# Patient Record
Sex: FEMALE | Race: WHITE | NOT HISPANIC OR LATINO | Employment: PART TIME | URBAN - METROPOLITAN AREA
[De-identification: names, ages, dates, MRNs, and addresses within clinical notes are randomized per-mention and may not be internally consistent; named-entity substitution may affect disease eponyms.]

---

## 2017-02-15 ENCOUNTER — OFFICE VISIT - RIVER FALLS (OUTPATIENT)
Dept: FAMILY MEDICINE | Facility: CLINIC | Age: 47
End: 2017-02-15

## 2017-02-15 ASSESSMENT — MIFFLIN-ST. JEOR: SCORE: 1185.16

## 2017-03-13 ENCOUNTER — OFFICE VISIT - RIVER FALLS (OUTPATIENT)
Dept: FAMILY MEDICINE | Facility: CLINIC | Age: 47
End: 2017-03-13

## 2017-03-13 ASSESSMENT — MIFFLIN-ST. JEOR: SCORE: 1168.14

## 2017-06-22 ENCOUNTER — OFFICE VISIT - RIVER FALLS (OUTPATIENT)
Dept: FAMILY MEDICINE | Facility: CLINIC | Age: 47
End: 2017-06-22

## 2017-06-22 ASSESSMENT — MIFFLIN-ST. JEOR: SCORE: 1189.01

## 2017-06-23 LAB
CREAT SERPL-MCNC: 0.76 MG/DL (ref 0.5–1.1)
GLUCOSE BLD-MCNC: 87 MG/DL (ref 65–99)

## 2017-08-24 ENCOUNTER — OFFICE VISIT - RIVER FALLS (OUTPATIENT)
Dept: FAMILY MEDICINE | Facility: CLINIC | Age: 47
End: 2017-08-24

## 2017-08-24 ASSESSMENT — MIFFLIN-ST. JEOR: SCORE: 1211.69

## 2017-09-14 ENCOUNTER — AMBULATORY - RIVER FALLS (OUTPATIENT)
Dept: FAMILY MEDICINE | Facility: CLINIC | Age: 47
End: 2017-09-14

## 2018-04-04 ENCOUNTER — OFFICE VISIT - RIVER FALLS (OUTPATIENT)
Dept: FAMILY MEDICINE | Facility: CLINIC | Age: 48
End: 2018-04-04

## 2018-04-04 ASSESSMENT — MIFFLIN-ST. JEOR: SCORE: 1206.25

## 2018-09-06 ENCOUNTER — OFFICE VISIT - RIVER FALLS (OUTPATIENT)
Dept: FAMILY MEDICINE | Facility: CLINIC | Age: 48
End: 2018-09-06

## 2018-09-06 ASSESSMENT — MIFFLIN-ST. JEOR: SCORE: 1241.63

## 2019-03-15 ENCOUNTER — OFFICE VISIT - RIVER FALLS (OUTPATIENT)
Dept: FAMILY MEDICINE | Facility: CLINIC | Age: 49
End: 2019-03-15

## 2019-03-15 LAB
ALBUMIN UR-MCNC: NEGATIVE G/DL
APPEARANCE UR: CLEAR
BILIRUB UR QL STRIP: NEGATIVE
COLOR UR AUTO: YELLOW
ERYTHROCYTE [DISTWIDTH] IN BLOOD BY AUTOMATED COUNT: 13.4 %
GLUCOSE UR STRIP-MCNC: NEGATIVE MG/DL
HCT VFR BLD AUTO: 41.3 %
HGB BLD-MCNC: 12.9 G/DL
HGB UR QL STRIP: NEGATIVE
KETONES UR STRIP-MCNC: NEGATIVE MG/DL
LEUKOCYTE ESTERASE UR QL STRIP: NEGATIVE
LYMPHOCYTES NFR BLD AUTO: 14.4 %
MCH RBC QN AUTO: 27.5 PG
MCHC RBC AUTO-ENTMCNC: 31.2 GM/DL
MCV RBC AUTO: 88.2 FL
MONOCYTES NFR BLD AUTO: 6.3 %
NEUTROPHILS NFR BLD AUTO: 79.3 %
NITRATE UR QL: NEGATIVE
PH UR STRIP: 8.5 [PH]
PLATELET # BLD AUTO: 261 X10^3/UL
RBC # BLD AUTO: 4.68 X10^6/UL
SP GR UR STRIP: 1.02
UROBILINOGEN UR STRIP-MCNC: NORMAL MG/DL
WBC # BLD AUTO: 9.1 X10^3/UL

## 2019-03-19 ENCOUNTER — OFFICE VISIT - RIVER FALLS (OUTPATIENT)
Dept: FAMILY MEDICINE | Facility: CLINIC | Age: 49
End: 2019-03-19

## 2019-03-19 ASSESSMENT — MIFFLIN-ST. JEOR: SCORE: 1243.44

## 2019-07-15 ENCOUNTER — OFFICE VISIT - RIVER FALLS (OUTPATIENT)
Dept: FAMILY MEDICINE | Facility: CLINIC | Age: 49
End: 2019-07-15

## 2019-07-15 ASSESSMENT — MIFFLIN-ST. JEOR: SCORE: 1244.35

## 2019-07-16 LAB — TSH SERPL DL<=0.005 MIU/L-ACNC: 0.16 MIU/L

## 2019-07-17 ENCOUNTER — COMMUNICATION - RIVER FALLS (OUTPATIENT)
Dept: FAMILY MEDICINE | Facility: CLINIC | Age: 49
End: 2019-07-17

## 2019-10-01 ENCOUNTER — OFFICE VISIT - RIVER FALLS (OUTPATIENT)
Dept: FAMILY MEDICINE | Facility: CLINIC | Age: 49
End: 2019-10-01

## 2019-10-01 ASSESSMENT — MIFFLIN-ST. JEOR: SCORE: 1252.51

## 2020-02-13 ENCOUNTER — OFFICE VISIT - RIVER FALLS (OUTPATIENT)
Dept: FAMILY MEDICINE | Facility: CLINIC | Age: 50
End: 2020-02-13

## 2020-02-13 ASSESSMENT — MIFFLIN-ST. JEOR: SCORE: 1247.98

## 2020-04-20 ENCOUNTER — OFFICE VISIT - RIVER FALLS (OUTPATIENT)
Dept: FAMILY MEDICINE | Facility: CLINIC | Age: 50
End: 2020-04-20

## 2020-05-29 ENCOUNTER — OFFICE VISIT - RIVER FALLS (OUTPATIENT)
Dept: FAMILY MEDICINE | Facility: CLINIC | Age: 50
End: 2020-05-29

## 2020-05-29 ASSESSMENT — MIFFLIN-ST. JEOR: SCORE: 1279.73

## 2020-06-01 ENCOUNTER — OFFICE VISIT - RIVER FALLS (OUTPATIENT)
Dept: FAMILY MEDICINE | Facility: CLINIC | Age: 50
End: 2020-06-01

## 2020-07-13 ENCOUNTER — OFFICE VISIT - RIVER FALLS (OUTPATIENT)
Dept: FAMILY MEDICINE | Facility: CLINIC | Age: 50
End: 2020-07-13

## 2020-07-13 ASSESSMENT — MIFFLIN-ST. JEOR: SCORE: 1225.3

## 2020-07-14 ENCOUNTER — COMMUNICATION - RIVER FALLS (OUTPATIENT)
Dept: FAMILY MEDICINE | Facility: CLINIC | Age: 50
End: 2020-07-14

## 2020-07-14 LAB
CHOLEST SERPL-MCNC: 239 MG/DL
CHOLEST/HDLC SERPL: 3 {RATIO}
GLUCOSE BLD-MCNC: 91 MG/DL (ref 65–99)
HDLC SERPL-MCNC: 81 MG/DL
LDLC SERPL CALC-MCNC: 140 MG/DL
NONHDLC SERPL-MCNC: 158 MG/DL
TRIGL SERPL-MCNC: 83 MG/DL
TSH SERPL DL<=0.005 MIU/L-ACNC: 3.93 MIU/L

## 2020-07-17 ENCOUNTER — COMMUNICATION - RIVER FALLS (OUTPATIENT)
Dept: FAMILY MEDICINE | Facility: CLINIC | Age: 50
End: 2020-07-17

## 2020-08-25 ENCOUNTER — OFFICE VISIT - RIVER FALLS (OUTPATIENT)
Dept: FAMILY MEDICINE | Facility: CLINIC | Age: 50
End: 2020-08-25

## 2020-08-25 ASSESSMENT — MIFFLIN-ST. JEOR: SCORE: 1252.51

## 2020-09-28 ENCOUNTER — COMMUNICATION - RIVER FALLS (OUTPATIENT)
Dept: FAMILY MEDICINE | Facility: CLINIC | Age: 50
End: 2020-09-28

## 2020-09-28 ENCOUNTER — OFFICE VISIT - RIVER FALLS (OUTPATIENT)
Dept: FAMILY MEDICINE | Facility: CLINIC | Age: 50
End: 2020-09-28

## 2020-09-28 LAB
ALBUMIN UR-MCNC: NEGATIVE G/DL
BASOPHILS # BLD MANUAL: 0 10*3/UL
BASOPHILS NFR BLD MANUAL: 0.3 %
BILIRUB UR QL STRIP: NEGATIVE
EOSINOPHIL # BLD MANUAL: 0.1 10*3/UL
EOSINOPHIL NFR BLD MANUAL: 2.2 %
ERYTHROCYTE [DISTWIDTH] IN BLOOD BY AUTOMATED COUNT: 14.1 % (ref 11.5–15.5)
GLUCOSE UR STRIP-MCNC: NEGATIVE MG/DL
HCT VFR BLD AUTO: 41.7 % (ref 33–51)
HGB BLD-MCNC: 13.9 G/DL (ref 12–16)
HGB UR QL STRIP: NEGATIVE
KETONES UR STRIP-MCNC: NEGATIVE MG/DL
LEUKOCYTE ESTERASE UR QL STRIP: NEGATIVE
LYMPHOCYTES # BLD MANUAL: 1.7 10*3/UL (ref 0.9–2.9)
LYMPHOCYTES NFR BLD MANUAL: 27.7 %
MCH RBC QN AUTO: 28.8 PG (ref 26–34)
MCHC RBC AUTO-ENTMCNC: 33.3 G/DL (ref 32–36)
MCV RBC AUTO: 86 FL (ref 80–100)
MONOCYTES # BLD MANUAL: 0.6 10*3/UL
MONOCYTES NFR BLD MANUAL: 9.6 %
NEUTROPHILS # BLD MANUAL: 3.6 10*3/UL (ref 1.7–7)
NEUTROPHILS NFR BLD MANUAL: 60.2 %
NITRATE UR QL: NEGATIVE
PH UR STRIP: 7 [PH] (ref 5–8)
PLATELET # BLD AUTO: 264 10*3/UL (ref 140–440)
PMV BLD: 9.3 FL (ref 6.5–11)
RBC # BLD AUTO: 4.83 10*6/UL (ref 4–5.2)
SP GR UR STRIP: 1.02 (ref 1–1.03)
WBC # BLD AUTO: 6 10*3/UL (ref 4.5–11)

## 2020-09-29 LAB
T4 FREE SERPL-MCNC: 1.4 NG/DL (ref 0.8–1.8)
TSH SERPL DL<=0.005 MIU/L-ACNC: 0.99 MIU/L

## 2020-09-30 LAB — BACTERIA SPEC CULT: NORMAL

## 2020-10-07 ENCOUNTER — OFFICE VISIT - RIVER FALLS (OUTPATIENT)
Dept: FAMILY MEDICINE | Facility: CLINIC | Age: 50
End: 2020-10-07

## 2020-10-07 ASSESSMENT — MIFFLIN-ST. JEOR: SCORE: 1257.05

## 2020-12-29 ENCOUNTER — OFFICE VISIT - RIVER FALLS (OUTPATIENT)
Dept: FAMILY MEDICINE | Facility: CLINIC | Age: 50
End: 2020-12-29

## 2020-12-29 ASSESSMENT — MIFFLIN-ST. JEOR: SCORE: 1234.37

## 2021-01-21 ENCOUNTER — OFFICE VISIT - RIVER FALLS (OUTPATIENT)
Dept: FAMILY MEDICINE | Facility: CLINIC | Age: 51
End: 2021-01-21

## 2021-01-21 LAB
A/G RATIO - HISTORICAL: 1.5 (ref 1–2)
ALBUMIN SERPL-MCNC: 4.6 G/DL (ref 3.5–5.2)
ALP SERPL-CCNC: 117 IU/L (ref 50–136)
ALT SERPL W P-5'-P-CCNC: 48 IU/L (ref 8–45)
AMYLASE SERPL-CCNC: 90 IU/L (ref 25–125)
ANION GAP SERPL CALCULATED.3IONS-SCNC: 7 MMOL/L (ref 5–18)
AST SERPL W P-5'-P-CCNC: 29 IU/L (ref 2–40)
BASOPHILS # BLD MANUAL: 0 10*3/UL
BASOPHILS NFR BLD MANUAL: 0.4 %
BILIRUB DIRECT SERPL-MCNC: 0.2 MG/DL (ref 0.1–0.5)
BILIRUB INDIRECT SERPL-MCNC: 0.3 MG/DL (ref 0.2–0.8)
BILIRUB SERPL-MCNC: 0.5 MG/DL (ref 0.2–1.2)
BUN SERPL-MCNC: 19 MG/DL (ref 8–25)
BUN/CREAT RATIO - HISTORICAL: 24 (ref 10–20)
CALCIUM SERPL-MCNC: 9.4 MG/DL (ref 8.5–10.5)
CHLORIDE BLD-SCNC: 105 MMOL/L (ref 98–110)
CO2 SERPL-SCNC: 28 MMOL/L (ref 21–31)
CREAT SERPL-MCNC: 0.8 MG/DL (ref 0.57–1.11)
EOSINOPHIL # BLD MANUAL: 0.1 10*3/UL
EOSINOPHIL NFR BLD MANUAL: 1.6 %
ERYTHROCYTE [DISTWIDTH] IN BLOOD BY AUTOMATED COUNT: 13.8 % (ref 11.5–15.5)
GFR ESTIMATE EXT - HISTORICAL: >60
GLOBULIN: 3.1 G/DL (ref 2–3.7)
GLUCOSE BLD-MCNC: 94 MG/DL (ref 65–100)
HCT VFR BLD AUTO: 43 % (ref 33–51)
HGB BLD-MCNC: 14.4 G/DL (ref 12–16)
LYMPHOCYTES # BLD MANUAL: 1.4 10*3/UL (ref 0.9–2.9)
LYMPHOCYTES NFR BLD MANUAL: 26.1 %
MCH RBC QN AUTO: 28.7 PG (ref 26–34)
MCHC RBC AUTO-ENTMCNC: 33.5 G/DL (ref 32–36)
MCV RBC AUTO: 86 FL (ref 80–100)
MONOCYTES # BLD MANUAL: 0.5 10*3/UL
MONOCYTES NFR BLD MANUAL: 8.4 %
NEUTROPHILS # BLD MANUAL: 3.5 10*3/UL (ref 1.7–7)
NEUTROPHILS NFR BLD MANUAL: 63.5 %
PLATELET # BLD AUTO: 236 10*3/UL (ref 140–440)
PMV BLD: 9.4 FL (ref 6.5–11)
POTASSIUM BLD-SCNC: 4.7 MMOL/L (ref 3.5–5)
PROT SERPL-MCNC: 7.7 G/DL (ref 6–8)
RBC # BLD AUTO: 5.01 10*6/UL (ref 4–5.2)
SODIUM SERPL-SCNC: 140 MMOL/L (ref 135–145)
WBC # BLD AUTO: 5.5 10*3/UL (ref 4.5–11)

## 2021-01-21 ASSESSMENT — MIFFLIN-ST. JEOR: SCORE: 1225.3

## 2021-02-03 ENCOUNTER — OFFICE VISIT - RIVER FALLS (OUTPATIENT)
Dept: FAMILY MEDICINE | Facility: CLINIC | Age: 51
End: 2021-02-03

## 2021-02-03 ASSESSMENT — MIFFLIN-ST. JEOR: SCORE: 1216.23

## 2021-03-02 ENCOUNTER — OFFICE VISIT - RIVER FALLS (OUTPATIENT)
Dept: FAMILY MEDICINE | Facility: CLINIC | Age: 51
End: 2021-03-02

## 2021-03-02 ASSESSMENT — MIFFLIN-ST. JEOR: SCORE: 1229.38

## 2021-06-23 ENCOUNTER — OFFICE VISIT - RIVER FALLS (OUTPATIENT)
Dept: FAMILY MEDICINE | Facility: CLINIC | Age: 51
End: 2021-06-23

## 2021-06-23 ASSESSMENT — MIFFLIN-ST. JEOR: SCORE: 1211.69

## 2021-06-25 ENCOUNTER — COMMUNICATION - RIVER FALLS (OUTPATIENT)
Dept: FAMILY MEDICINE | Facility: CLINIC | Age: 51
End: 2021-06-25

## 2021-06-25 LAB
ALBUMIN UR-MCNC: NEGATIVE G/DL
APPEARANCE UR: CLEAR
BACTERIA SPEC CULT: NORMAL
BILIRUB UR QL STRIP: NEGATIVE
COLOR UR AUTO: YELLOW
GLUCOSE UR STRIP-MCNC: NEGATIVE MG/DL
HGB UR QL STRIP: NEGATIVE
KETONES UR STRIP-MCNC: NEGATIVE MG/DL
LEUKOCYTE ESTERASE UR QL STRIP: NEGATIVE
NITRATE UR QL: NEGATIVE
PH UR STRIP: 5.5 [PH] (ref 5–8)
SP GR UR STRIP: 1.02 (ref 1–1.03)

## 2021-07-13 ENCOUNTER — OFFICE VISIT - RIVER FALLS (OUTPATIENT)
Dept: FAMILY MEDICINE | Facility: CLINIC | Age: 51
End: 2021-07-13

## 2021-07-13 ASSESSMENT — MIFFLIN-ST. JEOR: SCORE: 1218.49

## 2021-08-12 ENCOUNTER — OFFICE VISIT - RIVER FALLS (OUTPATIENT)
Dept: FAMILY MEDICINE | Facility: CLINIC | Age: 51
End: 2021-08-12

## 2021-08-12 ASSESSMENT — MIFFLIN-ST. JEOR: SCORE: 1218.49

## 2021-09-09 ENCOUNTER — OFFICE VISIT - RIVER FALLS (OUTPATIENT)
Dept: FAMILY MEDICINE | Facility: CLINIC | Age: 51
End: 2021-09-09
Payer: COMMERCIAL

## 2021-09-29 ENCOUNTER — LAB REQUISITION (OUTPATIENT)
Dept: LAB | Facility: CLINIC | Age: 51
End: 2021-09-29
Payer: COMMERCIAL

## 2021-09-29 ENCOUNTER — AMBULATORY - RIVER FALLS (OUTPATIENT)
Dept: FAMILY MEDICINE | Facility: CLINIC | Age: 51
End: 2021-09-29
Payer: COMMERCIAL

## 2021-09-29 ENCOUNTER — OFFICE VISIT - RIVER FALLS (OUTPATIENT)
Dept: FAMILY MEDICINE | Facility: CLINIC | Age: 51
End: 2021-09-29
Payer: COMMERCIAL

## 2021-09-29 DIAGNOSIS — U07.1 COVID-19: ICD-10-CM

## 2021-09-29 PROCEDURE — U0003 INFECTIOUS AGENT DETECTION BY NUCLEIC ACID (DNA OR RNA); SEVERE ACUTE RESPIRATORY SYNDROME CORONAVIRUS 2 (SARS-COV-2) (CORONAVIRUS DISEASE [COVID-19]), AMPLIFIED PROBE TECHNIQUE, MAKING USE OF HIGH THROUGHPUT TECHNOLOGIES AS DESCRIBED BY CMS-2020-01-R: HCPCS | Mod: ORL | Performed by: PHYSICIAN ASSISTANT

## 2021-10-01 LAB — SARS-COV-2 RNA RESP QL NAA+PROBE: DETECTED

## 2021-10-05 LAB — SARS-COV-2 RNA RESP QL NAA+PROBE: POSITIVE

## 2021-10-11 ENCOUNTER — OFFICE VISIT - RIVER FALLS (OUTPATIENT)
Dept: FAMILY MEDICINE | Facility: CLINIC | Age: 51
End: 2021-10-11
Payer: COMMERCIAL

## 2021-10-11 ASSESSMENT — MIFFLIN-ST. JEOR: SCORE: 1213.96

## 2021-10-21 ENCOUNTER — COMMUNICATION - RIVER FALLS (OUTPATIENT)
Dept: FAMILY MEDICINE | Facility: CLINIC | Age: 51
End: 2021-10-21
Payer: COMMERCIAL

## 2021-10-27 ENCOUNTER — OFFICE VISIT - RIVER FALLS (OUTPATIENT)
Dept: FAMILY MEDICINE | Facility: CLINIC | Age: 51
End: 2021-10-27
Payer: COMMERCIAL

## 2021-10-27 ASSESSMENT — MIFFLIN-ST. JEOR: SCORE: 1219.4

## 2021-10-29 ENCOUNTER — COMMUNICATION - RIVER FALLS (OUTPATIENT)
Dept: FAMILY MEDICINE | Facility: CLINIC | Age: 51
End: 2021-10-29
Payer: COMMERCIAL

## 2021-11-01 ENCOUNTER — OFFICE VISIT - RIVER FALLS (OUTPATIENT)
Dept: FAMILY MEDICINE | Facility: CLINIC | Age: 51
End: 2021-11-01
Payer: COMMERCIAL

## 2021-11-01 ASSESSMENT — MIFFLIN-ST. JEOR: SCORE: 1215.32

## 2021-11-02 LAB — D DIMER PPP FEU-MCNC: 0.27 MCG/ML FEU

## 2021-11-04 ENCOUNTER — COMMUNICATION - RIVER FALLS (OUTPATIENT)
Dept: FAMILY MEDICINE | Facility: CLINIC | Age: 51
End: 2021-11-04
Payer: COMMERCIAL

## 2021-11-16 ENCOUNTER — OFFICE VISIT - RIVER FALLS (OUTPATIENT)
Dept: FAMILY MEDICINE | Facility: CLINIC | Age: 51
End: 2021-11-16
Payer: COMMERCIAL

## 2021-11-19 ENCOUNTER — OFFICE VISIT - RIVER FALLS (OUTPATIENT)
Dept: FAMILY MEDICINE | Facility: CLINIC | Age: 51
End: 2021-11-19
Payer: COMMERCIAL

## 2021-11-19 ASSESSMENT — MIFFLIN-ST. JEOR: SCORE: 1211.69

## 2021-11-23 ENCOUNTER — OFFICE VISIT - RIVER FALLS (OUTPATIENT)
Dept: FAMILY MEDICINE | Facility: CLINIC | Age: 51
End: 2021-11-23
Payer: COMMERCIAL

## 2021-11-23 ASSESSMENT — MIFFLIN-ST. JEOR: SCORE: 1229.83

## 2021-11-24 ENCOUNTER — OFFICE VISIT - RIVER FALLS (OUTPATIENT)
Dept: FAMILY MEDICINE | Facility: CLINIC | Age: 51
End: 2021-11-24
Payer: COMMERCIAL

## 2021-11-24 ASSESSMENT — MIFFLIN-ST. JEOR: SCORE: 1225.3

## 2021-12-01 ENCOUNTER — COMMUNICATION - RIVER FALLS (OUTPATIENT)
Dept: FAMILY MEDICINE | Facility: CLINIC | Age: 51
End: 2021-12-01
Payer: COMMERCIAL

## 2021-12-06 ENCOUNTER — OFFICE VISIT - RIVER FALLS (OUTPATIENT)
Dept: FAMILY MEDICINE | Facility: CLINIC | Age: 51
End: 2021-12-06
Payer: COMMERCIAL

## 2021-12-06 ENCOUNTER — COMMUNICATION - RIVER FALLS (OUTPATIENT)
Dept: FAMILY MEDICINE | Facility: CLINIC | Age: 51
End: 2021-12-06
Payer: COMMERCIAL

## 2021-12-06 ASSESSMENT — MIFFLIN-ST. JEOR: SCORE: 1225.3

## 2021-12-17 ENCOUNTER — OFFICE VISIT - RIVER FALLS (OUTPATIENT)
Dept: FAMILY MEDICINE | Facility: CLINIC | Age: 51
End: 2021-12-17
Payer: COMMERCIAL

## 2021-12-17 ASSESSMENT — MIFFLIN-ST. JEOR: SCORE: 1234.37

## 2022-01-18 ENCOUNTER — OFFICE VISIT - RIVER FALLS (OUTPATIENT)
Dept: FAMILY MEDICINE | Facility: CLINIC | Age: 52
End: 2022-01-18
Payer: COMMERCIAL

## 2022-02-11 VITALS
BODY MASS INDEX: 23.28 KG/M2 | DIASTOLIC BLOOD PRESSURE: 70 MMHG | HEIGHT: 63 IN | TEMPERATURE: 99.1 F | HEART RATE: 84 BPM | SYSTOLIC BLOOD PRESSURE: 98 MMHG | WEIGHT: 131.4 LBS

## 2022-02-11 VITALS
SYSTOLIC BLOOD PRESSURE: 110 MMHG | SYSTOLIC BLOOD PRESSURE: 104 MMHG | OXYGEN SATURATION: 96 % | TEMPERATURE: 99.5 F | WEIGHT: 144.6 LBS | BODY MASS INDEX: 25.87 KG/M2 | DIASTOLIC BLOOD PRESSURE: 70 MMHG | HEIGHT: 63 IN | HEART RATE: 70 BPM | BODY MASS INDEX: 24.82 KG/M2 | HEART RATE: 110 BPM | WEIGHT: 146 LBS | DIASTOLIC BLOOD PRESSURE: 60 MMHG

## 2022-02-11 VITALS — HEIGHT: 63 IN | BODY MASS INDEX: 26.28 KG/M2

## 2022-02-12 VITALS
WEIGHT: 149 LBS | WEIGHT: 148 LBS | HEART RATE: 72 BPM | DIASTOLIC BLOOD PRESSURE: 74 MMHG | SYSTOLIC BLOOD PRESSURE: 120 MMHG | SYSTOLIC BLOOD PRESSURE: 114 MMHG | BODY MASS INDEX: 26.64 KG/M2 | OXYGEN SATURATION: 98 % | RESPIRATION RATE: 16 BRPM | HEART RATE: 88 BPM | HEIGHT: 63 IN | WEIGHT: 148 LBS | BODY MASS INDEX: 26.22 KG/M2 | HEIGHT: 63 IN | SYSTOLIC BLOOD PRESSURE: 108 MMHG | TEMPERATURE: 98.2 F | TEMPERATURE: 97.9 F | DIASTOLIC BLOOD PRESSURE: 68 MMHG | HEART RATE: 92 BPM | BODY MASS INDEX: 26.4 KG/M2 | TEMPERATURE: 97.3 F | DIASTOLIC BLOOD PRESSURE: 72 MMHG

## 2022-02-12 VITALS
TEMPERATURE: 98.2 F | SYSTOLIC BLOOD PRESSURE: 112 MMHG | WEIGHT: 139 LBS | SYSTOLIC BLOOD PRESSURE: 114 MMHG | DIASTOLIC BLOOD PRESSURE: 68 MMHG | BODY MASS INDEX: 23.74 KG/M2 | WEIGHT: 134 LBS | HEIGHT: 63 IN | HEART RATE: 96 BPM | DIASTOLIC BLOOD PRESSURE: 66 MMHG | TEMPERATURE: 98.2 F | HEART RATE: 84 BPM | BODY MASS INDEX: 24.63 KG/M2 | OXYGEN SATURATION: 99 % | HEIGHT: 63 IN

## 2022-02-12 VITALS
HEART RATE: 110 BPM | OXYGEN SATURATION: 98 % | SYSTOLIC BLOOD PRESSURE: 124 MMHG | WEIGHT: 139 LBS | BODY MASS INDEX: 25.52 KG/M2 | TEMPERATURE: 97.9 F | TEMPERATURE: 97 F | HEIGHT: 63 IN | OXYGEN SATURATION: 99 % | HEART RATE: 100 BPM | TEMPERATURE: 97.6 F | DIASTOLIC BLOOD PRESSURE: 64 MMHG | BODY MASS INDEX: 25.2 KG/M2 | TEMPERATURE: 97.5 F | SYSTOLIC BLOOD PRESSURE: 114 MMHG | BODY MASS INDEX: 24.63 KG/M2 | DIASTOLIC BLOOD PRESSURE: 72 MMHG | WEIGHT: 144 LBS | WEIGHT: 142 LBS | TEMPERATURE: 97.8 F | HEIGHT: 63 IN | TEMPERATURE: 98.5 F | DIASTOLIC BLOOD PRESSURE: 76 MMHG | HEART RATE: 92 BPM | OXYGEN SATURATION: 99 % | HEART RATE: 76 BPM | HEIGHT: 63 IN | HEART RATE: 86 BPM | OXYGEN SATURATION: 99 % | DIASTOLIC BLOOD PRESSURE: 76 MMHG | WEIGHT: 143 LBS | BODY MASS INDEX: 25.16 KG/M2 | HEIGHT: 63 IN | DIASTOLIC BLOOD PRESSURE: 86 MMHG | SYSTOLIC BLOOD PRESSURE: 124 MMHG | OXYGEN SATURATION: 94 % | SYSTOLIC BLOOD PRESSURE: 114 MMHG | DIASTOLIC BLOOD PRESSURE: 80 MMHG | HEIGHT: 63 IN | HEART RATE: 88 BPM | BODY MASS INDEX: 25.34 KG/M2 | BODY MASS INDEX: 25.16 KG/M2 | SYSTOLIC BLOOD PRESSURE: 120 MMHG | WEIGHT: 142 LBS | SYSTOLIC BLOOD PRESSURE: 120 MMHG | HEIGHT: 63 IN

## 2022-02-12 VITALS
HEART RATE: 99 BPM | WEIGHT: 142.9 LBS | OXYGEN SATURATION: 89 % | BODY MASS INDEX: 25.32 KG/M2 | HEIGHT: 63 IN | SYSTOLIC BLOOD PRESSURE: 108 MMHG | TEMPERATURE: 96.8 F | DIASTOLIC BLOOD PRESSURE: 68 MMHG

## 2022-02-12 VITALS
BODY MASS INDEX: 25.16 KG/M2 | HEART RATE: 80 BPM | DIASTOLIC BLOOD PRESSURE: 62 MMHG | HEART RATE: 80 BPM | TEMPERATURE: 97.9 F | SYSTOLIC BLOOD PRESSURE: 98 MMHG | HEIGHT: 63 IN | WEIGHT: 154 LBS | HEIGHT: 63 IN | HEIGHT: 63 IN | HEART RATE: 66 BPM | WEIGHT: 142 LBS | DIASTOLIC BLOOD PRESSURE: 54 MMHG | SYSTOLIC BLOOD PRESSURE: 100 MMHG | DIASTOLIC BLOOD PRESSURE: 70 MMHG | BODY MASS INDEX: 27.29 KG/M2 | SYSTOLIC BLOOD PRESSURE: 118 MMHG | BODY MASS INDEX: 27.72 KG/M2

## 2022-02-12 VITALS
HEIGHT: 63 IN | TEMPERATURE: 98 F | HEART RATE: 98 BPM | WEIGHT: 148 LBS | DIASTOLIC BLOOD PRESSURE: 60 MMHG | BODY MASS INDEX: 26.22 KG/M2 | SYSTOLIC BLOOD PRESSURE: 104 MMHG | OXYGEN SATURATION: 97 %

## 2022-02-12 VITALS
WEIGHT: 145.6 LBS | SYSTOLIC BLOOD PRESSURE: 120 MMHG | HEIGHT: 63 IN | DIASTOLIC BLOOD PRESSURE: 80 MMHG | BODY MASS INDEX: 25.8 KG/M2 | TEMPERATURE: 98.5 F | HEART RATE: 88 BPM

## 2022-02-12 VITALS
OXYGEN SATURATION: 99 % | DIASTOLIC BLOOD PRESSURE: 80 MMHG | OXYGEN SATURATION: 98 % | WEIGHT: 140.5 LBS | TEMPERATURE: 98.8 F | HEIGHT: 63 IN | DIASTOLIC BLOOD PRESSURE: 74 MMHG | WEIGHT: 140.5 LBS | HEIGHT: 63 IN | HEART RATE: 104 BPM | BODY MASS INDEX: 24.89 KG/M2 | BODY MASS INDEX: 24.89 KG/M2 | HEART RATE: 93 BPM | BODY MASS INDEX: 24.63 KG/M2 | TEMPERATURE: 98 F | OXYGEN SATURATION: 98 % | HEIGHT: 63 IN | SYSTOLIC BLOOD PRESSURE: 116 MMHG | WEIGHT: 139 LBS | HEART RATE: 102 BPM | SYSTOLIC BLOOD PRESSURE: 120 MMHG | SYSTOLIC BLOOD PRESSURE: 120 MMHG | TEMPERATURE: 98 F | DIASTOLIC BLOOD PRESSURE: 70 MMHG

## 2022-02-12 VITALS
SYSTOLIC BLOOD PRESSURE: 104 MMHG | DIASTOLIC BLOOD PRESSURE: 74 MMHG | TEMPERATURE: 97.1 F | BODY MASS INDEX: 24.93 KG/M2 | HEIGHT: 63 IN | DIASTOLIC BLOOD PRESSURE: 62 MMHG | WEIGHT: 139.8 LBS | DIASTOLIC BLOOD PRESSURE: 85 MMHG | RESPIRATION RATE: 16 BRPM | BODY MASS INDEX: 25.67 KG/M2 | OXYGEN SATURATION: 99 % | HEART RATE: 92 BPM | TEMPERATURE: 96.4 F | HEART RATE: 98 BPM | HEIGHT: 63 IN | HEART RATE: 94 BPM | BODY MASS INDEX: 25.67 KG/M2 | DIASTOLIC BLOOD PRESSURE: 70 MMHG | HEART RATE: 69 BPM | HEIGHT: 63 IN | WEIGHT: 140.7 LBS | TEMPERATURE: 98.1 F | BODY MASS INDEX: 24.72 KG/M2 | HEIGHT: 63 IN | SYSTOLIC BLOOD PRESSURE: 127 MMHG | BODY MASS INDEX: 24.77 KG/M2 | OXYGEN SATURATION: 99 % | SYSTOLIC BLOOD PRESSURE: 132 MMHG | WEIGHT: 139.5 LBS | SYSTOLIC BLOOD PRESSURE: 100 MMHG | WEIGHT: 142.6 LBS | OXYGEN SATURATION: 100 %

## 2022-02-12 VITALS
OXYGEN SATURATION: 97 % | HEIGHT: 63 IN | SYSTOLIC BLOOD PRESSURE: 118 MMHG | DIASTOLIC BLOOD PRESSURE: 62 MMHG | BODY MASS INDEX: 24.41 KG/M2 | HEART RATE: 98 BPM | TEMPERATURE: 98 F | WEIGHT: 137.8 LBS

## 2022-02-12 VITALS
HEART RATE: 80 BPM | HEIGHT: 63 IN | WEIGHT: 147 LBS | BODY MASS INDEX: 26.05 KG/M2 | DIASTOLIC BLOOD PRESSURE: 70 MMHG | TEMPERATURE: 98.5 F | SYSTOLIC BLOOD PRESSURE: 116 MMHG

## 2022-02-12 VITALS
TEMPERATURE: 97.8 F | SYSTOLIC BLOOD PRESSURE: 130 MMHG | WEIGHT: 140 LBS | SYSTOLIC BLOOD PRESSURE: 100 MMHG | BODY MASS INDEX: 25.16 KG/M2 | WEIGHT: 144 LBS | HEART RATE: 119 BPM | OXYGEN SATURATION: 98 % | OXYGEN SATURATION: 99 % | HEIGHT: 63 IN | TEMPERATURE: 96.4 F | HEIGHT: 63 IN | HEIGHT: 63 IN | SYSTOLIC BLOOD PRESSURE: 110 MMHG | DIASTOLIC BLOOD PRESSURE: 74 MMHG | HEART RATE: 83 BPM | OXYGEN SATURATION: 98 % | WEIGHT: 142 LBS | HEART RATE: 87 BPM | BODY MASS INDEX: 24.8 KG/M2 | DIASTOLIC BLOOD PRESSURE: 74 MMHG | BODY MASS INDEX: 25.52 KG/M2 | DIASTOLIC BLOOD PRESSURE: 74 MMHG | TEMPERATURE: 97.5 F

## 2022-02-12 VITALS
HEART RATE: 68 BPM | DIASTOLIC BLOOD PRESSURE: 60 MMHG | BODY MASS INDEX: 25.91 KG/M2 | WEIGHT: 146.2 LBS | SYSTOLIC BLOOD PRESSURE: 106 MMHG | HEIGHT: 63 IN

## 2022-02-12 VITALS
HEART RATE: 116 BPM | WEIGHT: 129.4 LBS | DIASTOLIC BLOOD PRESSURE: 56 MMHG | HEIGHT: 63 IN | SYSTOLIC BLOOD PRESSURE: 110 MMHG | BODY MASS INDEX: 22.93 KG/M2 | TEMPERATURE: 100.5 F

## 2022-02-16 NOTE — TELEPHONE ENCOUNTER
---------------------  From: Ivana Shields MA (Phone Messages Pool ([id number]_[place] G[place] Graff))   To: Justina Laws MD;     Sent: 2/12/2020 1:50:28 PM CST  Subject: Phone Note: Sick     PCP:   CHILANGO      Time of Call:  1:30pm       Person Calling:  Martina Keane  Phone number:  [phone]    Returned call at: _    Note:   Patient called requesting a work note states she had nausea and vomiting on Monday along with body aches, yesterday and today has been able to keep food down. I let her know she would need an appointment as she has not been seen recently. Patient states she is unable to come in as they only have one Vehicle and her boyfriend does not get home until almost 5pm. I let her know I could ask you but it is typically protocol to come in and be seen so there is documented prof of illness.     Pharmacy: n/a    Last office visit and reason:  10/1/19    Transferred to: Atiyaent has to be seen to document a 3 day illness. We could see if she would be willing to have an evening appointment in [place] if they have anything available.---------------------  From: Justina Laws MD   To: Phone Messages [place] ([id number]_VARSHA Graff);     Sent: 2/12/2020 3:02:01 PM CST  Subject: RE: Phone Note: SickCalled and let patient know.

## 2022-02-16 NOTE — PROGRESS NOTES
Patient:   MARLEN ARDON            MRN: [id number]            FIN: [id number]               Age:   51 years     Sex:  Female     :  1970   Associated Diagnoses:   Post-COVID chronic cough; Sinus tachycardia   Author:   Jutsina Laws MD      Visit Information      Date of Service: 2021 10:53 am  Performing Location: [facility]  Encounter#: [id number]      Chief Complaint   muscle spasms to chest      History of Present Illness   patient here to follow up chest pain and muscle spasms  had COVID 21  has had persistent unusual symptoms like tachycardia, fatigue  most bothersome symptom is a grabbing, fluttering sensation, has been in both lower chest, left first, now right  cough is ongoing and dry  was given antibiotics but stopped due to diarrhea  sometimes feels like chest is tight      Health Status   Allergies:    Allergic Reactions (All)  Severity Not Documented  Doxycycline (Ill)  Naproxen (No reactions were documented)  Sulfa drug (Hives)   Medications:  (Selected)   Prescriptions  Prescribed  Albuterol (Eqv-ProAir HFA) 90 mcg/inh inhalation aerosol: 2 puff(s), Inhale, q6 hrs, # 6.7 gm, 0 Refill(s), Type: Maintenance, Pharmacy: [facility], 2 puff(s) Inhale q6 hrs, 62.5, in, 10/11/21 16:26:00 CDT, Height Measured, 139.5, lb, 10/11/21 16:26:00 CDT, Weight Measured  Flovent  mcg/inh inhalation aerosol: ( 220 mcg ), Inhale, bid, # 1 EA, 0 Refill(s), Type: Maintenance, Pharmacy: [facility], 220 mcg Inhale bid, 62.5, in, 10/27/21 13:14:00 CDT, Height Measured, 140.7, lb, 10/27/21 13:14:00 CDT, Weight Measured  levothyroxine 88 mcg (0.088 mg) oral tablet: = 1 tab(s), Oral, daily, Instructions: INCREASE., # 90 tab(s), 2 Refill(s), Type: Maintenance, Pharmacy: [facility], TAKE 1 TABLET BY MOUTH ONCE DAILY ( INCREASE ), 62.5, in, 10/07/20 11:31:00 CDT, Height Measured, 149, lb, 10/07/20 11:31:00 CDT,...  Documented Medications  Documented  Calcium 600+D: 2  tab(s), po, daily, 0 Refill(s), Type: Maintenance  Colace 50 mg oral capsule: = 1 cap(s) ( 50 mg ), Oral, bid, 0 Refill(s), Type: Maintenance,    Medications          *denotes recorded medication          Albuterol (Eqv-ProAir HFA) 90 mcg/inh inhalation aerosol: 2 puff(s), Inhale, q6 hrs, 6.7 gm, 0 Refill(s).          *Calcium 600+D: 2 tab(s), po, daily.          *Colace 50 mg oral capsule: 50 mg, 1 cap(s), Oral, bid, 0 Refill(s).          Flovent  mcg/inh inhalation aerosol: 220 mcg, Inhale, bid, 1 EA, 0 Refill(s).          levothyroxine 88 mcg (0.088 mg) oral tablet: 1 tab(s), Oral, daily, INCREASE., 90 tab(s), 2 Refill(s).       Problem list:    All Problems  Unspecified abdominal pain / SNOMED CT 93024301 / Confirmed  Anxiety disorder / SNOMED CT 428367727 / Confirmed  Acquired hypothyroidism / SNOMED CT 915845715 / Confirmed  Ovarian cyst / SNOMED CT 548102200 / Confirmed  Right ovary  Chronic insomnia / SNOMED CT 664627848 / Confirmed  Osteopenia / SNOMED CT 725408860 / Confirmed      Histories   Past Medical History:    Active  Ovarian cyst (SNOMED CT 215530555)  Comments:  10/17/2013 CDT 11:40 AM CDT - Yulisa Ray LPN  Right ovary  Osteopenia (SNOMED CT 889119225)  Chronic insomnia (SNOMED CT 729294765)  Anxiety disorder (SNOMED CT 365808427)  Acquired hypothyroidism (SNOMED CT 711082533)  Unspecified abdominal pain (SNOMED CT 60905802)   Family History:    MI  Father  Diabetes mellitus  Mother  Hypertension  Father     Procedure history:    Colonoscopy (636589353) on 3/26/2021 at 50 Years.  Comments:  4/15/2021 7:59 AM CDT - Rama Pemberton  Indication: Left lower quadrant abdominal pain.   Sedation: Versed, Fentanyl.  Findings: Normal with the exception of severe diverticulosis in descending and sigmoid colon.  Hysterectomy and unilateral salpingo-oophorectomy sample ([id number]) on 6/2/2005 at 34 Years.  Comments:  10/17/2013 11:39 AM CDT - Cory CHA, Yulisa GARCIA ov elisabet removed.   Social History:         Electronic Cigarette/Vaping Assessment: Denies Electronic Cigarette Use            Electronic Cigarette Use: Never.      Alcohol Assessment            1 x per month, 1 drinks/episode average.  2 drinks/episode maximum.      Tobacco Assessment: Past            Former smoker, quit more than 30 days ago      Home and Environment Assessment            Marital status:  x 2.      Nutrition and Health Assessment            Type of diet: Regular.      Exercise and Physical Activity Assessment: Regular exercise        Physical Examination   Vital Signs   12/6/2021 11:02 AM CST Temperature Oral 97.6 DegF    Apical Heart Rate 100 bpm    Pulse Site Radial artery    HR Method Manual    Systolic Blood Pressure 114 mmHg    Diastolic Blood Pressure 76 mmHg    Mean Arterial Pressure 89 mmHg    BP Site Right arm    BP Method Manual    Oxygen Saturation 99 %      Measurements from flowsheet : Measurements   12/6/2021 11:02 AM CST Height Measured - Standard 62.5 in    Weight Measured - Standard 142 lb    BSA 1.68 m2    Body Mass Index 25.56 kg/m2  HI      General:  Alert and oriented, No acute distress.    Eye:  Pupils are equal, round and reactive to light, Normal conjunctiva.    HENT:  Normocephalic, Tympanic membranes are clear, Oral mucosa is moist, No pharyngeal erythema, No sinus tenderness.    Neck:  Supple, Non-tender, No lymphadenopathy.    Respiratory:  Lungs are clear to auscultation.    Cardiovascular:  Normal rate, Regular rhythm.       Review / Management   Results review:  Lab results   11/1/2021 1:56 PM CDT D-Dimer 0.27 mcg/mL FEU   9/29/2021 2:20 PM CDT Coronavirus SARS-CoV-2 (COVID-19) TR Positive   .    ER records from [facility] reviewed      Impression and Plan   Diagnosis     Post-COVID chronic cough (USZ20-TU R05.3).     Sinus tachycardia (WHD09-[place] R00.0).     Course:  patient with persistent symptoms not resolving, unable to tolerate prior trial of antibiotics, will do zithromax and prednisone.  If not resolving in 2 weeks, call and will schedule for CT chest.       5 minutes spent reviewing prior records and ER note and results  20 minutes with patient  8 minutes documenting

## 2022-02-16 NOTE — NURSING NOTE
Comprehensive Intake Entered On:  2021 10:50 AM CDT    Performed On:  2021 10:46 AM CDT by Shaina Harrington CMA               Summary   Chief Complaint :   Sores and pain on both elbows x5 days.   Menstrual Status :   Hysterectomy   Weight Measured :   140.5 lb(Converted to: 140 lb 8 oz, 63.730 kg)    Height Measured :   62.5 in(Converted to: 5 ft 2 in, 158.75 cm)    Body Mass Index :   25.29 kg/m2 (HI)    Body Surface Area :   1.67 m2   Systolic Blood Pressure :   116 mmHg   Diastolic Blood Pressure :   74 mmHg   Mean Arterial Pressure :   88 mmHg   Peripheral Pulse Rate :   102 bpm (HI)    BP Site :   Right arm   Pulse Site :   Radial artery   BP Method :   Electronic   HR Method :   Electronic   Temperature Tympanic :   98.8 DegF(Converted to: 37.1 DegC)    Oxygen Saturation :   98 %   Shaina Harrington CMA - 2021 10:46 AM CDT   Health Status   Allergies Verified? :   Yes   Medication History Verified? :   Yes   Immunizations Current :   Other: Declined TDAP.   Medical History Verified? :   Yes   Pre-Visit Planning Status :   Completed   Shaina Harrington CMA - 2021 10:46 AM CDT   Demographics   Last Name :   [facility]   Address :   [address] [place] [address]   First Name :   Martina Keane   Responsible Party Address 2 :   [address]   Responsible Party Date of Birth () :   1970 CDT   City :   [place]   State :   [place]   Zip Code :   [id number]   Shaina Harrington CMA - 2021 10:46 AM CDT   Providers Grid   Provider Name :    Dr. Urbina              Provider Specialty :    PsyD[counselor].              Comments :    Shaina Evans CMA - 2021 10:46 AM CDT         Consents   Consent for Immunization Exchange :   Consent Granted   Consent for Immunizations to Providers :   Consent Granted   Shaina Harrington CMA - 2021 10:46 AM CDT   Problems   (As Of: 2021 10:50:09 AM CDT)   Problems(Active)    Acquired hypothyroidism (SNOMED CT  :584818271 )  Name of Problem:   Acquired  hypothyroidism ; Recorder:   Justina Laws MD; Confirmation:   Confirmed ; Classification:   Medical ; Code:   336050706 ; Contributor System:   PowerChart ; Last Updated:   4/15/2021 7:56 AM CDT ; Life Cycle Status:   Active ; Responsible Provider:   Justina Laws MD; Vocabulary:   SNOMED CT        Anxiety Disorder (SNOMED CT  :[id number] )  Name of Problem:   Anxiety Disorder ; Recorder:   Justina Laws MD; Confirmation:   Confirmed ; Classification:   Medical ; Code:   [id number] ; Contributor System:   PowerChart ; Last Updated:   4/15/2021 7:55 AM CDT ; Life Cycle Status:   Active ; Responsible Provider:   Justina Laws MD; Vocabulary:   SNOMED CT        Chronic insomnia (SNOMED CT  :140426659 )  Name of Problem:   Chronic insomnia ; Recorder:   Justina Laws MD; Confirmation:   Confirmed ; Classification:   Medical ; Code:   955776878 ; Contributor System:   PowerChart ; Last Updated:   4/15/2021 7:55 AM CDT ; Life Cycle Status:   Active ; Responsible Provider:   Justina Laws MD; Vocabulary:   SNOMED CT        Osteopenia (SNOMED CT  :009988176 )  Name of Problem:   Osteopenia ; Recorder:   Justina Laws MD; Confirmation:   Confirmed ; Classification:   Medical ; Code:   145797478 ; Contributor System:   PowerChart ; Last Updated:   4/15/2021 7:55 AM CDT ; Life Cycle Status:   Active ; Responsible Provider:   Justina Laws MD; Vocabulary:   SNOMED CT        Ovarian cyst (SNOMED CT  :[id number] )  Name of Problem:   Ovarian cyst ; Recorder:   Yulisa Ray LPN; Confirmation:   Confirmed ; Classification:   Medical ; Code:   [id number] ; Contributor System:   PowerChart ; Last Updated:   2/28/2014 10:25 PM CST ; Life Cycle Date:   10/17/2013 ; Life Cycle Status:   Active ; Vocabulary:   SNOMED CT   ; Comments:        10/17/2013 11:40 AM - Yulisa Ray LPN  Right ovary       Unspecified abdominal pain (SNOMED CT  :54987474 )  Name of Problem:   Unspecified abdominal pain ; Recorder:   Rama Pemberton; Confirmation:   Confirmed ; Classification:   Medical ; Code:   04885652 ; Contributor System:   PowerChart ; Last Updated:   4/15/2021 7:57 AM CDT ; Life Cycle Date:   4/15/2021 ; Life Cycle Status:   Active ; Vocabulary:   SNOMED CT          Meds / Allergies   (As Of: 7/13/2021 10:50:09 AM CDT)   Allergies (Active)   doxycycline  Estimated Onset Date:   Unspecified ; Reactions:   Ill ; Created By:   Nena Edouard CMA; Reaction Status:   Active ; Category:   Drug ; Substance:   doxycycline ; Type:   Allergy ; Updated By:   Nena Edouard CMA; Reviewed Date:   7/13/2021 10:48 AM CDT      naproxen  Estimated Onset Date:   Unspecified ; Created By:   Yulisa Ray LPN; Reaction Status:   Active ; Category:   Drug ; Substance:   naproxen ; Type:   Allergy ; Updated By:   Yulisa Ray LPN; Reviewed Date:   7/13/2021 10:48 AM CDT      sulfa drug  Estimated Onset Date:   Unspecified ; Reactions:   Hives ; Created By:   Yulisa Ray LPN; Reaction Status:   Active ; Category:   Drug ; Substance:   sulfa drug ; Type:   Allergy ; Updated By:   Yulisa Ray LPN; Reviewed Date:   7/13/2021 10:48 AM CDT        Medication List   (As Of: 7/13/2021 10:50:09 AM CDT)   Prescription/Discharge Order    escitalopram  :   escitalopram ; Status:   Prescribed ; Ordered As Mnemonic:   escitalopram 10 mg oral tablet ; Simple Display Line:   1 tab(s), Oral, daily, 90 tab(s), 0 Refill(s) ; Ordering Provider:   Justina Laws MD; Catalog Code:   escitalopram ; Order Dt/Tm:   4/13/2021 12:48:23 PM CDT          levothyroxine  :   levothyroxine ; Status:   Prescribed ; Ordered As Mnemonic:   levothyroxine 88 mcg (0.088 mg) oral tablet ; Simple Display Line:   1 tab(s), Oral, daily, INCREASE., 90 tab(s), 2 Refill(s) ; Ordering Provider:   Justina Laws MD; Catalog Code:   levothyroxine ; Order Dt/Tm:    12/23/2020 4:32:41 PM CST            Home Meds    docusate  :   docusate ; Status:   Documented ; Ordered As Mnemonic:   Colace 50 mg oral capsule ; Simple Display Line:   50 mg, 1 cap(s), Oral, bid, 0 Refill(s) ; Catalog Code:   docusate ; Order Dt/Tm:   6/23/2021 1:39:09 PM CDT          calcium-vitamin D  :   calcium-vitamin D ; Status:   Documented ; Ordered As Mnemonic:   Calcium 600+D ; Simple Display Line:   2 tab(s), po, daily ; Catalog Code:   calcium-vitamin D ; Order Dt/Tm:   8/4/2014 9:31:21 AM CDT            ID Risk Screen   Recent Travel History :   No recent travel   Family Member Travel History :   No recent travel   Other Exposure to Infectious Disease :   Unknown   COVID-19 Testing Status :   No positive COVID-19 test   Andert Shaina APPLE - 7/13/2021 10:46 AM CDT   OB/GYN History   Menstrual Status :   Hysterectomy   Andert Shaina APPLE - 7/13/2021 10:46 AM CDT   Pregnancy History   (As Of: 7/13/2021 10:50:09 AM CDT)   No pregnancy history documented

## 2022-02-16 NOTE — NURSING NOTE
Comprehensive Intake Entered On:  3/15/2019 8:48 AM CDT    Performed On:  3/15/2019 8:44 AM CDT by Geeta Lee CMA               Summary   Chief Complaint :   Abdominal pains x few days   Menstrual Status :   Hysterectomy   Weight Measured :   144.6 lb(Converted to: 144 lb 10 oz, 65.59 kg)    Systolic Blood Pressure :   104 mmHg   Diastolic Blood Pressure :   70 mmHg   Mean Arterial Pressure :   81 mmHg   Peripheral Pulse Rate :   110 bpm (HI)    Temperature Tympanic :   99.5 DegF(Converted to: 37.5 DegC)    Oxygen Saturation :   96 %   Pain Present :   Yes (Provider Notified)   Intensity :   5    Primary Pain Location :   Abdomen lower   Geeta Lee CMA - 3/15/2019 8:44 AM CDT   Health Status   Allergies Verified? :   Yes   Medication History Verified? :   Yes   Immunizations Current :   Other: Declined TDAP.   Medical History Verified? :   Yes   Tobacco Use? :   Former smoker   Geeta Lee CMA - 3/15/2019 8:44 AM CDT   Consents   Consent for Immunization Exchange :   Consent Granted   Consent for Immunizations to Providers :   Consent Granted   Geeta Lee CMA - 3/15/2019 8:44 AM CDT   Meds / Allergies   (As Of: 3/15/2019 8:48:53 AM CDT)   Allergies (Active)   doxycycline  Estimated Onset Date:   Unspecified ; Reactions:   Ill ; Created By:   Nena Edouard CMA; Reaction Status:   Active ; Category:   Drug ; Substance:   doxycycline ; Type:   Allergy ; Updated By:   Nena Edouard CMA; Reviewed Date:   3/15/2019 8:48 AM CDT      naproxen  Estimated Onset Date:   Unspecified ; Created By:   Yulisa Ray LPN; Reaction Status:   Active ; Category:   Drug ; Substance:   naproxen ; Type:   Allergy ; Updated By:   Yulisa Ray LPN; Reviewed Date:   3/15/2019 8:48 AM CDT      sulfa drug  Estimated Onset Date:   Unspecified ; Reactions:   Hives ; Created By:   Yulisa Ray LPN; Reaction Status:   Active ; Category:   Drug ; Substance:   sulfa drug ; Type:   Allergy ; Updated By:   Yulisa Ray LPN;  Reviewed Date:   3/15/2019 8:48 AM CDT        Medication List   (As Of: 3/15/2019 8:48:53 AM CDT)   Prescription/Discharge Order    levothyroxine  :   levothyroxine ; Status:   Prescribed ; Ordered As Mnemonic:   levothyroxine 100 mcg (0.1 mg) oral tablet ; Simple Display Line:   100 mcg, 1 tab(s), PO, Daily, 90 tab(s), 3 Refill(s) ; Ordering Provider:   Justina Laws MD; Catalog Code:   levothyroxine ; Order Dt/Tm:   9/7/2018 7:57:28 AM            Home Meds    calcium-vitamin D  :   calcium-vitamin D ; Status:   Documented ; Ordered As Mnemonic:   Calcium 600+D ; Simple Display Line:   2 tab(s), po, daily ; Catalog Code:   calcium-vitamin D ; Order Dt/Tm:   8/4/2014 9:31:21 AM

## 2022-02-16 NOTE — LETTER
(Inserted Image. Unable to display)   July 13, 2021    MARLEN ARDON  [address] [place] [id number]            Dear MARLEN BETH,      Thank you for selecting [facility] for your healthcare needs.    Our records indicate you are due for the following services:    Follow-up office visit / Medication Check.    Non-Fasting Labs.    If you had your labs done at another facility or with Direct Access Lab Testing at [facility], please bring in a copy of the results to your next visit, mail a copy, or drop off a copy of your results to your Healthcare Provider.    (FYI   Regarding office visits: In some instances, a video visit or telephone visit may be offered as an option.)    You are due for lab work and an office visit; please schedule the lab appointment 1 week before the office visit.  This will assure all results are available to discuss with your Healthcare Provider during your visit.    **It is very helpful if you bring your medication bottles to your appointment.  This assures we have all of your current medications, including strength and dosing information, documented accurately in your medical record.    To schedule an appointment or if you have further questions, please contact your clinic at [phone].      Powered by [employer]    Sincerely,    Justina Laws MD

## 2022-02-16 NOTE — TELEPHONE ENCOUNTER
Entered by Percy Beckett CMA on December 23, 2020 4:32:45 PM CST  ---------------------  From: Percy Beckett CMA   To: [facility]    Sent: 12/23/2020 4:32:45 PM CST  Subject: Medication Management     ** Submitted: **  Complete:levothyroxine (levothyroxine 88 mcg (0.088 mg) oral tablet)   Signed by Percy Beckett CMA  12/23/2020 10:32:00 PM [facility]    ** Approved with modifications: **  levothyroxine (LEVOTHYROXINE 88MCG TABLET 88 Tablet)  TAKE 1 TABLET BY MOUTH ONCE DAILY ( INCREASE )  Qty:  90 tab(s)        Days Supply:  90        Refills:  2          Substitutions Allowed     Route To Pharmacy - [facility]   Signed by Percy Beckett CMA            ------------------------------------------  From: [facility]  To: Justina Laws MD  Sent: December 23, 2020 4:20:07 PM CST  Subject: Medication Management  Due: December 24, 2020 1:57:35 PM CST     ** On Hold Pending Signature **     Drug: levothyroxine (levothyroxine 88 mcg (0.088 mg) oral tablet), 1 tab(s) Oral daily  Quantity: 90 tab(s)  Days Supply: 0  Refills: 0  Substitutions Allowed  Notes from Pharmacy:     Dispensed Drug: levothyroxine (levothyroxine 88 mcg (0.088 mg) oral tablet), TAKE 1 TABLET BY MOUTH ONCE DAILY ( INCREASE )  Quantity: 90 tab(s)  Days Supply: 90  Refills: 3  Substitutions Allowed  Notes from Pharmacy:  ------------------------------------------Med Refill      Date of last office visit and reason:  10/7/20 neck fullness,depression      Date of last Med Check / Px:     Date of last labs pertaining to med:  TSH 9/28/20    Note:  Rx filled per protocol.  Percy Beckett CMA    RTC order in chart:  yes    For Protocol refill, has patient been contacted:  _

## 2022-02-16 NOTE — PROGRESS NOTES
Patient:   MARLEN ARDON            MRN: [id number]            FIN: [id number]               Age:   51 years     Sex:  Female     :  1970   Associated Diagnoses:   None   Author:   Jurgen Potts PA-C       -   Today's date:  2021 9:55:00 AM .        -   To whom it may concern:        This patient is currently under my care and Was seen in my office on  2021 9:55:00 AM.  .  He/ she may return to work, on  2021, with the following restrictions: work limited to  5  hours per day, This is for a period of two weeks. Will reassess in two weeks. So end date is Dec 6, 2021.

## 2022-02-16 NOTE — PROGRESS NOTES
Patient:   MARLEN ARDON            MRN: [id number]            FIN: [id number]               Age:   49 years     Sex:  Female     :  1970   Associated Diagnoses:   Diverticulitis; Vaginitis; Acquired hypothyroidism   Author:   Justina Laws MD      Visit Information   Visit type:  Telephone Encounter.    Source of history:  Patient.    Location of patient:  home  Call Start Time:   401pm  Call End Time:    416pm      Chief Complaint   2020 3:54 PM CDT    Verbal consent given for telephone visit. Yeast infection like symptoms. Pt also c/o abd pain. Feels like the last time she had diverticulitis. Pain just started within the last hour. Pt would also like to talk about changing her levothyroxine dose.     _      History of Present Illness   Today's visit was conducted via telephone due to the COVID-19 pandemic. Patient's consent to telephone visit was obtained and documented.      Reason for visit:    patient with several concerns as noted in chief complain  1. notes vaginal irritation and itching, no discharge, has had prior yeast and BV infection, notes more consistent with prior bacterial overgrowth  2. noting abdominal pain similar to prior concern for diverticulitis, abdominal cramping, normal bowel movements, no blood in stool, discomfort is worse prior to having BM, tender near scar on abdomen, pain is mostly lower abdomen but some discomfort into upper abdomen  3. patient notes that her levothyroxine dose feels too high, has been noticing palpitations, ran out of medication previously and noted symptoms improved, then returned again when restarted medication, wondering about trialing lower dose of levothyroxine      Impression and Plan   Diagnosis     Diverticulitis (IXY78-JP K57.92).     Course:  unchanged, not improving.    Plan:  will treat with antibiotics as prescribed, bland diet, follow up if worsening pain or fever or if pain does not improve, follow up in clinic for recheck.     Diagnosis     Vaginitis (LKB16-TA N76.0).     Course:  history of BV and yeast, but notes symptoms are not consistent with prior yeast, discussed that metronidazole should help with BV. Let me know if not resolving.    Diagnosis     Acquired hypothyroidism (LXY72-IK E03.4).     Orders     will try decreased dose of levothyroxine, due for TSH this summer.        Health Status   Allergies:    Allergic Reactions (Selected)  Severity Not Documented  Doxycycline (Ill)  Naproxen (No reactions were documented)  Sulfa drug (Hives)   Medications:  (Selected)   Prescriptions  Prescribed  levothyroxine 88 mcg (0.088 mg) oral tablet: = 1 tab(s) ( 88 mcg ), PO, Daily, # 90 tab(s), 3 Refill(s), Type: Maintenance, Pharmacy: [facility], 1 tab(s) Oral daily  Documented Medications  Documented  Calcium 600+D: 2 tab(s), po, daily, 0 Refill(s), Type: Maintenance   Problem list:    All Problems  Ovarian cyst / SNOMED CT 76SA74G9-PR17-4O8U-[id number] / Confirmed  Right ovary  Osteopenia / SNOMED CT 339422288 / Confirmed  Chronic insomnia / SNOMED CT 296286391 / Confirmed  Anxiety Disorder / SNOMED CT 6MM80T05-499R-76L5-LL01-AKV53222K287 / Confirmed  Acquired hypothyroidism / SNOMED CT 870613344 / Confirmed      Histories   Social History:        Alcohol Assessment            1 x per month, 1 drinks/episode average.  2 drinks/episode maximum.      Tobacco Assessment: Past            Past                     Comments:                      12/30/2014 - Yulisa Ray LPN                     Quit 3 yrs ago.      Home and Environment Assessment            Marital status:  x 2.      Nutrition and Health Assessment            Type of diet: Regular.      Exercise and Physical Activity Assessment: Regular exercise

## 2022-02-16 NOTE — TELEPHONE ENCOUNTER
---------------------  From: Cathy Bruno (Phone Messages Pool ([facility]))   To: [facility] ([address] [place] [address]);     Sent: 10/29/2021 12:04:49 PM CDT  Subject: General Message     Phone Message    PCP: CHILANGO    Time of Call: 1159    Phone Number: [phone]    Returned call at: n/a    Note: Call from pt stating that she was seen by [facility] 10/27 for a cough and was prescribed some inhalers. She states that she feels as if someone is punching her in the ribs and it is still hard to breathe. Patient wondering if anything else will help her or if she needs to wait a little longer.    Please advise---------------------  From: Jurgen Potts PA-C ([facility] ([facility]))   To: Phone [employer] [facility] ([address] [place] [address]);     Sent: 10/29/2021 2:33:29 PM CDT  Subject: RE: General Message     I would just advise more time. Hard to hear. Let us know if not gradually improved or if acutely worse.    KAHPatient notified. She is wondering if she can get a note to excuse her from work Wed, Thurs, Friday as she could not breathe very well---------------------  From: Cathy Bruno (Phone Messages Pool ([facility]))   To: Jurgen Potts PA-C;     Sent: 10/29/2021 2:45:28 PM CDT  Subject: FW: General Message---------------------  From: Jurgen Potts PA-C   To: Phone [employer] ([address] [place] [address]);     Sent: 10/29/2021 2:47:56 PM CDT  Subject: RE: General Message     Note in the chart    KAHPatient notified. Left note at

## 2022-02-16 NOTE — PROGRESS NOTES
"   Patient:   MARLEN ARDON            MRN: [id number]            FIN: [id number]               Age:   51 years     Sex:  Female     :  1970   Associated Diagnoses:   Persistent dyspnea after COVID-19   Author:   Katlyn CASTRO, Jurgen      Report Summary   Diagnosis  Persistent dyspnea after COVID-19 (ORF84-LE R06.00).  PlanPatient InstructionsSummary   Visit Information      Date of Service: 2021 12:34 pm  Performing Location: [facility]  Encounter#: [id number]   Visit type:  Scheduled follow-up.    Accompanied by:  No one.    Source of history:  Self.    History limitation:  None.       Chief Complaint   2021 12:37 PM CST  release back to work forms.      History of Present Illness             The patient presents with dyspnea.  The dyspnea is described as Occassional breathlessness.  The severity of the dyspnea is mild.  The timing/course of symptom(s) associated to dyspnea is episodic and is improving.  The dyspnea has lasted for 2 months.  The context of the dyspnea: occurred in association with illness.  Feels much improved. Work going well. Wants to work full days..  Exacerbating factors consist of Dust from work.  Relieving factors consist of rest.  Associated symptoms consist of cough and wheezing.  Pt removed from workroom w/dust at skate [employer] for one week. Sx relief and ongoing. Better than previous visit. Occasional \"lung spasms at base\" on R side initially now involves both lungs. Deep breaths help alleviate spasms. No SOB or cough w/spasm. Pt notes occasional tachycardia w/o chest pain.        Review of Systems   Constitutional:  Negative except as documented in history of present illness.    Ear/Nose/Mouth/Throat:  Negative.    Respiratory:  Negative except as documented in history of present illness.    Cardiovascular:  Negative except as documented in history of present illness.       Health Status   Allergies:    Allergic Reactions (Selected)  Severity Not " Documented  Doxycycline (Ill)  Naproxen (No reactions were documented)  Sulfa drug (Hives)   Medications:  (Selected)   Prescriptions  Prescribed  Albuterol (Eqv-ProAir HFA) 90 mcg/inh inhalation aerosol: 2 puff(s), Inhale, q6 hrs, # 6.7 gm, 0 Refill(s), Type: Maintenance, Pharmacy: [facility], 2 puff(s) Inhale q6 hrs, 62.5, in, 10/11/21 16:26:00 CDT, Height Measured, 139.5, lb, 10/11/21 16:26:00 CDT, Weight Measured  Flovent  mcg/inh inhalation aerosol: ( 220 mcg ), Inhale, bid, # 1 EA, 0 Refill(s), Type: Maintenance, Pharmacy: [facility], 220 mcg Inhale bid, 62.5, in, 10/27/21 13:14:00 CDT, Height Measured, 140.7, lb, 10/27/21 13:14:00 CDT, Weight Measured  levothyroxine 88 mcg (0.088 mg) oral tablet: = 1 tab(s), Oral, daily, Instructions: INCREASE., # 90 tab(s), 2 Refill(s), Type: Maintenance, Pharmacy: [facility], TAKE 1 TABLET BY MOUTH ONCE DAILY ( INCREASE ), 62.5, in, 10/07/20 11:31:00 CDT, Height Measured, 149, lb, 10/07/20 11:31:00 CDT,...  predniSONE 10 mg oral tablet: as directed, PO, Daily, Instructions: 4 tablets for 4 days then 3 tablets for 4 days then 2 tablets for 4 days then 1 tablet for 4 days, # 40 tab(s), 0 Refill(s), Type: Maintenance, Pharmacy: [facility], as directed Oral daily,Instr:4 tablets for...   Problem list:    All Problems  Unspecified abdominal pain / SNOMED CT 16461141 / Confirmed  Anxiety disorder / SNOMED CT 588295070 / Confirmed  Acquired hypothyroidism / SNOMED CT 038299868 / Confirmed  Ovarian cyst / SNOMED CT 487690303 / Confirmed  Chronic insomnia / SNOMED CT 092615823 / Confirmed  Osteopenia / SNOMED CT 846583139 / Confirmed      Histories   Past Medical History:    Active  Unspecified abdominal pain (24501289)  Anxiety disorder (359007698)  Acquired hypothyroidism (239456964)  Ovarian cyst (454474072)  Comments:  10/17/2013 CDT 11:40 AM CDT - Yulisa Ray LPN  Right ovary  Chronic insomnia (643908183)  Osteopenia (623750778)   Family History:     Father  MI  Hypertension  Mother  Diabetes mellitus     Procedure history:    Colonoscopy (205781106) on 3/26/2021 at 50 Years.  Comments:  4/15/2021 7:59 AM CDT - Rama Pemberton  Indication: Left lower quadrant abdominal pain.   Sedation: Versed, Fentanyl.  Findings: Normal with the exception of severe diverticulosis in descending and sigmoid colon.  Hysterectomy and unilateral salpingo-oophorectomy sample (948329624) on 6/2/2005 at 34 Years.  Comments:  10/17/2013 11:39 AM CDT - Yulisa Ray LPN ov elisabet removed.   Social History:        Electronic Cigarette/Vaping Assessment: Denies Electronic Cigarette Use            Electronic Cigarette Use: Never.      Alcohol Assessment            1 x per month, 1 drinks/episode average.  2 drinks/episode maximum.      Tobacco Assessment: Past            Former smoker, quit more than 30 days ago      Home and Environment Assessment            Marital status:  x 2.      Nutrition and Health Assessment            Type of diet: Regular.      Exercise and Physical Activity Assessment: Regular exercise        Physical Examination   Vital Signs   12/17/2021 12:37 PM CST Temperature Tympanic 97.8 DegF  LOW    Peripheral Pulse Rate 86 bpm    Pulse Site Radial artery    HR Method Manual    Systolic Blood Pressure 124 mmHg    Diastolic Blood Pressure 64 mmHg    Mean Arterial Pressure 84 mmHg    BP Site Right arm    BP Method Manual      Measurements from flowsheet : Measurements   12/17/2021 12:37 PM CST Height Measured 62.5 in    Weight Measured 144 lb    BSA 1.7 m2    Body Mass Index 25.92 kg/m2  HI      General:  Alert and oriented, No acute distress.    Eye:  Pupils are equal, round and reactive to light, Normal conjunctiva.    HENT:  Normocephalic, Tympanic membranes are clear, Normal hearing, Oral mucosa is moist, No pharyngeal erythema.    Neck:  Supple, Non-tender, No lymphadenopathy.    Respiratory:  Lungs are clear to auscultation, Breath sounds are equal,  Lightheaded during deep breaths for auscultation.    Cardiovascular:  Normal rate, Regular rhythm, Good pulses equal in all extremities.    Neurologic:  Alert, Oriented.    Psychiatric:  Cooperative, Appropriate mood & affect.       Impression and Plan   Diagnosis     Persistent dyspnea after COVID-19 (XWY18-AF R06.00).     Plan:  Cleared to return to work full time. Avoid baldo environment until further notice.    Patient Instructions:       Counseled: Patient, Regarding diagnosis, Regarding treatment, Verbalized understanding.    Summary:  See RTW notes FU PRN.

## 2022-02-16 NOTE — TELEPHONE ENCOUNTER
---------------------  From: Deborah Johnson RN (Phone Messages Pool ([address]_[facility]))   To: [facility] Message Pool ([facility]);     Sent: 11/16/2021 12:47:42 PM CST  Subject: Note needed     Phone message    PCP:   Saw KRYSTIN      Time of Call:  1245       Person Calling:  Pt  Phone number:  [phone], [place] to [facility]    Note:   Pt states she forgot to talk to KRYSTIN about her work note that goes through 11/19/21. She would like an extension put on that letter (new letter written with same restrictions).    Pt said she can come pick it up when it is ready. Pt is looking for additional 1-2 weeks for recovery.    Please advise.    Last office visit and reason:  11/16/21, sinus problems---------------------  From: Shaina Harrington CMA ([facility] Message Pool ([facility]))   To: Jurgen Potts PA-C;     Sent: 11/16/2021 1:03:54 PM CST  Subject: FW: Note needed     Okay to change work note through 11-19-21 as requested?---------------------  From: Jurgen Potts PA-C   To: [employer] Message Pool ([facility]);     Sent: 11/16/2021 1:12:12 PM CST  Subject: RE: Note needed     Yes, that is fine    KAHPatient informed of letter to  at the .

## 2022-02-16 NOTE — NURSING NOTE
CAGE Assessment Entered On:  10/7/2020 2:26 PM CDT    Performed On:  10/7/2020 2:26 PM CDT by Dary Pro CMA               Assessment   Have you ever felt you should cut down on your drinking :   No   Have people annoyed you by criticizing your drinking :   No   Have you ever felt bad or guilty about your drinking :   No   Have you ever taken a drink first thing in the morning to steady your nerves or get rid of a hangover (Eye-opener) :   No   CAGE Score :   0    Dary Pro CMA - 10/7/2020 2:26 PM CDT

## 2022-02-16 NOTE — TELEPHONE ENCOUNTER
---------------------  From: Justina Laws MD   Sent: 12/8/2021 5:23:26 PM CST  Subject: MRI shoulder     I called patient with MRI result. Reviewed no tear. Would like to consider physical therapy again. Will check with PT to see if new referral is needed.  MRI shows concerns of frozen shoulder. \    Notes cough is not improving yet with congestion. Will finish treatment and if not improving plan CT chest.

## 2022-02-16 NOTE — PROGRESS NOTES
Patient:   MARLEN ARDON            MRN: [id number]            FIN: [id number]               Age:   51 years     Sex:  Female     :  1970   Associated Diagnoses:   None   Author:   Jurgen Potts PA-C       -   Today's date:  2021 1:39:00 PM .        -   To whom it may concern:        This patient is currently under my care.  He/ she may return to on  2021, with the following restrictions: work limited to  4  hours per day, This is for the next ten days.

## 2022-02-16 NOTE — PROGRESS NOTES
Patient:   MARLEN ARDON            MRN: [id number]            FIN: [id number]               Age:   49 years     Sex:  Female     :  1970   Associated Diagnoses:   Viral enteritis   Author:   Talib Martinez MD      Chief Complaint   2020 2:50 PM CST    Vomiting, diarrhea, tired, just not feeling well started Monday morning, stomach still not feeling well, needs note for work     Chief complaint and symptoms noted above confirmed with patient.      History of Present Illness             The patient presents with diarrhea.  The diarrhea is described as liquid and loose.  The severity of the diarrhea is moderate.  The diarrhea is constant.  The diarrhea has lasted for 3 day(s).        Review of Systems   Constitutional:  Fatigue, No fever, No chills.    Eye:  Negative.    Ear/Nose/Mouth/Throat:  Negative.    Respiratory:  Negative.    Cardiovascular:  Negative.    Gastrointestinal:  Vomiting, Monday.       Health Status   Allergies:    Allergic Reactions (Selected)  Severity Not Documented  Doxycycline (Ill)  Naproxen (No reactions were documented)  Sulfa drug (Hives)   Medications:  (Selected)   Prescriptions  Prescribed  levothyroxine 88 mcg (0.088 mg) oral tablet: = 1 tab(s) ( 88 mcg ), PO, Daily, # 90 tab(s), 3 Refill(s), Type: Maintenance, Pharmacy: [facility], 1 tab(s) Oral daily  Documented Medications  Documented  Calcium 600+D: 2 tab(s), po, daily, 0 Refill(s), Type: Maintenance   Problem list:    All Problems  Acquired hypothyroidism / SNOMED CT 614850676 / Confirmed  Anxiety Disorder / SNOMED CT 5TV07V84-600T-52F3-LT84-OLU76262S029 / Confirmed  Chronic insomnia / SNOMED CT 680687297 / Confirmed  Osteopenia / SNOMED CT 673287853 / Confirmed  Ovarian cyst / SNOMED CT 85ZB98E2-AJ35-3E8S-Y18D-0XEX22S581YZ / Confirmed      Histories   Past Medical History:    Active  Ovarian cyst (SNOMED CT 97BY32W2-ZZ84-3V8A-J95L-8DJA40H957DE)  Comments:  10/17/2013 CDT 11:40 AM DANIELT - Cory CHA,  Yulisa  Right ovary   Family History:    MI  Father  Diabetes mellitus  Mother  Hypertension  Father     Procedure history:    Hysterectomy and unilateral salpingo-oophorectomy sample (SNOMED CT 909241553) performed by Ramesh Shetty MD on 6/2/2005 at 34 Years.  Comments:  10/17/2013 11:39 AM CDT - Yulisa Ray LPN ov elisabet removed.      Physical Examination   Vital Signs   2/13/2020 2:50 PM CST Temperature Tympanic 98.5 DegF    Peripheral Pulse Rate 80 bpm    Pulse Site Radial artery    HR Method Manual    Systolic Blood Pressure 116 mmHg    Diastolic Blood Pressure 70 mmHg    Mean Arterial Pressure 85 mmHg    BP Site Right arm    BP Method Manual      Measurements from flowsheet : Measurements   2/13/2020 2:50 PM CST Height Measured - Standard 62.5 in    Weight Measured - Standard 147 lb    BSA 1.71 m2    Body Mass Index 26.46 kg/m2  HI      General:  Alert and oriented, No acute distress.    Eye:  Pupils are equal, round and reactive to light, Extraocular movements are intact, Normal conjunctiva.    HENT:  Normocephalic, Tympanic membranes are clear, Normal hearing, Oral mucosa is moist.    Neck:  Supple, Non-tender.    Respiratory:  Lungs are clear to auscultation, Respirations are non-labored, Breath sounds are equal.    Cardiovascular:  Normal rate, Regular rhythm, No murmur.    Gastrointestinal:  Soft, Non-tender, Non-distended, Normal bowel sounds, No organomegaly.    Genitourinary:  No costovertebral angle tenderness.       Impression and Plan   Diagnosis     Viral enteritis (QOD13-PO A08.4).     Course:  Progressing as expected.    Plan:  Oral rehydration, Weight monitoring.         Diet: Bananas, Rice, Applesauce and Toast diet.    Orders     SX, RX, Will keep out of work for the rest of the week..     Orders     F/U in 48-72 hours if not improving, sooner if getting worse.

## 2022-02-16 NOTE — PROGRESS NOTES
"   Patient:   MARLEN ARDON            MRN: [id number]            FIN: [id number]               Age:   50 years     Sex:  Female     :  1970   Associated Diagnoses:   Skin lesion   Author:   Sam Iraheta PA-C      Chief Complaint   2020 9:56 AM CDT    Pt here for \"bug bite\" on back of left knee x 1 day.        History of Present Illness   Chief complaint and symptoms noted above and confirmed with patient   red irritated lesion behind left knee, first noticed it yesterday  has put on triple antibiotic ointment on it  no drainage      Review of Systems   Constitutional:  Negative.    Ear/Nose/Mouth/Throat:  Negative.    Respiratory:  Negative.       Health Status   Allergies:    Allergic Reactions (Selected)  Severity Not Documented  Doxycycline (Ill)  Naproxen (No reactions were documented)  Sulfa drug (Hives)   Medications:  (Selected)   Prescriptions  Prescribed  Magic mouthwash: Magic mouthwash, 10 ml QID PRN, Oral, qid, Instructions: 1 Part viscous lidocaine 2%;  1 Part Maalox;  1 Part diphenhydramine 12.5 mg per 5 ml elixir; 120 ml total, Supply, # 120 mL, 0 Refill(s), Type: Maintenance, Pharmacy: [facility], 10 ml QID...  levothyroxine 88 mcg (0.088 mg) oral tablet: = 1 tab(s) ( 88 mcg ), PO, Daily, # 90 tab(s), 1 Refill(s), Type: Maintenance, Pharmacy: [facility], 1 tab(s) Oral daily, 62.5, in, 20 10:35:00 CDT, Height Measured, 142, lb, 20 10:35:00 CDT, Weight Measured  Documented Medications  Documented  Calcium 600+D: 2 tab(s), po, daily, 0 Refill(s), Type: Maintenance  Dietary SUpplement-Acemannan: Dietary SUpplement-Acemannan, 0 Refill(s), Type: Maintenance   Problem list:    All Problems  Anxiety Disorder / SNOMED CT 1PU11L16-609U-95S7-FK44-PSL42970D599 / Confirmed  Chronic insomnia / SNOMED CT 722819158 / Confirmed  Acquired hypothyroidism / SNOMED CT 879141665 / Confirmed  Osteopenia / SNOMED CT 350167919 / Confirmed  Ovarian cyst / SNOMED CT " [id number] / Confirmed      Histories   Past Medical History:    Active  Ovarian cyst ([id number])  Comments:  10/17/2013 CDT 11:40 AM CDT - Yulisa Ray LPN  Right ovary   Family History:    MI  Father  Diabetes mellitus  Mother  Hypertension  Father     Procedure history:    Hysterectomy and unilateral salpingo-oophorectomy sample (560002837) on 6/2/2005 at 34 Years.  Comments:  10/17/2013 11:39 AM CDT - Yulisa Ray LPN  L ov elisabet removed.      Physical Examination   Vital Signs   8/25/2020 9:56 AM CDT Temperature Tympanic 97.3 DegF  LOW    Peripheral Pulse Rate 72 bpm    Pulse Site Radial artery    Respiratory Rate 16 br/min    Systolic Blood Pressure 114 mmHg    Diastolic Blood Pressure 72 mmHg    Mean Arterial Pressure 86 mmHg    BP Site Right arm      Measurements from flowsheet : Measurements   8/25/2020 9:56 AM CDT Height Measured - Standard 62.5 in    Weight Measured - Standard 148 lb    BSA 1.72 m2    Body Mass Index 26.64 kg/m2  HI      General:  No acute distress.    Integumentary:  behind left knee there is a 2 cm red inflamed indurated skin lesion,  no drainage.       Impression and Plan   Diagnosis     Skin lesion (MOC50-LW L98.9).     Summary:  will treat with triamcinolone cream, also take claritin in the morning, benadryl qhs  follow up if not improving.    Orders     Orders   Pharmacy:  triamcinolone 0.1% topical cream (Prescribe): 1 anthony, TOP, tid, Instructions: maximum use 3 weeks, then can use intermittently, # 30 g, 0 Refill(s), Type: Maintenance, Pharmacy: [facility], 1 anthony Topical tid,Instr:maximum use 3 weeks, then can use intermittently, 62.5, in, 8/25/2020 9:56 AM CDT, Height Measured, 148, lb, 8/25/2020 9:56 AM CDT, Weight Measured.     Orders   Charges (Evaluation and Management):  76518 office outpatient visit 15 minutes (Charge) (Order): Quantity: 1, Skin lesion.

## 2022-02-16 NOTE — NURSING NOTE
Comprehensive Intake Entered On:  3/19/2019 6:17 PM CDT    Performed On:  3/19/2019 6:09 PM CDT by Kim Colby CMA   Chief Complaint :   follow-up from visit on Friday 3/15/19;    Menstrual Status :   Hysterectomy   Weight Measured :   146.0 lb(Converted to: 146 lb 0 oz, 66.22 kg)    Height Measured :   62.5 in(Converted to: 5 ft 2 in, 158.75 cm)    Body Mass Index :   26.28 kg/m2 (HI)    Body Surface Area :   1.71 m2   Systolic Blood Pressure :   110 mmHg   Diastolic Blood Pressure :   60 mmHg   Mean Arterial Pressure :   77 mmHg   Peripheral Pulse Rate :   70 bpm   BP Method :   Manual   HR Method :   Manual   Kim Colby CMA - 3/19/2019 6:09 PM CDT   Health Status   Allergies Verified? :   Yes   Medication History Verified? :   Yes   Immunizations Current :   Other: Declined TDAP.   Medical History Verified? :   Yes   Pre-Visit Planning Status :   Completed   Tobacco Use? :   Former smoker   Kim Colby CMA - 3/19/2019 6:09 PM CDT   Consents   Consent for Immunization Exchange :   Consent Granted   Consent for Immunizations to Providers :   Consent Granted   Kim Colby CMA - 3/19/2019 6:09 PM CDT   Meds / Allergies   (As Of: 3/19/2019 6:17:09 PM CDT)   Allergies (Active)   doxycycline  Estimated Onset Date:   Unspecified ; Reactions:   Ill ; Created By:   Nena Edouard CMA; Reaction Status:   Active ; Category:   Drug ; Substance:   doxycycline ; Type:   Allergy ; Updated By:   Nena Edouard CMA; Reviewed Date:   3/19/2019 6:09 PM CDT      naproxen  Estimated Onset Date:   Unspecified ; Created By:   Yulisa Ray LPN; Reaction Status:   Active ; Category:   Drug ; Substance:   naproxen ; Type:   Allergy ; Updated By:   Yulisa Ray LPN; Reviewed Date:   3/19/2019 6:09 PM CDT      sulfa drug  Estimated Onset Date:   Unspecified ; Reactions:   Hives ; Created By:   Yulisa Ray LPN; Reaction Status:   Active ; Category:   Drug ; Substance:   sulfa drug ; Type:   Allergy ;  Updated By:   Yulisa Ray LPN; Reviewed Date:   3/19/2019 6:09 PM CDT        Medication List   (As Of: 3/19/2019 6:17:09 PM CDT)   Prescription/Discharge Order    ciprofloxacin  :   ciprofloxacin ; Status:   Prescribed ; Ordered As Mnemonic:   Cipro 500 mg oral tablet ; Simple Display Line:   500 mg, 1 tab(s), PO, q12hr, for 7 day(s), 14 tab(s), 0 Refill(s) ; Ordering Provider:   Jurgen Potts PA-C; Catalog Code:   ciprofloxacin ; Order Dt/Tm:   3/15/2019 12:02:01 PM          levothyroxine  :   levothyroxine ; Status:   Prescribed ; Ordered As Mnemonic:   levothyroxine 100 mcg (0.1 mg) oral tablet ; Simple Display Line:   100 mcg, 1 tab(s), PO, Daily, 90 tab(s), 3 Refill(s) ; Ordering Provider:   Marc Laws [facility]; Catalog Code:   levothyroxine ; Order Dt/Tm:   9/7/2018 7:57:28 AM          metroNIDAZOLE  :   metroNIDAZOLE ; Status:   Prescribed ; Ordered As Mnemonic:   Flagyl 500 mg oral tablet ; Simple Display Line:   500 mg, 1 tab(s), PO, q8hr, for 7 day(s), 21 tab(s), 0 Refill(s) ; Ordering Provider:   Jurgen Potts PA-C; Catalog Code:   metroNIDAZOLE ; Order Dt/Tm:   3/15/2019 12:02:15 PM            Home Meds    calcium-vitamin D  :   calcium-vitamin D ; Status:   Documented ; Ordered As Mnemonic:   Calcium 600+D ; Simple Display Line:   2 tab(s), po, daily ; Catalog Code:   calcium-vitamin D ; Order Dt/Tm:   8/4/2014 9:31:21 AM

## 2022-02-16 NOTE — NURSING NOTE
Comprehensive Intake Entered On:  2/3/2021 11:12 AM CST    Performed On:  2/3/2021 11:09 AM CST by Dary Pro CMA               Summary   Chief Complaint :   C/o possible shingles on R flank. Noticed yesterday. Painful.    Menstrual Status :   Hysterectomy   Weight Measured :   140 lb(Converted to: 140 lb 0 oz, 63.503 kg)    Height Measured :   62.5 in(Converted to: 5 ft 2 in, 158.75 cm)    Body Mass Index :   25.2 kg/m2 (HI)    Body Surface Area :   1.67 m2   Systolic Blood Pressure :   110 mmHg   Diastolic Blood Pressure :   74 mmHg   Mean Arterial Pressure :   86 mmHg   Peripheral Pulse Rate :   119 bpm (HI)    BP Site :   Right arm   BP Method :   Manual   Temperature Tympanic :   96.4 DegF(Converted to: 35.8 DegC)  (LOW)    Oxygen Saturation :   98 %   Dary Pro CMA - 2/3/2021 11:09 AM CST   Health Status   Allergies Verified? :   Yes   Medication History Verified? :   Yes   Immunizations Current :   Other: Declined TDAP.   Pre-Visit Planning Status :   Completed   Tobacco Use? :   Former smoker   Dary Pro CMA - 2/3/2021 11:09 AM CST   Consents   Consent for Immunization Exchange :   Consent Granted   Consent for Immunizations to Providers :   Consent Granted   Dary Pro CMA - 2/3/2021 11:09 AM CST   Meds / Allergies   (As Of: 2/3/2021 11:12:22 AM CST)   Allergies (Active)   doxycycline  Estimated Onset Date:   Unspecified ; Reactions:   Ill ; Created By:   Nena Edouard CMA; Reaction Status:   Active ; Category:   Drug ; Substance:   doxycycline ; Type:   Allergy ; Updated By:   Nena Edouard CMA; Reviewed Date:   1/21/2021 11:12 AM CST      naproxen  Estimated Onset Date:   Unspecified ; Created By:   Yulisa Ray LPN; Reaction Status:   Active ; Category:   Drug ; Substance:   naproxen ; Type:   Allergy ; Updated By:   Yulisa Ray LPN; Reviewed Date:   1/21/2021 11:12 AM CST      sulfa drug  Estimated Onset Date:   Unspecified ; Reactions:   Hives ; Created By:   Yulisa Ray LPN;  Reaction Status:   Active ; Category:   Drug ; Substance:   sulfa drug ; Type:   Allergy ; Updated By:   Yulisa Ray LPN; Reviewed Date:   1/21/2021 11:12 AM CST        Medication List   (As Of: 2/3/2021 11:12:22 AM CST)   Prescription/Discharge Order    levothyroxine  :   levothyroxine ; Status:   Prescribed ; Ordered As Mnemonic:   levothyroxine 88 mcg (0.088 mg) oral tablet ; Simple Display Line:   1 tab(s), Oral, daily, INCREASE., 90 tab(s), 2 Refill(s) ; Ordering Provider:   Justina Laws MD; Catalog Code:   levothyroxine ; Order Dt/Tm:   12/23/2020 4:32:41 PM CST          escitalopram  :   escitalopram ; Status:   Prescribed ; Ordered As Mnemonic:   Lexapro 10 mg oral tablet ; Simple Display Line:   10 mg, 1 tab(s), PO, Daily, 90 tab(s), 1 Refill(s) ; Ordering Provider:   Justina Laws MD; Catalog Code:   escitalopram ; Order Dt/Tm:   10/7/2020 11:54:00 AM CDT            Home Meds    Miscellaneous Prescription  :   Miscellaneous Prescription ; Status:   Documented ; Ordered As Mnemonic:   Dietary SUpplement-Acemannan ; Simple Display Line:   0 Refill(s) ; Catalog Code:   Miscellaneous Prescription ; Order Dt/Tm:   8/25/2020 10:00:56 AM CDT          calcium-vitamin D  :   calcium-vitamin D ; Status:   Documented ; Ordered As Mnemonic:   Calcium 600+D ; Simple Display Line:   2 tab(s), po, daily ; Catalog Code:   calcium-vitamin D ; Order Dt/Tm:   8/4/2014 9:31:21 AM CDT            ID Risk Screen   Recent Travel History :   No recent travel   Family Member Travel History :   No recent travel   Other Exposure to Infectious Disease :   Unknown   COVID-19 Testing Status :   No COVID-19 test performed   Dary Pro CMA - 2/3/2021 11:09 AM CST

## 2022-02-16 NOTE — TELEPHONE ENCOUNTER
Patient:   MARLEN ARDON            MRN: [id number]            FIN: [id number]               Age:   51 years     Sex:  Female     :  1970   Associated Diagnoses:   None   Author:   Shaina Harrington CMA        Work or School Excuse *     Patient:   MARLEN ARDON            MRN: [id number]            FIN: [id number]               Age:   51 years     Sex:  Female     :  1970   Associated Diagnoses:   None   Author:   Jurgen Potts PA-C       -   Today's date:  2021 10:13:00 AM .        -   To whom it may concern:        This patient is currently under my care and Was seen in my office on  2021 10:13:00 AM.  .     Please excuse him/ her from work, Off Nov .          Sincerely,      Jurgen Potts PA-C      Signed and Authored by Jurgen Potts PA-C on 2021 10:14 AM CST

## 2022-02-16 NOTE — NURSING NOTE
Comprehensive Intake Entered On:  2021 1:41 PM CDT    Performed On:  2021 1:37 PM CDT by Shaina Harrington CMA               Summary   Chief Complaint :   Right shoulder pain x1 week    Menstrual Status :   Hysterectomy   Weight Measured :   139 lb(Converted to: 139 lb 0 oz, 63.049 kg)    Height Measured :   62.5 in(Converted to: 5 ft 2 in, 158.75 cm)    Body Mass Index :   25.02 kg/m2 (HI)    Body Surface Area :   1.67 m2   Systolic Blood Pressure :   120 mmHg   Diastolic Blood Pressure :   80 mmHg   Mean Arterial Pressure :   93 mmHg   Peripheral Pulse Rate :   104 bpm (HI)    BP Site :   Right arm   Pulse Site :   Radial artery   BP Method :   Manual   HR Method :   Electronic   Temperature Tympanic :   98.0 DegF(Converted to: 36.7 DegC)    Oxygen Saturation :   99 %   Shaina Harrington CMA - 2021 1:37 PM CDT   Health Status   Allergies Verified? :   Yes   Medication History Verified? :   Yes   Immunizations Current :   Other: Declined TDAP.   Medical History Verified? :   Yes   Tobacco Use? :   Former smoker   Shaina Harrington CMA - 2021 1:37 PM CDT   Demographics   Last Name :   Chuck   Address :   [address] [place] [address]   First Name :   Martina Keane   Responsible Party Address 2 :   [address]   Responsible Party Date of Birth () :   1970 CDT   City :   [place]   State :   [place]   Zip Code :   [address]   Shaina Harrington CMA - 2021 1:37 PM CDT   Providers Grid   Provider Name :    Dr. Urbina              Provider Specialty :    PsyD[counselor].              Comments :    Shaina Evans CMA - 2021 1:37 PM CDT         Consents   Consent for Immunization Exchange :   Consent Granted   Consent for Immunizations to Providers :   Consent Granted   Shaina Harrington CMA - 2021 1:37 PM CDT   Meds / Allergies   (As Of: 2021 1:41:58 PM CDT)   Allergies (Active)   doxycycline  Estimated Onset Date:   Unspecified ; Reactions:   Ill ; Created By:   Nena Edouard CMA;  Reaction Status:   Active ; Category:   Drug ; Substance:   doxycycline ; Type:   Allergy ; Updated By:   Nena Edouard CMA; Reviewed Date:   6/23/2021 1:39 PM CDT      naproxen  Estimated Onset Date:   Unspecified ; Created By:   Yulisa Ray LPN; Reaction Status:   Active ; Category:   Drug ; Substance:   naproxen ; Type:   Allergy ; Updated By:   Yulisa Ray LPN; Reviewed Date:   6/23/2021 1:39 PM CDT      sulfa drug  Estimated Onset Date:   Unspecified ; Reactions:   Hives ; Created By:   Yulisa Ray LPN; Reaction Status:   Active ; Category:   Drug ; Substance:   sulfa drug ; Type:   Allergy ; Updated By:   Yulisa Ray LPN; Reviewed Date:   6/23/2021 1:39 PM CDT        Medication List   (As Of: 6/23/2021 1:41:58 PM CDT)   Prescription/Discharge Order    escitalopram  :   escitalopram ; Status:   Prescribed ; Ordered As Mnemonic:   escitalopram 10 mg oral tablet ; Simple Display Line:   1 tab(s), Oral, daily, 90 tab(s), 0 Refill(s) ; Ordering Provider:   Justina Laws MD; Catalog Code:   escitalopram ; Order Dt/Tm:   4/13/2021 12:48:23 PM CDT          hydrocortisone/neomycin/polymyxin B otic  :   hydrocortisone/neomycin/polymyxin B otic ; Status:   Completed ; Ordered As Mnemonic:   hydrocortisone/neomycin/polymyxin B 1%-0.35%-10,000 units/mL otic solution ; Simple Display Line:   2 drop(s), Ear-Left, tid, for 7 day(s), 10 mL, 0 Refill(s) ; Ordering Provider:   Vanessa Brown; Catalog Code:   hydrocortisone/neomycin/polymyxin B otic ; Order Dt/Tm:   3/2/2021 12:13:13 PM CST          levothyroxine  :   levothyroxine ; Status:   Prescribed ; Ordered As Mnemonic:   levothyroxine 88 mcg (0.088 mg) oral tablet ; Simple Display Line:   1 tab(s), Oral, daily, INCREASE., 90 tab(s), 2 Refill(s) ; Ordering Provider:   Justina Laws MD; Catalog Code:   levothyroxine ; Order Dt/Tm:   12/23/2020 4:32:41 PM CST            Home Meds    docusate  :   docusate ; Status:   Documented ; Ordered As Mnemonic:    Colace 50 mg oral capsule ; Simple Display Line:   50 mg, 1 cap(s), Oral, bid, 0 Refill(s) ; Catalog Code:   docusate ; Order Dt/Tm:   6/23/2021 1:39:09 PM CDT          calcium-vitamin D  :   calcium-vitamin D ; Status:   Documented ; Ordered As Mnemonic:   Calcium 600+D ; Simple Display Line:   2 tab(s), po, daily ; Catalog Code:   calcium-vitamin D ; Order Dt/Tm:   8/4/2014 9:31:21 AM CDT            ID Risk Screen   Recent Travel History :   No recent travel   Family Member Travel History :   No recent travel   Other Exposure to Infectious Disease :   Unknown   COVID-19 Testing Status :   No positive COVID-19 test   AndShaina rashid CMA - 6/23/2021 1:37 PM CDT   Social History   Social History   (As Of: 6/23/2021 1:41:58 PM CDT)   Alcohol:        1 x per month, 1 drinks/episode average.  2 drinks/episode maximum.   (Last Updated: 3/13/2017 3:39:56 PM CDT by Yulisa Ray LPN)          Tobacco:  Past      Former smoker, quit more than 30 days ago   (Last Updated: 12/29/2020 10:42:34 AM CST by Dary Pro CMA)          Electronic Cigarette/Vaping:  Denies Electronic Cigarette Use      Electronic Cigarette Use: Never.   (Last Updated: 12/29/2020 10:43:04 AM CST by Dary Pro CMA)          Home/Environment:        Marital status:  x 2.   (Last Updated: 9/18/2014 9:11:25 AM CDT by Rama Pemberton)          Nutrition/Health:        Type of diet: Regular.   (Last Updated: 10/17/2013 11:40:58 AM CDT by Yulisa Ray LPN)          Exercise:  Regular exercise      (Last Updated: 10/17/2013 11:40:49 AM CDT by Yulisa Ray LPN )

## 2022-02-16 NOTE — PROGRESS NOTES
Patient:   MARLEN ARDON            MRN: [id number]            FIN: [id number]               Age:   51 years     Sex:  Female     :  1970   Associated Diagnoses:   STOVER (dyspnea on exertion); COVID-19 virus infection   Author:   Jurgen Potts PA-C      Report Summary   Diagnosis  STOVER (dyspnea on exertion) (HQV37-XB R06.00).  Patient InstructionsSummary   Chief Complaint       2021 4:34 PM CST Follow up ER 21 dyspnea and post COVID fatigue, headache, needing ppw completed for work   2021 1:55 PM CST Here to fill out ppw for work         History of Present Illness             The patient presents with dyspnea.     Still having some dyspnea and fatigue after the Covid infection.      Review of Systems   Constitutional:  Negative.       Health Status   Allergies:    Allergic Reactions (Selected)  Severity Not Documented  Doxycycline (Ill)  Naproxen (No reactions were documented)  Sulfa drug (Hives)   Medications:  (Selected)   Prescriptions  Prescribed  Albuterol (Eqv-ProAir HFA) 90 mcg/inh inhalation aerosol: 2 puff(s), Inhale, q6 hrs, # 6.7 gm, 0 Refill(s), Type: Maintenance, Pharmacy: [facility], 2 puff(s) Inhale q6 hrs, 62.5, in, 10/11/21 16:26:00 CDT, Height Measured, 139.5, lb, 10/11/21 16:26:00 CDT, Weight Measured  Flovent  mcg/inh inhalation aerosol: ( 220 mcg ), Inhale, bid, # 1 EA, 0 Refill(s), Type: Maintenance, Pharmacy: [facility], 220 mcg Inhale bid, 62.5, in, 10/27/21 13:14:00 CDT, Height Measured, 140.7, lb, 10/27/21 13:14:00 CDT, Weight Measured  levothyroxine 88 mcg (0.088 mg) oral tablet: = 1 tab(s), Oral, daily, Instructions: INCREASE., # 90 tab(s), 2 Refill(s), Type: Maintenance, Pharmacy: [facility], TAKE 1 TABLET BY MOUTH ONCE DAILY ( INCREASE ), 62.5, in, 10/07/20 11:31:00 CDT, Height Measured, 149, lb, 10/07/20 11:31:00 CDT,...  Documented Medications  Documented  Calcium 600+D: 2 tab(s), po, daily, 0 Refill(s), Type: Maintenance  Colace 50 mg  oral capsule: = 1 cap(s) ( 50 mg ), Oral, bid, 0 Refill(s), Type: Maintenance   Problem list:    All Problems  Unspecified abdominal pain / SNOMED CT 49564702 / Confirmed  Ovarian cyst / SNOMED CT 48EM64O7-KX91-4M0N-[id number] / Confirmed  Osteopenia / SNOMED CT 484859530 / Confirmed  Chronic insomnia / SNOMED CT 939477939 / Confirmed  Anxiety Disorder / SNOMED CT 2LT35I62-863D-32G2-[id number] / Confirmed  Acquired hypothyroidism / SNOMED CT 118768085 / Confirmed      Histories   Past Medical History:    Active  Ovarian cyst ([id number])  Comments:  10/17/2013 CDT 11:40 AM DANIELT - Yulisa Ray LPN  Right ovary  Osteopenia (357555252)  Chronic insomnia (143843694)  Anxiety Disorder ([id number])  Acquired hypothyroidism (775753176)  Unspecified abdominal pain (17685744)   Family History:    MI  Father  Diabetes mellitus  Mother  Hypertension  Father     Procedure history:    Colonoscopy ([id number]) on 3/26/2021 at 50 Years.  Comments:  4/15/2021 7:59 AM CDT - Rama Pemberton  Indication: Left lower quadrant abdominal pain.   Sedation: Versed, Fentanyl.  Findings: Normal with the exception of severe diverticulosis in descending and sigmoid colon.  Hysterectomy and unilateral salpingo-oophorectomy sample ([id number]) on 6/2/2005 at 34 Years.  Comments:  10/17/2013 11:39 AM CDT - Yulisa Ray LPN ov elisabet removed.   Social History:        Electronic Cigarette/Vaping Assessment: Denies Electronic Cigarette Use            Electronic Cigarette Use: Never.      Alcohol Assessment            1 x per month, 1 drinks/episode average.  2 drinks/episode maximum.      Tobacco Assessment: Past            Former smoker, quit more than 30 days ago      Home and Environment Assessment            Marital status:  x 2.      Nutrition and Health Assessment            Type of diet: Regular.      Exercise and Physical Activity Assessment: Regular exercise         Physical Examination   Vital Signs   11/24/2021 4:34 PM CST Temperature Tympanic 98.5 DegF    Peripheral Pulse Rate 88 bpm    Pulse Site Radial artery    HR Method Electronic    Systolic Blood Pressure 120 mmHg    Diastolic Blood Pressure 80 mmHg    Mean Arterial Pressure 93 mmHg    BP Site Right arm    BP Method Manual    Oxygen Saturation 98 %   11/23/2021 1:55 PM CST Temperature Tympanic 97.9 DegF    Peripheral Pulse Rate 110 bpm  HI    HR Method Electronic    Systolic Blood Pressure 124 mmHg    Diastolic Blood Pressure 76 mmHg    Mean Arterial Pressure 92 mmHg    BP Site Right arm    BP Method Manual    Oxygen Saturation 94 %      Measurements from flowsheet : Measurements   11/24/2021 4:34 PM CST Height Measured - Standard 62.5 in    Weight Measured - Standard 142 lb    BSA 1.68 m2    Body Mass Index 25.56 kg/m2  HI   11/23/2021 1:55 PM CST Height Measured - Standard 62.5 in    Weight Measured - Standard 143 lb    BSA 1.69 m2    Body Mass Index 25.74 kg/m2  HI      Respiratory:  Lungs are clear to auscultation, Respirations are non-labored.    Cardiovascular:  Normal rate, Regular rhythm.       Impression and Plan   Diagnosis     STOVER (dyspnea on exertion) (UZD67-TA R06.00).     COVID-19 virus infection (LWG05-GD U07.1).     Patient Instructions:       Counseled: Patient.    Summary:  See forms in chart. I am unable to predict when her symptoms will improve, or hopefully resolve..

## 2022-02-16 NOTE — TELEPHONE ENCOUNTER
Entered by Nena Edouard CMA on April 13, 2021 12:48:46 PM CDT  ---------------------  From: Nena Edouard CMA   To: [facility]    Sent: 4/13/2021 12:48:46 PM CDT  Subject: Medication Management     ** Submitted: **  Order:escitalopram (escitalopram 10 mg oral tablet)  1 tab(s)  Oral  daily  Qty:  90 tab(s)        Days Supply:  90        Refills:  0          Substitutions Allowed     Route To [facility]    Signed by Nena Edouard CMA  4/13/2021 5:48:00 PM [facility]    ** Submitted: **  Complete:escitalopram (Lexapro 10 mg oral tablet)   Signed by Nena Edouard CMA  4/13/2021 5:48:00 PM [facility]    ** Not Approved:  **  escitalopram (ESCITALOPRAM 10 MG TABLET 10 Tablet)  TAKE 1 TABLET BY MOUTH ONCE DAILY  Qty:  90 tab(s)        Days Supply:  90        Refills:  1          Substitutions Allowed     Route To [facility]   Signed by Nena Edouard CMA            ------------------------------------------  From: [facility]  To: Justina Laws MD  Sent: April 10, 2021 11:10:46 AM CDT  Subject: Medication Management  Due: April 7, 2021 1:46:04 PM CDT     ** On Hold Pending Signature **     Drug: escitalopram (Lexapro 10 mg oral tablet), 1 tab(s) Oral daily  Quantity: 90 tab(s)  Days Supply: 0  Refills: 0  Substitutions Allowed  Notes from Pharmacy:     Dispensed Drug: escitalopram (escitalopram 10 mg oral tablet), TAKE 1 TABLET BY MOUTH ONCE DAILY  Quantity: 90 tab(s)  Days Supply: 90  Refills: 1  Substitutions Allowed  Notes from Pharmacy:  ------------------------------------------3/2/21 ear pain  7/13/20 hypothyroid   [facility]

## 2022-02-16 NOTE — PROGRESS NOTES
Patient:   MARLEN ARDON            MRN: [id number]            FIN: [id number]               Age:   47 years     Sex:  Female     :  1970   Associated Diagnoses:   Hives   Author:   Talib Martinez MD      Subjective   Chief complaint 2017 10:13 AM CDT   c/o rash over whole body that comes and goes x 2 weeks   Noted as above. Discussed with Patient..        Health Status   Allergies:    Allergic Reactions (Selected)  Severity Not Documented  Doxycycline (Ill)  Naproxen (No reactions were documented)  Sulfa drug (Hives)   Medications:  (Selected)   Prescriptions  Prescribed  levothyroxine 88 mcg (0.088 mg) oral tablet: 1 tab(s) ( 88 mcg ), PO, Daily, # 90 tab(s), 0 Refill(s), Type: Maintenance, Pharmacy: [id number], 1 tab(s) po daily  Documented Medications  Documented  Calcium 600+D: 2 tab(s), po, daily, 0 Refill(s), Type: Maintenance   Problem list:    All Problems  Anxiety Disorder / SNOMED CT [id number] / Confirmed  Acquired hypothyroidism / SNOMED CT 412733864 / Confirmed  Chronic insomnia / SNOMED CT 799771497 / Confirmed  Osteopenia / SNOMED CT 025258739 / Confirmed  Ovarian cyst / SNOMED CT [id number] / Confirmed      Objective   Vital Signs   2017 10:13 AM CDT Temperature Temporal 98.2 DegF    Peripheral Pulse Rate 96 bpm    Pulse Site Radial artery    HR Method Manual    Systolic Blood Pressure 112 mmHg    Diastolic Blood Pressure 66 mmHg    Mean Arterial Pressure 81 mmHg    BP Site Left arm    BP Method Manual      Measurements from flowsheet : Measurements   2017 10:13 AM CDT Height Measured - Standard 62.5 in    Weight Measured - Standard 139 lb    BSA 1.67 m2    Body Mass Index 25.02 kg/m2      General:  Alert and oriented, No acute distress.    Neck:  Supple.    Respiratory:  Lungs are clear to auscultation.    Cardiovascular:  Normal rate, Regular rhythm.    Integumentary:  Hives to elbow.       Results  Review   Results review   Lab results   6/22/2017 1:41 PM CDT Sodium Level 141 mmol/L    Potassium Level 4.6 mmol/L    Chloride Level 105 mmol/L    CO2 Level 28 mmol/L    Glucose Level 87 mg/dL    BUN 13 mg/dL    Creatinine 0.76 mg/dL    BUN/Creat Ratio NOT APPLICABLE    eGFR 94 mL/min/1.73m2    eGFR African American 109 mL/min/1.73m2    Calcium Level 9.8 mg/dL    TSH 5.89 mIU/L  HI    WBC 5.3    RBC 4.93    Hgb 14.1 gm/dL    Hct 42.7 %    MCV 86.6 fL    MCH 28.6 pg    MCHC 33.0 gm/dL    RDW 12.9 %    Platelet 304    MPV 8.9 fL    Lymphs 25.2 %    Abs Lymphs 1,336    Neutrophils 63.3 %    Abs Neutrophils 3,355    Monocytes 9.0 %    Abs Monocytes 477    Eosinophils 1.9 %    Abs Eosinophils 101    Basophils 0.6 %    Abs Basophils 32    Sed Rate 6    Lyme Ab IgG/IgM West Blot <0.90         Impression and Plan   Assessment and Plan:          Diagnosis: Hives (CCN70-LJ L50.9).    Orders     Orders   Pharmacy:  fexofenadine 180 mg oral tablet (Prescribe): 1 tab(s) ( 180 mg ), PO, Daily, PRN: as needed for allergy symptoms, # 30 tab(s), 5 Refill(s), Type: Maintenance, Pharmacy: [id number], 1 tab(s) po daily,PRN:as needed for allergy symptoms.     .    Assessment and Plan:  Orders     More than half of a 15 minute visit spent on counseling the above problems..     .

## 2022-02-16 NOTE — NURSING NOTE
Comprehensive Intake Entered On:  9/28/2020 4:22 PM CDT    Performed On:  9/28/2020 4:15 PM CDT by Bernice Garcia CMA               Summary   Chief Complaint :   Concerns with abd pain on/off x 1 month. Denies nausea, vomiting or diarrhea. BMs ranges from 1-3 days. Also discuss neck goiter.    Menstrual Status :   Hysterectomy   Weight Measured :   148 lb(Converted to: 148 lb 0 oz, 67.13 kg)    Systolic Blood Pressure :   108 mmHg   Diastolic Blood Pressure :   74 mmHg   Mean Arterial Pressure :   85 mmHg   Peripheral Pulse Rate :   92 bpm   BP Site :   Right arm   Pulse Site :   Radial artery   BP Method :   Manual   HR Method :   Manual   Temperature Tympanic :   97.9 DegF(Converted to: 36.6 DegC)    Bernice Garcia CMA - 9/28/2020 4:15 PM CDT   Health Status   Allergies Verified? :   Yes   Medication History Verified? :   Yes   Immunizations Current :   Other: Declined TDAP.   Pre-Visit Planning Status :   Not completed   Tobacco Use? :   Former smoker   Bernice Garcia CMA - 9/28/2020 4:15 PM CDT   Meds / Allergies   (As Of: 9/28/2020 4:22:13 PM CDT)   Allergies (Active)   doxycycline  Estimated Onset Date:   Unspecified ; Reactions:   Ill ; Created By:   Nena Edouard CMA; Reaction Status:   Active ; Category:   Drug ; Substance:   doxycycline ; Type:   Allergy ; Updated By:   Nena Edouard CMA; Reviewed Date:   8/25/2020 10:08 AM CDT      naproxen  Estimated Onset Date:   Unspecified ; Created By:   Yulisa Ray LPN; Reaction Status:   Active ; Category:   Drug ; Substance:   naproxen ; Type:   Allergy ; Updated By:   Yulisa Ray LPN; Reviewed Date:   8/25/2020 10:08 AM CDT      sulfa drug  Estimated Onset Date:   Unspecified ; Reactions:   Hives ; Created By:   Yulisa Ray LPN; Reaction Status:   Active ; Category:   Drug ; Substance:   sulfa drug ; Type:   Allergy ; Updated By:   Yulisa Ray LPN; Reviewed Date:   8/25/2020 10:08 AM CDT        Medication List   (As Of: 9/28/2020 4:22:13 PM CDT)    Prescription/Discharge Order    levothyroxine  :   levothyroxine ; Status:   Prescribed ; Ordered As Mnemonic:   levothyroxine 88 mcg (0.088 mg) oral tablet ; Simple Display Line:   88 mcg, 1 tab(s), PO, Daily, 90 tab(s), 1 Refill(s) ; Ordering Provider:   Justina Laws MD; Catalog Code:   levothyroxine ; Order Dt/Tm:   7/14/2020 11:38:00 AM CDT          Miscellaneous Rx Supply  :   Miscellaneous Rx Supply ; Status:   Prescribed ; Ordered As Mnemonic:   Magic mouthwash ; Simple Display Line:   10 ml QID PRN, Oral, qid, 1 Part viscous lidocaine 2%;  1 Part Maalox;  1 Part diphenhydramine 12.5 mg per 5 ml elixir; 120 ml total, 120 mL, 0 Refill(s) ; Ordering Provider:   Jurgen Potts PA-C; Catalog Code:   Miscellaneous Rx Supply ; Order Dt/Tm:   5/29/2020 11:20:46 AM CDT          triamcinolone topical  :   triamcinolone topical ; Status:   Prescribed ; Ordered As Mnemonic:   triamcinolone 0.1% topical cream ; Simple Display Line:   1 anthony, TOP, tid, maximum use 3 weeks, then can use intermittently, 30 g, 0 Refill(s) ; Ordering Provider:   Sam Iraheta PA-C; Catalog Code:   triamcinolone topical ; Order Dt/Tm:   8/25/2020 10:13:38 AM CDT            Home Meds    calcium-vitamin D  :   calcium-vitamin D ; Status:   Documented ; Ordered As Mnemonic:   Calcium 600+D ; Simple Display Line:   2 tab(s), po, daily ; Catalog Code:   calcium-vitamin D ; Order Dt/Tm:   8/4/2014 9:31:21 AM CDT          Miscellaneous Prescription  :   Miscellaneous Prescription ; Status:   Documented ; Ordered As Mnemonic:   Dietary SUpplement-Acemannan ; Simple Display Line:   0 Refill(s) ; Catalog Code:   Miscellaneous Prescription ; Order Dt/Tm:   8/25/2020 10:00:56 AM CDT            ID Risk Screen   Recent Travel History :   No recent travel   Family Member Travel History :   No recent travel   Other Exposure to Infectious Disease :   Unknown   Bernice Garcia CMA - 9/28/2020 4:15 PM CDT

## 2022-02-16 NOTE — TELEPHONE ENCOUNTER
---------------------  From: Kim Colby CMA (Phone Messages [employer] ([id number]_[place] [facility] [place]))   To: Justina Laws MD;     Sent: 7/17/2020 3:55:08 PM CDT  Subject: Phone Message Labs     PCP:   CHILANGO      Time of Call:  3:25pm       Person Calling:  Martina Keane  Phone number:  [phone]  Leave a detailed Message:     Returned call at: 350pm    Note:   Patient called, she has questions about her labs. She is wondering if there is anything she should do about some of her Cholesterol levels being high. She is okay with the dose increase on her Levothyroxine.     Patient is okay that KWL is out of the office and is okay to wait for her return.     Last office visit and reason:  7/13/2020     Pharmacy: [facility] to: CHIL[facility]---------------------  From: Michelle GASPAR [place]   To: Phone Messages Pool ([id number]_[place] [facility] [place]);     Sent: 7/20/2020 9:05:27 AM CDT  Subject: RE: Phone Message Labs     Please call and let her know that her 10 year risk of heart disease is less than 2% and because of that all she needs to do is follow a heart health diet.  Repeat labs in a year.    CHTReturned Call  Time: 12:45 pm  Note:  Called & notified pt of the diet suggestion and she will look into this and continue to exercise.

## 2022-02-16 NOTE — NURSING NOTE
Comprehensive Intake Entered On:  9/9/2021 9:48 AM CDT    Performed On:  9/9/2021 9:42 AM CDT by Honey Taylor               Summary   Chief Complaint :   Pt c/o her ears hurting. 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    Menstrual Status :   Hysterectomy   Weight Measured :   142.6 lb(Converted to: 142 lb 10 oz, 64.682 kg)    Systolic Blood Pressure :   104 mmHg   Diastolic Blood Pressure :   70 mmHg   Mean Arterial Pressure :   81 mmHg   Peripheral Pulse Rate :   92 bpm   BP Site :   Right arm   BP Method :   Manual   Temperature Oral :   98.1 DegF(Converted to: 36.7 DegC)    Respiratory Rate :   16 br/min   Oxygen Saturation :   100 %   Honey Taylor - 9/9/2021 9:42 AM CDT   Health Status   Allergies Verified? :   Yes   Medication History Verified? :   Yes   Immunizations Current :   Other: Declined TDAP.   Pre-Visit Planning Status :   Completed   Tobacco Use? :   Former smoker   Honey Taylor - 9/9/2021 9:42 AM CDT   Consents   Consent for Immunization Exchange :   Consent Granted   Consent for Immunizations to Providers :   Consent Granted   Honey Taylor - 9/9/2021 9:42 AM CDT   Meds / Allergies   (As Of: 9/9/2021 9:48:26 AM CDT)   Allergies (Active)   doxycycline  Estimated Onset Date:   Unspecified ; Reactions:   Ill ; Created By:   Nena Edouard CMA; Reaction Status:   Active ; Category:   Drug ; Substance:   doxycycline ; Type:   Allergy ; Updated By:   Nena Edouard CMA; Reviewed Date:   9/9/2021 9:47 AM CDT      naproxen  Estimated Onset Date:   Unspecified ; Created By:   Yulisa Ray LPN; Reaction Status:   Active ; Category:   Drug ; Substance:   naproxen ; Type:   Allergy ; Updated By:   Yulisa Ray LPN; Reviewed Date:   9/9/2021 9:47 AM CDT      sulfa drug  Estimated Onset Date:   Unspecified ; Reactions:   Hives ; Created By:   Yulisa Ray LPN; Reaction Status:   Active ; Category:   Drug ; Substance:   sulfa drug ; Type:   Allergy  ; Updated By:   Yulisa Ray LPN; Reviewed Date:   9/9/2021 9:47 AM CDT        Medication List   (As Of: 9/9/2021 9:48:26 AM CDT)   Prescription/Discharge Order    escitalopram  :   escitalopram ; Status:   Prescribed ; Ordered As Mnemonic:   escitalopram 10 mg oral tablet ; Simple Display Line:   1 tab(s), Oral, daily, 90 tab(s), 0 Refill(s) ; Ordering Provider:   Justina Laws MD; Catalog Code:   escitalopram ; Order Dt/Tm:   4/13/2021 12:48:23 PM CDT          levothyroxine  :   levothyroxine ; Status:   Prescribed ; Ordered As Mnemonic:   levothyroxine 88 mcg (0.088 mg) oral tablet ; Simple Display Line:   1 tab(s), Oral, daily, INCREASE., 90 tab(s), 2 Refill(s) ; Ordering Provider:   Justina Laws MD; Catalog Code:   levothyroxine ; Order Dt/Tm:   12/23/2020 4:32:41 PM CST            Home Meds    docusate  :   docusate ; Status:   Documented ; Ordered As Mnemonic:   Colace 50 mg oral capsule ; Simple Display Line:   50 mg, 1 cap(s), Oral, bid, 0 Refill(s) ; Catalog Code:   docusate ; Order Dt/Tm:   6/23/2021 1:39:09 PM CDT          calcium-vitamin D  :   calcium-vitamin D ; Status:   Documented ; Ordered As Mnemonic:   Calcium 600+D ; Simple Display Line:   2 tab(s), po, daily ; Catalog Code:   calcium-vitamin D ; Order Dt/Tm:   8/4/2014 9:31:21 AM CDT            Social History   Social History   (As Of: 9/9/2021 9:48:26 AM CDT)   Alcohol:        1 x per month, 1 drinks/episode average.  2 drinks/episode maximum.   (Last Updated: 3/13/2017 3:39:56 PM CDT by Yulisa Ray LPN)          Tobacco:  Past      Former smoker, quit more than 30 days ago   (Last Updated: 12/29/2020 10:42:34 AM CST by Dary Pro CMA)          Electronic Cigarette/Vaping:  Denies Electronic Cigarette Use      Electronic Cigarette Use: Never.   (Last Updated: 12/29/2020 10:43:04 AM CST by Dary Pro CMA)          Home/Environment:        Marital status:  x 2.   (Last Updated: 9/18/2014 9:11:25 AM CDT by Nilay  Rama)          Nutrition/Health:        Type of diet: Regular.   (Last Updated: 10/17/2013 11:40:58 AM CDT by Yulisa Ray LPN)          Exercise:  Regular exercise      (Last Updated: 10/17/2013 11:40:49 AM CDT by Yulisa Ray LPN )

## 2022-02-16 NOTE — TELEPHONE ENCOUNTER
---------------------  From: Rosi Zimmerman LPN (Phone Messages Pool ([id number]_[facility]))   Sent: 1/26/2021 12:25:53 PM CST  Subject: levothyroxine     Phone Message    PCP:   CHILANGO      Time of Call:  12:03pm       Person Calling:  pt  Phone number:  [phone]    Returned call at: 12:23pm    Note:   Pt LM stating she needs a refill of levothyroxine. Pt says she does not want to get poked again.    Pt had TSH drawn 9/28/20  Rx sent 12/23/20 #90 with 2 refills.    Returned call and advised pt contact her pharmacy.    Last office visit and reason:  1/21/21 abdominal pain

## 2022-02-16 NOTE — PROGRESS NOTES
Patient:   MARLEN ARDON            MRN: [id number]            FIN: [id number]               Age:   51 years     Sex:  Female     :  1970   Associated Diagnoses:   Post-COVID-19 condition   Author:   Jurgen Potts PA-C      Visit Information      Date of Service: 2021 09:35 am  Performing Location: [facility]  Encounter#: [id number]      Primary Care Provider (PCP):  Justina Laws MD    NPI# [id number]      Referring Provider:  Jurgen Potts PA-C    NPI# [id number]      Chief Complaint   2021 9:44 AM CST   f/u on lung issues and discuss return to work, feels like sinuses are improving.        History of Present Illness             The patient presents with cough.  The cough is described as hacking.  The severity of the cough is mild.  The cough fluctuates in intensity.  The cough has lasted for 5 week(s).  Covid infection end of Sept. Continues with cough and mild STOVER, although improved. Able to do stairs with laundry with less effort. Left shoulder pain. Needs updated work note. Wants to try five hours per day. Sinus symptoms have improved..  Using Flovent and albuterol with minimal improvement..  Associated symptoms consist of sore throat and denies fever.        Review of Systems   Constitutional:  Negative except as documented in history of present illness.    Eye:  Negative.    Ear/Nose/Mouth/Throat:  Negative except as documented in history of present illness.    Respiratory:  Negative except as documented in history of present illness.    Cardiovascular:  Negative except as documented in history of present illness.    Musculoskeletal:  Negative except as documented in history of present illness.       Health Status   Allergies:    Allergic Reactions (Selected)  Severity Not Documented  Doxycycline (Ill)  Naproxen (No reactions were documented)  Sulfa drug (Hives)   Medications:  (Selected)   Prescriptions  Prescribed  Albuterol (Eqv-ProAir HFA) 90 mcg/inh  inhalation aerosol: 2 puff(s), Inhale, q6 hrs, # 6.7 gm, 0 Refill(s), Type: Maintenance, Pharmacy: [facility], 2 puff(s) Inhale q6 hrs, 62.5, in, 10/11/21 16:26:00 CDT, Height Measured, 139.5, lb, 10/11/21 16:26:00 CDT, Weight Measured  Flovent  mcg/inh inhalation aerosol: ( 220 mcg ), Inhale, bid, # 1 EA, 0 Refill(s), Type: Maintenance, Pharmacy: [facility], 220 mcg Inhale bid, 62.5, in, 10/27/21 13:14:00 CDT, Height Measured, 140.7, lb, 10/27/21 13:14:00 CDT, Weight Measured  amoxicillin-clavulanate 875 mg-125 mg oral tablet: = 1 tab(s), Oral, q12 hrs, x 10 day(s), # 20 tab(s), 0 Refill(s), Type: Acute, Pharmacy: [facility], 1 tab(s) Oral q12 hrs,x10 day(s), 62.5, in, 11/16/21 10:00:00 CST, Height Measured, 139.8, lb, 11/01/21 13:35:00 CDT, Weight Measured  levothyroxine 88 mcg (0.088 mg) oral tablet: = 1 tab(s), Oral, daily, Instructions: INCREASE., # 90 tab(s), 2 Refill(s), Type: Maintenance, Pharmacy: [facility], TAKE 1 TABLET BY MOUTH ONCE DAILY ( INCREASE ), 62.5, in, 10/07/20 11:31:00 CDT, Height Measured, 149, lb, 10/07/20 11:31:00 CDT,...  Documented Medications  Documented  Calcium 600+D: 2 tab(s), po, daily, 0 Refill(s), Type: Maintenance  Colace 50 mg oral capsule: = 1 cap(s) ( 50 mg ), Oral, bid, 0 Refill(s), Type: Maintenance,    Medications          *denotes recorded medication          Albuterol (Eqv-ProAir HFA) 90 mcg/inh inhalation aerosol: 2 puff(s), Inhale, q6 hrs, 6.7 gm, 0 Refill(s).          amoxicillin-clavulanate 875 mg-125 mg oral tablet: 1 tab(s), Oral, q12 hrs, for 10 day(s), 20 tab(s), 0 Refill(s).          *Calcium 600+D: 2 tab(s), po, daily.          *Colace 50 mg oral capsule: 50 mg, 1 cap(s), Oral, bid, 0 Refill(s).          Flovent  mcg/inh inhalation aerosol: 220 mcg, Inhale, bid, 1 EA, 0 Refill(s).          levothyroxine 88 mcg (0.088 mg) oral tablet: 1 tab(s), Oral, daily, INCREASE., 90 tab(s), 2 Refill(s).       Problem list:    All  Problems  Unspecified abdominal pain / SNOMED CT 01251495 / Confirmed  Ovarian cyst / SNOMED CT [id number] / Confirmed  Osteopenia / SNOMED CT 230440223 / Confirmed  Chronic insomnia / SNOMED CT 425130251 / Confirmed  Anxiety Disorder / SNOMED CT [id number] / Confirmed  Acquired hypothyroidism / SNOMED CT 542209611 / Confirmed      Histories   Past Medical History:    Active  Ovarian cyst ([id number])  Comments:  10/17/2013 CDT 11:40 AM [facility] - Yulisa Ray LPN  Right ovary  Osteopenia (165999253)  Chronic insomnia (880916667)  Anxiety Disorder ([id number])  Acquired hypothyroidism (204079661)  Unspecified abdominal pain (53290235)   Family History:    MI  Father  Diabetes mellitus  Mother  Hypertension  Father     Procedure history:    Colonoscopy (160779401) on 3/26/2021 at 50 Years.  Comments:  4/15/2021 7:59 AM CDT - Rama Pemberton  Indication: Left lower quadrant abdominal pain.   Sedation: Versed, Fentanyl.  Findings: Normal with the exception of severe diverticulosis in descending and sigmoid colon.  Hysterectomy and unilateral salpingo-oophorectomy sample (821424021) on 6/2/2005 at 34 Years.  Comments:  10/17/2013 11:39 AM [facility] - Yulisa Ray LPN  L ov elisabet removed.   Social History:        Electronic Cigarette/Vaping Assessment: Denies Electronic Cigarette Use            Electronic Cigarette Use: Never.      Alcohol Assessment            1 x per month, 1 drinks/episode average.  2 drinks/episode maximum.      Tobacco Assessment: Past            Former smoker, quit more than 30 days ago      Home and Environment Assessment            Marital status:  x 2.      Nutrition and Health Assessment            Type of diet: Regular.      Exercise and Physical Activity Assessment: Regular exercise        Physical Examination   Vital Signs   11/19/2021 9:44 AM CST Temperature Tympanic 97.5 DegF  LOW     Peripheral Pulse Rate 76 bpm    Pulse Site Radial artery    HR Method Manual    Systolic Blood Pressure 114 mmHg    Diastolic Blood Pressure 72 mmHg    Mean Arterial Pressure 86 mmHg    BP Site Right arm    BP Method Manual    Oxygen Saturation 99 %      Measurements from flowsheet : Measurements   11/19/2021 9:44 AM CST Height Measured - Standard 62.5 in    Weight Measured - Standard 139 lb    BSA 1.67 m2    Body Mass Index 25.02 kg/m2  HI      HENT:       Throat: Pharynx ( Erythematous, lymphoid hyperplasia ).    Neck:  Supple, Non-tender, No lymphadenopathy.    Respiratory:  Lungs are clear to auscultation, Respirations are non-labored.    Cardiovascular:  Normal rate, Regular rhythm.    Musculoskeletal:  Mild left sided lateral rib cage tenderness.    Psychiatric:  Cooperative, Appropriate mood & affect.       Impression and Plan   Diagnosis     Post-COVID-19 condition (JJL19-OV U09.9).     Patient Instructions:       Counseled: Patient, Regarding diagnosis, Regarding treatment, Regarding medications, Regarding activity.    Summary:  May try probiotic. Trial of increased time at work. FU PRN.

## 2022-02-16 NOTE — PROGRESS NOTES
Patient:   MARLEN ARDON            MRN: [id number]            FIN: [id number]               Age:   47 years     Sex:  Female     :  1970   Associated Diagnoses:   None   Author:   Talib Martinez MD       -   Today's date:    2017.        -   To whom it may concern:        This patient Was seen in my office on  2017.  .     Please excuse him/ her from work, for the next  1  weeks.  He/ she may return to work, on  7/3/2017, without restrictions.  Follow up as needed   Please contact me if you have any questions or concerns.      -   Sincerely,             Talib Martinez MD  [address]  [address] [phone]

## 2022-02-16 NOTE — NURSING NOTE
Urine Dipstick POC Entered On:  3/15/2019 9:22 AM CDT    Performed On:  3/15/2019 9:21 AM CDT by Schoenike , Andrea               Urine Dipstick POC   Urine Color Urine Dipstick :   Yellow   Urine Appearance Urine Dipstick :   Clear   Glucose Urine Dipstick :   Negative   Bilirubin Urine Dipstick :   Negative   Ketones Urine Dipstick :   Negative   Specific Gravity Urine Dipstick :   1.020   Blood Urine Dipstick :   Negative   pH Urine Dipstick :   8.5   Protein Urine Dipstick :   Negative   Urobilinogen Urine Dipstick :   0.2 mg/dl   Nitrite Urine Dipstick :   Negative   Leukocytes Urine Dipstick :   Negative   Schoenike , Andrea - 3/15/2019 9:21 AM CDT   Details   Collection Date :   3/15/2019 9:20 AM CDT   Handling Specimen POC :   Midstream   POC Test Comments :   Lab Test Preformed by:   [facility] Office  [address] [place] [address]  Phone: [phone]  Fax: [phone]     Schoenike , Andrea - 3/15/2019 9:21 AM CDT

## 2022-02-16 NOTE — TELEPHONE ENCOUNTER
---------------------  From: Nena Mott RN (Phone Messages Pool ([id number]_[facility]))   To: [facility] Message Pool ([address]);     Sent: 11/4/2021 10:36:16 AM CDT  Subject: work note       PCP:   CHILANGO       Time of Call:  10:32am       Person Calling:  pt  Phone number:  [phone]    Note:   Pt called, requesting a work note stating how many hours she can work per day. Stated she has been working with [employer] with work related injury.    Please assist with work note.    Last office visit and reason:  11/1/21 cough [facility]---------------------  From: Geeta Lee CMA ([employer] Message Pool ([address]_[facility]))   To: Jurgen Potts PA-C;     Sent: 11/4/2021 10:40:44 AM CDT  Subject: FW: work note---------------------  From: Jurgen Potts PA-C   To: [facility] Message Pool ([address]_[facility]);     Sent: 11/4/2021 2:45:14 PM CDT  Subject: RE: work note     I called pt.  note in chart pt will come to  note    KAHPrinted and brought to

## 2022-02-16 NOTE — PROGRESS NOTES
Patient:   MARLEN ARDON            MRN: [id number]            FIN: [id number]               Age:   51 years     Sex:  Female     :  1970   Associated Diagnoses:   Strain of muscle(s) and tendon(s) of the rotator cuff of left shoulder, initial encounter; Acute cystitis   Author:   Justina Laws MD      Chief Complaint   left shoulder pain      History of Present Illness   patient with left shoulder pain  was walking the dog last week, pulled right arm which was up at 90 degrees when he pulled backward  felt a popping sensation  noting ongoing pain  decreased ROM    patient is s/p sigmoid resection, has been slowly recuperating, sees surgeon again , scheduled to go back to work    having regular BMs on colace  notes that she has had irritation in urination since discharge, did have hough catheter during hospitalization  will give urine sample prior to discharge      Health Status   Allergies:    Allergic Reactions (All)  Severity Not Documented  Doxycycline (Ill)  Naproxen (No reactions were documented)  Sulfa drug (Hives)   Medications:  (Selected)   Prescriptions  Prescribed  escitalopram 10 mg oral tablet: = 1 tab(s), Oral, daily, # 90 tab(s), 0 Refill(s), Type: Maintenance, Pharmacy: "[facility], 1 tab(s) Oral daily, 62.5, in, 21 12:01:00 CST, Height Measured, 142.9, lb, 21 12:01:00 CST, Weight Measured  levothyroxine 88 mcg (0.088 mg) oral tablet: = 1 tab(s), Oral, daily, Instructions: INCREASE., # 90 tab(s), 2 Refill(s), Type: Maintenance, Pharmacy: "[facility], TAKE 1 TABLET BY MOUTH ONCE DAILY ( INCREASE ), 62.5, in, 10/07/20 11:31:00 CDT, Height Measured, 149, lb, 10/07/20 11:31:00 CDT,...  Documented Medications  Documented  Calcium 600+D: 2 tab(s), po, daily, 0 Refill(s), Type: Maintenance  Colace 50 mg oral capsule: = 1 cap(s) ( 50 mg ), Oral, bid, 0 Refill(s), Type: Maintenance   Problem list:    All Problems  Acquired hypothyroidism / SNOMED CT 506723510 /  Confirmed  Anxiety Disorder / SNOMED CT 1BN60O47-263O-41Q3-[id number] / Confirmed  Chronic insomnia / SNOMED CT 746079978 / Confirmed  Osteopenia / SNOMED CT 730985108 / Confirmed  Ovarian cyst / SNOMED CT 88KP78Z6-UN87-4O7O-R88F-2GFC96W410AN / Confirmed  Right ovary  Unspecified abdominal pain / SNOMED CT 32135704 / Confirmed      Histories   Past Medical History:    Active  Ovarian cyst (SNOMED CT 48XO97R8-FL33-5X5T-U17T-3RKT43W033CQ)  Comments:  10/17/2013 CDT 11:40 AM CDT - Cory CHAYulisa  Right ovary  Osteopenia (SNOMED CT 380345427)  Chronic insomnia (SNOMED CT 570311259)  Anxiety Disorder (SNOMED CT 6FI62L15-864H-99C0-YG34-AIL71280H022)  Acquired hypothyroidism (SNOMED CT 335658957)  Unspecified abdominal pain (SNOMED CT 79397623)   Family History:    MI  Father  Diabetes mellitus  Mother  Hypertension  Father     Procedure history:    Colonoscopy ([id number]) on 3/26/2021 at 50 Years.  Comments:  4/15/2021 7:59 AM CDT - Rama Pemberton  Indication: Left lower quadrant abdominal pain.   Sedation: Versed, Fentanyl.  Findings: Normal with the exception of severe diverticulosis in descending and sigmoid colon.  Hysterectomy and unilateral salpingo-oophorectomy sample ([id number]) on 6/2/2005 at 34 Years.  Comments:  10/17/2013 11:39 AM CDT - Cory CHA Yulisa  L ov elisabet removed.      Physical Examination   Vital Signs   6/23/2021 1:37 PM CDT Temperature Tympanic 98.0 DegF    Peripheral Pulse Rate 104 bpm  HI    Pulse Site Radial artery    HR Method Electronic    Systolic Blood Pressure 120 mmHg    Diastolic Blood Pressure 80 mmHg    Mean Arterial Pressure 93 mmHg    BP Site Right arm    BP Method Manual    Oxygen Saturation 99 %      Measurements from flowsheet : Measurements   6/23/2021 1:37 PM CDT Height Measured - Standard 62.5 in    Weight Measured - Standard 139 lb    BSA 1.67 m2    Body Mass Index 25.02 kg/m2  HI      General:  Alert and oriented, No acute distress.    Eye:  Pupils are equal,  round and reactive to light, Normal conjunctiva.    Respiratory:  Lungs are clear to auscultation.    Cardiovascular:  Normal rate, Regular rhythm.    Gastrointestinal:  well healing incisions.    Musculoskeletal:  patient with pain with rotation at shoulder joint, unable to lift above 90 degrees due to pain.       Review / Management   Radiology results   X-ray, x-ray reviewed by me, no fracture or bony abnormality, normal x-ray, radiology will overread. Results discussed with patient. I will contact with any additional findings once read by radiologist.      Impression and Plan   Diagnosis     Strain of muscle(s) and tendon(s) of the rotator cuff of left shoulder, initial encounter (DUE94-PD S46.012A).     Plan:  patient should rest, ice, and slowly increase ROM with home exercises as demonstrated. Should see improvement over the next couple of weeks. If not improving should pursue physical therapy. Will follow up next week if not improving as may need restrictions if cleared to return to work.    Diagnosis     Acute cystitis (YFM37-AP N30.00).     Course:  may be related to bladder/urethra irritation from recent Monique use vs acute infection, will check UA and urine culture.

## 2022-02-16 NOTE — NURSING NOTE
"Comprehensive Intake Entered On:  10/7/2020 11:37 AM CDT    Performed On:  10/7/2020 11:31 AM CDT by Dary Pro CMA               Summary   Chief Complaint :   C/o goiter concerns-has had the goiter for \"awhile.\" Having difficulty talking/ breathing.    Menstrual Status :   Hysterectomy   Weight Measured :   149 lb(Converted to: 149 lb 0 oz, 67.585 kg)    Height Measured :   62.5 in(Converted to: 5 ft 2 in, 158.75 cm)    Body Mass Index :   26.82 kg/m2 (HI)    Body Surface Area :   1.72 m2   Systolic Blood Pressure :   120 mmHg   Diastolic Blood Pressure :   68 mmHg   Mean Arterial Pressure :   85 mmHg   Peripheral Pulse Rate :   88 bpm   BP Site :   Right arm   BP Method :   Manual   Temperature Tympanic :   98.2 DegF(Converted to: 36.8 DegC)    Oxygen Saturation :   98 %   Dary Pro CMA - 10/7/2020 11:31 AM CDT   Health Status   Allergies Verified? :   Yes   Medication History Verified? :   Yes   Immunizations Current :   Other: Declined TDAP.   Pre-Visit Planning Status :   Completed   Tobacco Use? :   Former smoker   Dary Pro CMA - 10/7/2020 11:31 AM CDT   Meds / Allergies   (As Of: 10/7/2020 11:37:13 AM CDT)   Allergies (Active)   doxycycline  Estimated Onset Date:   Unspecified ; Reactions:   Ill ; Created By:   Nena Edouard CMA; Reaction Status:   Active ; Category:   Drug ; Substance:   doxycycline ; Type:   Allergy ; Updated By:   Nena Edouard CMA; Reviewed Date:   8/25/2020 10:08 AM CDT      naproxen  Estimated Onset Date:   Unspecified ; Created By:   Yulisa Ray LPN; Reaction Status:   Active ; Category:   Drug ; Substance:   naproxen ; Type:   Allergy ; Updated By:   Yulisa Ray LPN; Reviewed Date:   8/25/2020 10:08 AM CDT      sulfa drug  Estimated Onset Date:   Unspecified ; Reactions:   Hives ; Created By:   Yulisa Ray LPN; Reaction Status:   Active ; Category:   Drug ; Substance:   sulfa drug ; Type:   Allergy ; Updated By:   Yulisa Ray LPN; Reviewed Date:   8/25/2020 " 10:08 AM CDT        Medication List   (As Of: 10/7/2020 11:37:13 AM CDT)   Prescription/Discharge Order    levothyroxine  :   levothyroxine ; Status:   Prescribed ; Ordered As Mnemonic:   levothyroxine 88 mcg (0.088 mg) oral tablet ; Simple Display Line:   88 mcg, 1 tab(s), PO, Daily, 90 tab(s), 1 Refill(s) ; Ordering Provider:   Justina Laws MD; Catalog Code:   levothyroxine ; Order Dt/Tm:   7/14/2020 11:38:00 AM CDT          Miscellaneous Rx Supply  :   Miscellaneous Rx Supply ; Status:   Prescribed ; Ordered As Mnemonic:   Magic mouthwash ; Simple Display Line:   10 ml QID PRN, Oral, qid, 1 Part viscous lidocaine 2%;  1 Part Maalox;  1 Part diphenhydramine 12.5 mg per 5 ml elixir; 120 ml total, 120 mL, 0 Refill(s) ; Ordering Provider:   Jurgen Potts PA-C; Catalog Code:   Miscellaneous Rx Supply ; Order Dt/Tm:   5/29/2020 11:20:46 AM CDT            Home Meds    calcium-vitamin D  :   calcium-vitamin D ; Status:   Documented ; Ordered As Mnemonic:   Calcium 600+D ; Simple Display Line:   2 tab(s), po, daily ; Catalog Code:   calcium-vitamin D ; Order Dt/Tm:   8/4/2014 9:31:21 AM CDT          Miscellaneous Prescription  :   Miscellaneous Prescription ; Status:   Documented ; Ordered As Mnemonic:   Dietary SUpplement-Acemannan ; Simple Display Line:   0 Refill(s) ; Catalog Code:   Miscellaneous Prescription ; Order Dt/Tm:   8/25/2020 10:00:56 AM CDT            ID Risk Screen   Recent Travel History :   No recent travel   Family Member Travel History :   No recent travel   Other Exposure to Infectious Disease :   Unknown   Dary Pro CMA - 10/7/2020 11:31 AM CDT

## 2022-02-16 NOTE — PROGRESS NOTES
Patient:   MARLEN ARDON            MRN: [id number]            FIN: [id number]               Age:   49 years     Sex:  Female     :  1970   Associated Diagnoses:   Sore throat   Author:   Jurgen Potts PA-C      Report Summary   Diagnosis  Sore throat (HKF81-IM J02.9).  Patient InstructionsOrders   Visit Information   Visit type:  General concerns.    Accompanied by:  No one.    Source of history:  Self.    Referral source:  Self.    History limitation:  None.       Chief Complaint   2020 11:08 AM CDT   Scratch in back of throat, painfull x 1 week, pain goes in to ear        History of Present Illness             The patient presents with oral problem(s).  The oral problem is described as scratchy.  The location of the oral problem is right, posterior.  The severity of the oral problem is mild.  The oral problem is episodic.  The oral problem has lasted for 1 week(s).  post pharynx feels irritated after eating chips. ? bone from meat. No problems swallowing. No fever. Referred pain to right ear. .  Associated symptoms consist of ear pain, denies fever, denies swollen lymph glands and denies taste disturbance.        Review of Systems   Constitutional:  Negative.    Ear/Nose/Mouth/Throat:  Negative except as documented in history of present illness.    Respiratory:  Negative.       Health Status   Allergies:    Allergic Reactions (Selected)  Severity Not Documented  Doxycycline (Ill)  Naproxen (No reactions were documented)  Sulfa drug (Hives)   Medications:  (Selected)   Prescriptions  Prescribed  Magic mouthwash: Magic mouthwash, 10 ml QID PRN, Oral, qid, Instructions: 1 Part viscous lidocaine 2%;  1 Part Maalox;  1 Part diphenhydramine 12.5 mg per 5 ml elixir; 120 ml total, Supply, # 120 mL, 0 Refill(s), Type: Maintenance, Pharmacy: [facility], 10 ml QID...  levothyroxine 75 mcg (0.075 mg) oral tablet: = 1 tab(s) ( 75 mcg ), po, daily, # 90 tab(s), 1 Refill(s), Type: Maintenance, Pharmacy:  [facility], to replace 88mcg levothyroxine, 1 tab(s) Oral daily  Documented Medications  Documented  Calcium 600+D: 2 tab(s), po, daily, 0 Refill(s), Type: Maintenance   Problem list:    All Problems  Ovarian cyst / SNOMED CT 01FU11R6-OF58-1H8K-P61Q-6JNN16K074MP / Confirmed  Osteopenia / SNOMED CT 607023234 / Confirmed  Chronic insomnia / SNOMED CT 612359993 / Confirmed  Anxiety Disorder / SNOMED CT 4EY52W44-656D-26R3-EL72-UFA90143G543 / Confirmed  Acquired hypothyroidism / SNOMED CT 548630741 / Confirmed      Histories   Past Medical History:    Active  Ovarian cyst (52AR30O1-[id number])  Comments:  10/17/2013 CDT 11:40 AM DANIELT - Yulisa Ray LPN  Right ovary   Procedure history:    Hysterectomy and unilateral salpingo-oophorectomy sample ([id number]) on 6/2/2005 at 34 Years.  Comments:  10/17/2013 11:39 AM CDT - Yulisa Ray LPN ov elisabet removed.      Physical Examination   Vital Signs   5/29/2020 11:08 AM CDT Peripheral Pulse Rate 80 bpm    Pulse Site Radial artery    HR Method Manual    Systolic Blood Pressure 118 mmHg    Diastolic Blood Pressure 62 mmHg    Mean Arterial Pressure 81 mmHg    BP Site Right arm    BP Method Manual      Measurements from flowsheet : Measurements   5/29/2020 11:08 AM CDT Height Measured - Standard 62.5 in    Weight Measured - Standard 154 lb    BSA 1.75 m2    Body Mass Index 27.72 kg/m2  HI      Eye:  Normal conjunctiva.    HENT:  Tympanic membranes are clear, Oral mucosa is moist, Mucosal irritation right anterior tonsillar pillar. No swelling. No lesion. No open area..    Neck:  Supple, Non-tender, No lymphadenopathy.    Respiratory:  Respirations are non-labored.    Cardiovascular:  Normal rate.       Impression and Plan   Diagnosis     Sore throat (KBS60-GR J02.9).     Patient Instructions:       Counseled: Patient, Regarding diagnosis, Diet, Verbalized understanding.    Orders     Orders (Selected)   Prescriptions  Prescribed  Magic mouthwash: Magic  mouthwash, 10 ml QID PRN, Oral, qid, Instructions: 1 Part viscous lidocaine 2%;  1 Part Maalox;  1 Part diphenhydramine 12.5 mg per 5 ml elixir; 120 ml total, Supply, # 120 mL, 0 Refill(s), Type: Maintenance, Pharmacy: [facility], 10 ml QID....     May use Tylenol PRN. Should be better in one week and not worse or will recheck.

## 2022-02-16 NOTE — NURSING NOTE
Comprehensive Intake Entered On:  10/11/2021 4:30 PM CDT    Performed On:  10/11/2021 4:26 PM CDT by Geeta Lee CMA               Summary   Chief Complaint :   f/u COVID, return to work - still fatigued and SOB   Menstrual Status :   Hysterectomy   Weight Measured :   139.5 lb(Converted to: 139 lb 8 oz, 63.276 kg)    Height Measured :   62.5 in(Converted to: 5 ft 2 in, 158.75 cm)    Body Mass Index :   25.11 kg/m2 (HI)    Body Surface Area :   1.67 m2   Systolic Blood Pressure :   127 mmHg   Diastolic Blood Pressure :   85 mmHg (HI)    Mean Arterial Pressure :   99 mmHg   Peripheral Pulse Rate :   69 bpm   BP Site :   Right arm   BP Method :   Electronic   HR Method :   Electronic   Oxygen Saturation :   99 %   Geeta Lee CMA - 10/11/2021 4:26 PM CDT   Health Status   Allergies Verified? :   Yes   Medication History Verified? :   Yes   Immunizations Current :   Other: Declined TDAP.   Medical History Verified? :   Yes   Tobacco Use? :   Former smoker   Geeta Lee CMA - 10/11/2021 4:26 PM CDT   Consents   Consent for Immunization Exchange :   Consent Granted   Consent for Immunizations to Providers :   Consent Granted   Geeta Lee CMA - 10/11/2021 4:26 PM CDT   Meds / Allergies   (As Of: 10/11/2021 4:30:29 PM CDT)   Allergies (Active)   doxycycline  Estimated Onset Date:   Unspecified ; Reactions:   Ill ; Created By:   Nena Edouard CMA; Reaction Status:   Active ; Category:   Drug ; Substance:   doxycycline ; Type:   Allergy ; Updated By:   Nena Edouard CMA; Reviewed Date:   10/11/2021 4:30 PM CDT      naproxen  Estimated Onset Date:   Unspecified ; Created By:   Yulisa Ray LPN; Reaction Status:   Active ; Category:   Drug ; Substance:   naproxen ; Type:   Allergy ; Updated By:   Yulisa Ray LPN; Reviewed Date:   10/11/2021 4:30 PM CDT      sulfa drug  Estimated Onset Date:   Unspecified ; Reactions:   Hives ; Created By:   Yulisa Ray LPN; Reaction Status:   Active ; Category:   Drug ;  Substance:   sulfa drug ; Type:   Allergy ; Updated By:   Yulisa Ray LPN; Reviewed Date:   10/11/2021 4:30 PM CDT        Medication List   (As Of: 10/11/2021 4:30:29 PM CDT)   Prescription/Discharge Order    levothyroxine  :   levothyroxine ; Status:   Prescribed ; Ordered As Mnemonic:   levothyroxine 88 mcg (0.088 mg) oral tablet ; Simple Display Line:   1 tab(s), Oral, daily, INCREASE., 90 tab(s), 2 Refill(s) ; [facility]; Catalog Code:   levothyroxine ; Order Dt/Tm:   12/23/2020 4:32:41 PM CST            Home Meds    calcium-vitamin D  :   calcium-vitamin D ; Status:   Documented ; Ordered As Mnemonic:   Calcium 600+D ; Simple Display Line:   2 tab(s), po, daily ; Catalog Code:   calcium-vitamin D ; Order Dt/Tm:   8/4/2014 9:31:21 AM CDT          docusate  :   docusate ; Status:   Documented ; Ordered As Mnemonic:   Colace 50 mg oral capsule ; Simple Display Line:   50 mg, 1 cap(s), Oral, bid, 0 Refill(s) ; Catalog Code:   docusate ; Order Dt/Tm:   6/23/2021 1:39:09 PM CDT            Social History   Social History   (As Of: 10/11/2021 4:30:29 PM CDT)   Alcohol:        1 x per month, 1 drinks/episode average.  2 drinks/episode maximum.   (Last Updated: 3/13/2017 3:39:56 PM CDT by Yulisa Ray LPN)          Tobacco:  Past      Former smoker, quit more than 30 days ago   (Last Updated: 12/29/2020 10:42:34 AM CST by Dary Pro CMA)          Electronic Cigarette/Vaping:  Denies Electronic Cigarette Use      Electronic Cigarette Use: Never.   (Last Updated: 12/29/2020 10:43:04 AM CST by Dary Pro CMA)          Home/Environment:        Marital status:  x 2.   (Last Updated: 9/18/2014 9:11:25 AM CDT by Rama Pemberton)          Nutrition/Health:        Type of diet: Regular.   (Last Updated: 10/17/2013 11:40:58 AM CDT by Yulisa Ray LPN)          Exercise:  Regular exercise      (Last Updated: 10/17/2013 11:40:49 AM CDT by Yulisa Ray LPN )

## 2022-02-16 NOTE — TELEPHONE ENCOUNTER
"---------------------  From: Deborah Johnson RN (Phone Messages Pool ([id number] - [place]))   To: [facility] in [employer] [facility] Pool [id number]     Sent: 11/30/2021 12:49:44 PM CST  Subject: Shoulder     Phone message    PCP:   CHILANGO      Time of Call:  1245       Person Calling:  Pt  Phone number:  [phone], [place] to [facility]    Note:   Pt has done 3 weeks of therapy and should is \"still not getting better\". Pt is wondering what the next step is. States PT is afraid to do any real strengthening because they are worried there is a tear maybe. I told pt to have PT fax visit notes and reports for RADHA to review.---------------------  From: Justina Laws MD ([employer] Message Pool ([id number]_[facility]))   To: Justina Laws MD;     Sent: 11/30/2021 12:51:15 PM CST  Subject: FW: Shoulder  ** Submitted: **  Order:MRI Shoulder w/o Contrast Left (Request)  Details:  Left shoulder pain         Signed by Justina Laws MD  12/1/2021 1:54:00 PM RUST        Has been going on since June when she had acute injury. Will order MRI---------------------  From: Justina Laws MD   To: [employer] in [employer] Message Pool ([id number]_[facility]);     Sent: 12/1/2021 7:55:33 AM CST  Subject: FW: ShoulderLVMTCB. Needing to know where she would like MRI done at  "

## 2022-02-16 NOTE — TELEPHONE ENCOUNTER
---------------------  From: Justina Laws MD   To: MARLEN ARDON    Sent: 7/14/2020 11:41:21 AM CDT  Subject: Patient Message - Results Notification     Marlen Keane,  Your labs are listed below. TSH is at upper limits. I would recommend that we increase your levothyroxine to 88mcg and then have you follow up in 3 months for labs. If that comes back down closer to 1 and the swelling in your neck improves, will continue on the 88mcg dose.  Let me know if you have questions.  Thanks,  Nicole Laws    Results:  Date Result Name Ind Value Ref Range   7/13/2020 11:12 AM Glucose Level  91 mg/dL (65 - 99)   7/13/2020 11:12 AM Cholesterol ((H)) 239 mg/dL ( - <200)   7/13/2020 11:12 AM Non-HDL Cholesterol ((H)) 158 ( - <130)   7/13/2020 11:12 AM HDL  81 mg/dL (> OR = 50 - )   7/13/2020 11:12 AM Cholesterol/HDL Ratio  3.0 ( - <5.0)   7/13/2020 11:12 AM LDL ((H)) 140    7/13/2020 11:12 AM Triglyceride  83 mg/dL ( - <150)   7/13/2020 11:12 AM TSH  3.93 mIU/L

## 2022-02-16 NOTE — PROGRESS NOTES
Chief Complaint    sore thoart; cough; hoarse voice; SOB x2 days  History of Present Illness      patient with onset yesterday of fatigue, congestion and sore throat      woke up this morning with hoarseness, worsening fatigue      notes cough, some shortness of breath with cough      no fevers      unable to work today      also discussed TSH/thyroid. Due for TSH later this month. Wants to return for that testing at that time as she has a hard time with blood draws and needs to prepare herself  Physical Exam   Vitals & Measurements    T: 98.0   F (Tympanic)  HR: 98(Peripheral)  BP: 104/60  SpO2: 97%     HT: 62.5 in  WT: 148.0 lb  BMI: 26.64       patient is alert and oriented, cooperative, in no distress      eyes: PERRL, EOM intact, normal conjunctiva      heent: normocephalic, TMs normal, canals patent, no sinus tenderness, oral mucosa moist, no oral lesions, normal hypopharynx      neck: supple, no lymphadenopathy, no thyromegaly      CV: RRR, no murmur, no edema, normal peripheral perfusion      lungs: clear and equal bilaterally, no rales, rhonchi, or wheezes   Assessment/Plan       1. Common cold (J00)       2. Laryngitis (J04.0)         reassurance that symptoms are consistent with viral URI, push fluids, rest, off work through Thursday, should be improved adequately by that time to return to work, note given           Patient Information     Name:MARLEN ARDON      Address:      [address] [place] [id number]     Sex:Female     YOB: 1970     [phone]     Emergency Contact:TEJAL NAVA     MRN:[id number]     FIN:[id number]     Location:[facility]     Date of Service:10/01/2019      Primary Care Physician:       Justina Laws MD, [phone]      Attending Physician:       Justina Laws MD, [phone]  Problem List/Past Medical History    Ongoing     Acquired hypothyroidism     Anxiety Disorder     Chronic insomnia      Osteopenia     Ovarian cyst       Comments: Right ovary    Historical     No qualifying data  Procedure/Surgical History     Hysterectomy and unilateral salpingo-oophorectomy sample (06/02/2005)            Comments: L ov elisabet removed..  Medications    Calcium 600+D, 2 tab(s), Oral, daily    clindamycin 2% vaginal cream, 1 anthony, Vaginal, hs, 1 refills    Diflucan 150 mg oral tablet, 150 mg= 1 tab(s), Oral, q72 hrs, PRN, 1 refills    levothyroxine 88 mcg (0.088 mg) oral tablet, 88 mcg= 1 tab(s), Oral, daily, 3 refills  Allergies    doxycycline (Ill)    naproxen    sulfa drug (Hives)  Social History    Smoking Status - 10/01/2019     Former smoker     Alcohol      1 x per month, 1 drinks/episode average. 2 drinks/episode maximum., 03/13/2017     Exercise - Regular exercise, 10/17/2013     Home/Environment      Marital status:  x 2., 09/18/2014     Nutrition/Health      Type of diet: Regular., 10/17/2013     Tobacco - Past, 10/17/2013      Past, 12/30/2014  Family History    Diabetes mellitus: Mother.    Hypertension: Father.    MI: Father.  Immunizations      Vaccine Date Status      human papillomavirus vaccine 09/04/2008 Recorded      human papillomavirus vaccine 04/17/2008 Recorded      human papillomavirus vaccine 09/22/2006 Recorded      Td 08/25/2004 Recorded  Lab Results       Lab Results (Last 4 results within 90 days)        TSH: 0.16 mIU/L Low (07/15/19 16:38:00)

## 2022-02-16 NOTE — TELEPHONE ENCOUNTER
---------------------  From: Justina Laws MD   To: MARLEN ARDON    Sent: 7/17/2019 9:16:47 AM CDT  Subject: TSH results     Marlen Keane,  Your thyroid level is too low. The new dose of medication is higher than you need. I sent in a prescription for the 88mcg tablets again. Let me know if you have questions.  Thanks,  Nicole Laws    Results:  Date Result Name Ind Value   7/15/2019 4:38 PM TSH ((L)) 0.16 mIU/L

## 2022-02-16 NOTE — PROGRESS NOTES
Patient:   MARLEN ARDON            MRN: [id number]            FIN: [id number]               Age:   51 years     Sex:  Female     :  1970   Associated Diagnoses:   None   Author:   Jurgen Potts PA-C       -   Today's date:  2021 12:12:00 PM .        -   To whom it may concern:        This patient is currently under my care and Was seen in my office on  2021 12:12:00 PM.  .     Please excuse him/ her from work, Until further notice. Being tested for Covid..      -   Best regards,

## 2022-02-16 NOTE — PROGRESS NOTES
Patient:   MARLEN ARDON            MRN: [id number]            FIN: [id number]               Age:   47 years     Sex:  Female     :  1970   Associated Diagnoses:   Acute maxillary sinusitis   Author:   Justina Laws MD      Chief Complaint   2018 3:31 PM CDT     c/o cough, chest congestion x 1 week      History of Present Illness   Patient with one week of cough, hacking, dry for the past week.   Chest is heavy.  Sinuses feel plugged, ears are painful, especially on left  No known fevers.       Health Status   Allergies:    Allergic Reactions (All)  Severity Not Documented  Doxycycline (Ill)  Naproxen (No reactions were documented)  Sulfa drug (Hives)   Medications:  (Selected)   Prescriptions  Prescribed  fexofenadine 180 mg oral tablet: 1 tab(s) ( 180 mg ), PO, Daily, PRN: as needed for allergy symptoms, # 30 tab(s), 5 Refill(s), Type: Maintenance, Pharmacy: [facility] #[id number], 1 tab(s) po daily,PRN:as needed for allergy symptoms  levothyroxine 88 mcg (0.088 mg) oral tablet: 1 tab(s) ( 88 mcg ), PO, Daily, # 90 tab(s), 3 Refill(s), Type: Maintenance, Pharmacy: [facility] #[id number], 1 tab(s) po daily  Documented Medications  Documented  Calcium 600+D: 2 tab(s), po, daily, 0 Refill(s), Type: Maintenance   Problem list:    All Problems  Acquired hypothyroidism / SNOMED CT 180441717 / Confirmed  Anxiety Disorder / SNOMED CT 5BW40U40-954G-42C2-ZI64-NEW85147K101 / Confirmed  Chronic insomnia / SNOMED CT 685457211 / Confirmed  Osteopenia / SNOMED CT 084528939 / Confirmed  Ovarian cyst / SNOMED CT 08BP21M3-EC75-8O6P-M50Y-1VJF20D042GC / Confirmed      Histories   Past Medical History:    Active  Ovarian cyst (SNOMED CT 65QG57J7-OI39-1U6X-K32N-6MLO47O295KB)  Comments:  10/17/2013 CDT 11:40 AM CDT - Yulisa Ray LPN  Right ovary   Family History:    MI  Father  Diabetes mellitus  Mother  Hypertension  Father     Procedure history:    Hysterectomy and unilateral salpingo-oophorectomy sample  ([id number]) on 6/2/2005 at 34 Years.  Comments:  10/17/2013 11:39 AM - Yulisa Ray LPN ov elisabet removed.   Social History:        Alcohol Assessment            1 x per month, 1 drinks/episode average.  2 drinks/episode maximum.      Tobacco Assessment: Past            Past                     Comments:                      12/30/2014 - Yulisa Ray LPN                     Quit 3 yrs ago.      Home and Environment Assessment            Marital status:  x 2.      Nutrition and Health Assessment            Type of diet: Regular.      Exercise and Physical Activity Assessment: Regular exercise        Physical Examination   Vital Signs   4/4/2018 3:31 PM CDT Temperature Tympanic 98 DegF    Peripheral Pulse Rate 98 bpm    Pulse Site Radial artery    HR Method Electronic    Systolic Blood Pressure 118 mmHg    Diastolic Blood Pressure 62 mmHg    Mean Arterial Pressure 81 mmHg    BP Site Left arm    BP Method Manual    Oxygen Saturation 97 %      Measurements from flowsheet : Measurements   4/4/2018 3:31 PM CDT Height Measured - Standard 62.5 in    Weight Measured - Standard 137.8 lb    BSA 1.66 m2    Body Mass Index 24.8 kg/m2      General:  Alert and oriented, No acute distress.    Eye:  Pupils are equal, round and reactive to light, Normal conjunctiva.    HENT:  Normocephalic, Tympanic membranes are clear, Oral mucosa is moist, No pharyngeal erythema.         Sinus: Bilateral, Maxillary sinus, Tenderness.    Neck:  Supple, Non-tender.    Respiratory:  Lungs are clear to auscultation.    Cardiovascular:  Normal rate, Regular rhythm.       Impression and Plan   Diagnosis     Acute maxillary sinusitis (KLV26-VE J01.00).     Course:  Worsening.    Orders     Orders (Selected)   Prescriptions  Prescribed  Amoxil 875 mg oral tablet: 1 tab(s) ( 875 mg ), PO, BID, # 20 tab(s), 0 Refill(s), Type: Maintenance, Pharmacy: [facility] #[id number], 1 tab(s) po bid,x10 day(s)  Diflucan 150 mg oral tablet: 1 tab(s) ( 150 mg  ), PO, q72hr, PRN: itching, # 2 tab(s), 1 Refill(s), Type: Maintenance, Pharmacy: [facility] #[id number], 1 tab(s) po q72 hrs,PRN:itching.

## 2022-02-16 NOTE — PROGRESS NOTES
Patient:   MARLEN ARDON            MRN: [id number]            FIN: [id number]               Age:   48 years     Sex:  Female     :  1970   Associated Diagnoses:   Right foot pain; Left knee sprain; Left ankle sprain; Acquired hypothyroidism   Author:   Justina Laws MD      Chief Complaint   2018 2:06 PM CDT     Pulled multiple muscles; neck, legs, feet, back; TSH med check, feeling more tired.      History of Present Illness   patient with two injuries past 24 hours - yesterday slipped climbing into a vehicle, noticed mild soreness, then today was standing at the sink, turned to walk and elderly dog was in the way, fell trying to get around her, Chief complaint and symptoms reviewed with patient and confirmed as above.  also check TSH, increased fatigue      Health Status   Allergies:    Allergic Reactions (All)  Severity Not Documented  Doxycycline (Ill)  Naproxen (No reactions were documented)  Sulfa drug (Hives)   Medications:  (Selected)   Prescriptions  Prescribed  levothyroxine 88 mcg (0.088 mg) oral tablet: 1 tab(s) ( 88 mcg ), PO, Daily, # 90 tab(s), 3 Refill(s), Type: Maintenance, Pharmacy: [id number] #[id number], 1 tab(s) po daily  Documented Medications  Documented  Calcium 600+D: 2 tab(s), po, daily, 0 Refill(s), Type: Maintenance,    Medications          *denotes recorded medication          *Calcium 600+D: 2 tab(s), po, daily.          levothyroxine 88 mcg (0.088 mg) oral tablet: 88 mcg, 1 tab(s), PO, Daily, 90 tab(s), 3 Refill(s).     Problem list:    All Problems  Anxiety Disorder / SNOMED CT 2HF51I01-201M-12U4-VA12-BCN72258C216 / Confirmed  Acquired hypothyroidism / SNOMED CT 751484692 / Confirmed  Chronic insomnia / SNOMED CT 266038937 / Confirmed  Osteopenia / SNOMED CT 796797785 / Confirmed  Ovarian cyst / SNOMED CT 39NK44P0-QN94-8V5A-U54Z-5WGS97L548TG / Confirmed      Histories   Past Medical History:    Active  Ovarian cyst (SNOMED CT  30DQ24Y8-TD53-9U1Q-A43U-8SGA07R438MD)  Comments:  10/17/2013 CDT 11:40 AM CDT - Yulisa aRy LPN  Right ovary   Family History:    MI  Father  Diabetes mellitus  Mother  Hypertension  Father     Procedure history:    Hysterectomy and unilateral salpingo-oophorectomy sample ([id number]) on 6/2/2005 at 34 Years.  Comments:  10/17/2013 11:39 AM - Yulisa Ray LPN  L ov elisabet removed.   Social History:        Alcohol Assessment            1 x per month, 1 drinks/episode average.  2 drinks/episode maximum.      Tobacco Assessment: Past            Past                     Comments:                      12/30/2014 - Yulisa Ray LPN                     Quit 3 yrs ago.      Home and Environment Assessment            Marital status:  x 2.      Nutrition and Health Assessment            Type of diet: Regular.      Exercise and Physical Activity Assessment: Regular exercise        Physical Examination   Vital Signs   9/6/2018 2:06 PM CDT Temperature Tympanic 98.5 DegF    Peripheral Pulse Rate 88 bpm    Pulse Site Radial artery    HR Method Manual    Systolic Blood Pressure 120 mmHg    Diastolic Blood Pressure 80 mmHg    Mean Arterial Pressure 93 mmHg    BP Site Left arm    BP Method Manual      Measurements from flowsheet : Measurements   9/6/2018 2:06 PM CDT Height Measured - Standard 62.5 in    Weight Measured - Standard 145.6 lb    BSA 1.7 m2    Body Mass Index 26.2 kg/m2  HI      General:  Alert and oriented, Mild distress.    Musculoskeletal:  pain in left knee, no swelling, no bruising, no joint laxiety; left ankle with lateral tenderness, minimal swelling, difficulty bearing full weight; right foot with pain on heel, no swelling or bruising.       Review / Management   xr right foot and left ankle both normal         Impression and Plan   Diagnosis     Right foot pain (ZHC96-ZT M79.671).     Left knee sprain (XWD60-QI S83.92XA).     Left ankle sprain (OWX43-FB S93.402A).     Plan:  off work through the  weekend, ice, elevate, rest; follow up next week if not getting better;  xr results discussed with patient. I will contact with any additional findings once read by radiologist..    Diagnosis     Acquired hypothyroidism (WZR70-RE E03.4).     Orders     TSH today.

## 2022-02-16 NOTE — NURSING NOTE
Comprehensive Intake Entered On:  8/12/2021 12:42 PM CDT    Performed On:  8/12/2021 12:34 PM CDT by Melisa Montalvo CMA               Summary   Chief Complaint :   c/o headache does have hx of migraines, cough with post nasal drainage, itchy eyes since yesterday poss allergies   Menstrual Status :   Hysterectomy   Weight Measured :   140.5 lb(Converted to: 140 lb 8 oz, 63.730 kg)    Height Measured :   62.5 in(Converted to: 5 ft 2 in, 158.75 cm)    Body Mass Index :   25.29 kg/m2 (HI)    Body Surface Area :   1.67 m2   Systolic Blood Pressure :   120 mmHg   Diastolic Blood Pressure :   70 mmHg   Mean Arterial Pressure :   87 mmHg   Peripheral Pulse Rate :   93 bpm   BP Site :   Right arm   Pulse Site :   Radial artery   BP Method :   Manual   HR Method :   Electronic   Temperature Tympanic :   98 DegF(Converted to: 36.7 DegC)    Oxygen Saturation :   98 %   Melisa Montalvo CMA - 8/12/2021 12:34 PM CDT   Health Status   Allergies Verified? :   Yes   Medication History Verified? :   Yes   Immunizations Current :   Other: Declined TDAP.   Medical History Verified? :   No   Pre-Visit Planning Status :   Not completed   Tobacco Use? :   Former smoker   Melisa Montalvo CMA - 8/12/2021 12:34 PM CDT   Consents   Consent for Immunization Exchange :   Consent Granted   Consent for Immunizations to Providers :   Consent Granted   Melisa Montalvo CMA - 8/12/2021 12:34 PM CDT   Meds / Allergies   (As Of: 8/12/2021 12:42:14 PM CDT)   Allergies (Active)   doxycycline  Estimated Onset Date:   Unspecified ; Reactions:   Ill ; Created By:   Nena Edouard CMA; Reaction Status:   Active ; Category:   Drug ; Substance:   doxycycline ; Type:   Allergy ; Updated By:   Nena Edouard CMA; Reviewed Date:   8/12/2021 12:39 PM CDT      naproxen  Estimated Onset Date:   Unspecified ; Created By:   Yulisa Ray LPN; Reaction Status:   Active ; Category:   Drug ; Substance:   naproxen ; Type:   Allergy ; Updated By:   Yulisa Ray LPN; Reviewed  Date:   8/12/2021 12:39 PM CDT      sulfa drug  Estimated Onset Date:   Unspecified ; Reactions:   Hives ; Created By:   Yulisa Ray LPN; Reaction Status:   Active ; Category:   Drug ; Substance:   sulfa drug ; Type:   Allergy ; Updated By:   Yulisa Ray LPN; Reviewed Date:   8/12/2021 12:39 PM CDT        Medication List   (As Of: 8/12/2021 12:42:14 PM CDT)   Prescription/Discharge Order    escitalopram  :   escitalopram ; Status:   Prescribed ; Ordered As Mnemonic:   escitalopram 10 mg oral tablet ; Simple Display Line:   1 tab(s), Oral, daily, 90 tab(s), 0 Refill(s) ; Ordering Provider:   Justina Laws MD; Catalog Code:   escitalopram ; Order Dt/Tm:   4/13/2021 12:48:23 PM CDT          levothyroxine  :   levothyroxine ; Status:   Prescribed ; Ordered As Mnemonic:   levothyroxine 88 mcg (0.088 mg) oral tablet ; Simple Display Line:   1 tab(s), Oral, daily, INCREASE., 90 tab(s), 2 Refill(s) ; Ordering Provider:   Justina Laws MD; Catalog Code:   levothyroxine ; Order Dt/Tm:   12/23/2020 4:32:41 PM [facility]            Home Meds    calcium-vitamin D  :   calcium-vitamin D ; Status:   Documented ; Ordered As Mnemonic:   Calcium 600+D ; Simple Display Line:   2 tab(s), po, daily ; Catalog Code:   calcium-vitamin D ; Order Dt/Tm:   8/4/2014 9:31:21 AM CDT          docusate  :   docusate ; Status:   Documented ; Ordered As Mnemonic:   Colace 50 mg oral capsule ; Simple Display Line:   50 mg, 1 cap(s), Oral, bid, 0 Refill(s) ; Catalog Code:   docusate ; Order Dt/Tm:   6/23/2021 1:39:09 PM CDT            Social History   Social History   (As Of: 8/12/2021 12:42:14 PM CDT)   Alcohol:        1 x per month, 1 drinks/episode average.  2 drinks/episode maximum.   (Last Updated: 3/13/2017 3:39:56 PM CDT by Yulisa Ray LPN)          Tobacco:  Past      Former smoker, quit more than 30 days ago   (Last Updated: 12/29/2020 10:42:34 AM CST by Dary Pro CMA)          Electronic Cigarette/Vaping:  Denies Electronic  Cigarette Use      Electronic Cigarette Use: Never.   (Last Updated: 12/29/2020 10:43:04 AM CST by Dary Pro CMA)          Home/Environment:        Marital status:  x 2.   (Last Updated: 9/18/2014 9:11:25 AM CDT by Rama Pemberton)          Nutrition/Health:        Type of diet: Regular.   (Last Updated: 10/17/2013 11:40:58 AM CDT by Yulisa Ray LPN)          Exercise:  Regular exercise      (Last Updated: 10/17/2013 11:40:49 AM CDT by Yulisa Ray LPN )

## 2022-02-16 NOTE — TELEPHONE ENCOUNTER
---------------------  From: Erasmo CHARosi (Phone Messages Pool ([id number]))   To: [facility] Message Pool ([id number]);     Sent: 9/30/2020 9:00:47 AM CDT  Subject: stomach pain     Phone Message    PCP:   asked or OTF      Time of Call:  8:32am       Person Calling:  pt  Phone number:  [phone]    Returned call at: 8:48    Note:   Pt LM wondering how long she should wait while on the antibiotic with the stomach pain.    Per BAM note pt was being treated for diverticulitis with Augmentin. Pt needed to consider CT scan if not improving in next 48-72 hours.    Returned call and pt says she was just able to start the antibiotics yesterday. Pt says when she is laying down/sleeping she will wake up in pain and have to go to the bathroom right away.    Pt wondering if BAM wants her to stay on the antibiotics a little longer before doing the CT scan to see if they help.    Pt is tolerating clear liquids/soft food. Has been sticking with jello, juice, mashed potatoes, and pudding    Pt says OTF has her going back to work tomorrow but pt says she will not be able to work if she still has the pain in her stomach.    Pt informed BAM in clinic this afternoon and message will be forwarded.    Last office visit and reason:  9/28/20 abdominal pain/thyroid---------------------  From: Alessandra Martínez ([facility] Message Pool ([id number]))   To: Rakel Canseco PA-C;     Sent: 9/30/2020 10:06:27 AM CDT  Subject: FW: stomach pain---------------------  From: Rakel Canseco PA-C   To: [employer] Message Pool ([id number]);     Sent: 9/30/2020 10:20:04 AM CDT  Subject: RE: stomach pain     I would like her to be on abx 48-72 hours to see if sx improved before CT as long as she has no fever and tolerating fluids, not severe pain.        so if she wants to touch base with us Friday am if she is not improving that would be fine.  She can have a note for time off work.  Through the weekend  would be fine.Pt was notified with lab results and to continue her abx for another 48-72 hours. Will f/u if not improving after that. Put a note at the front test per pt request.

## 2022-02-16 NOTE — PROGRESS NOTES
Patient:   MARLEN ARDON            MRN: [id number]            FIN: [id number]               Age:   51 years     Sex:  Female     :  1970   Associated Diagnoses:   Sore throat; Contact with and (suspected) exposure to other viral communicable diseases; Fever; Cough; Fatigue   Author:   Jurgen Potts PA-C      Visit Information      Date of Service: 2021 11:57 am  Performing Location: [facility]  Encounter#: [id number]      Primary Care Provider (PCP):  Justina Laws MD    NPI# [id number]      Referring Provider:  Jurgen Potts PA-C    NPI# [id number]   Visit type:  Telephone Encounter.    Source of history:  Patient.    Location of patient:  Home  Call Start Time:   1203  Call End Time:    1208      Chief Complaint   Cough, fever   _      History of Present Illness   Today's visit was conducted via telephone due to the COVID-19 pandemic. Patient's consent to telephone visit was obtained and documented.      Reason for visit:  Cough, fever, congestion, sore throat. Fever up to 103. Pushing fluids. Hasn't taken any antipyretics. No known Covid exposure. Has not been vaccinated. Mild SOB.      Review of Systems   Constitutional:  Fever, Chills.    Ear/Nose/Mouth/Throat:  Nasal congestion, Sore throat.    Respiratory:  Cough.    Neurologic:  Headache.       Impression and Plan   Diagnosis     Sore throat (ADC95-BC J02.9).     Contact with and (suspected) exposure to other viral communicable diseases (CNN34-CN Z20.828).     Fever (ANY75-LR R50.9).     Cough (KFX78-BW R05).     Fatigue (SNL78-JE R53.83).     Patient Instructions:       Counseled: Patient, Regarding treatment, Regarding medications, Activity, Verbalized understanding.    Summary:  Patient should remain isolated until results of test return and given that tests are not 100% accurate, would be safest to assume that they are contagious with COVID-19 until their symptoms have fully resolved. Isolation is recommended for  at least 7 days from the onset of symptoms and for 3 days after resolution of fevers and productive cough. This means patient should not go to work or any public areas. In addition, it is recommended at home that they separate themselves from other people and from animals as much as possible, including using a separate bathroom. If they do need to be around others, a facemask is recommended. Frequent hand hygiene and cleaning of high touch surfaces is also recommended.   Symptoms can last for several weeks. For patients with COVID-19, they can sometimes start to improve and then get worse again. If symptoms worsen at any time, including significant shortness of breath, low oxygen levels, high fevers that cannot be controlled, or concerns for dehydration, they should seek medical care. If going to the ER, calling 911, or seeking care at the clinic, they are reminded to notify staff that they have been tested for COVID-19.  Patient also is informed that testing will be done in their car at a scheduled time. Test will be sent to an outside commercial lab and billed by that lab. [employer] cannot confirm to patient how billing will be handled by their insurance company.    Patient is also informed that testing for COVID-19 must be reported to the public health department along with contact information for the patient.  .       Health Status   Allergies:    Allergic Reactions (Selected)  Severity Not Documented  Doxycycline (Ill)  Naproxen (No reactions were documented)  Sulfa drug (Hives)   Medications:  (Selected)   Prescriptions  Prescribed  levothyroxine 88 mcg (0.088 mg) oral tablet: = 1 tab(s), Oral, daily, Instructions: INCREASE., # 90 tab(s), 2 Refill(s), Type: Maintenance, Pharmacy: [facility], TAKE 1 TABLET BY MOUTH ONCE DAILY ( INCREASE ), 62.5, in, 10/07/20 11:31:00 CDT, Height Measured, 149, lb, 10/07/20 11:31:00 CDT,...  Documented Medications  Documented  Calcium 600+D: 2 tab(s), po, daily, 0  Refill(s), Type: Maintenance  Colace 50 mg oral capsule: = 1 cap(s) ( 50 mg ), Oral, bid, 0 Refill(s), Type: Maintenance   Problem list:    All Problems  Unspecified abdominal pain / SNOMED CT 54416964 / Confirmed  Ovarian cyst / SNOMED CT 11PF16K3-ZO50-8R7N-H50T-8GTL46I420EX / Confirmed  Osteopenia / SNOMED CT 019073444 / Confirmed  Chronic insomnia / SNOMED CT 138339858 / Confirmed  Anxiety Disorder / SNOMED CT 6UG36K20-760Y-98M7-DE03-HSB17894L308 / Confirmed  Acquired hypothyroidism / SNOMED CT 003569872 / Confirmed      Histories   Past Medical History:    Active  Ovarian cyst (94YB16C7-AZ71-6K6P-P83F-6SIQ12V459EK)  Comments:  10/17/2013 CDT 11:40 AM DANIELT - Yulisa Ray LPN  Right ovary  Osteopenia (408573969)  Chronic insomnia (287408884)  Anxiety Disorder (9VB66L23-592V-08K2-BT53-FME50293U348)  Acquired hypothyroidism (262546816)  Unspecified abdominal pain (62947253)   Family History:    MI  Father  Diabetes mellitus  Mother  Hypertension  Father     Procedure history:    Colonoscopy (269268150) on 3/26/2021 at 50 Years.  Comments:  4/15/2021 7:59 AM CDT - Rama Pemberton  Indication: Left lower quadrant abdominal pain.   Sedation: Versed, Fentanyl.  Findings: Normal with the exception of severe diverticulosis in descending and sigmoid colon.  Hysterectomy and unilateral salpingo-oophorectomy sample (587095734) on 6/2/2005 at 34 Years.  Comments:  10/17/2013 11:39 AM CDT - Yulisa Ray LPN ov elisabet removed.   Social History:        Electronic Cigarette/Vaping Assessment: Denies Electronic Cigarette Use            Electronic Cigarette Use: Never.      Alcohol Assessment            1 x per month, 1 drinks/episode average.  2 drinks/episode maximum.      Tobacco Assessment: Past            Former smoker, quit more than 30 days ago      Home and Environment Assessment            Marital status:  x 2.      Nutrition and Health Assessment            Type of diet: Regular.      Exercise and Physical  Activity Assessment: Regular exercise        Health Maintenance      Recommendations     Pending (in the next year)        OverDue           Breast Cancer Screen due  03/23/19  and every 2  year(s)        Due            Aspirin Therapy for Prevention of CVD and CRC due  09/29/21  and every 5  year(s)        Near Due            Alcohol Misuse Screen near due  10/07/21  and every 1  year(s)           Depression Screen near due  10/07/21  and every 1  year(s)        Refused            Tetanus Vaccine due  08/25/14  and every 10  year(s)           Influenza Vaccine due  09/01/21  and every 1  year(s)        Due In Future            Body Mass Index Check not due until  08/12/22  and every 1  year(s)           High Blood Pressure Screen not due until  09/09/22  and every 1  year(s)     Satisfied (in the past 1 year)        Satisfied            Alcohol Misuse Screen on  10/07/20.           Alcohol Misuse Screen on  10/07/20.           Body Mass Index Check on  08/12/21.           Body Mass Index Check on  07/13/21.           Body Mass Index Check on  06/23/21.           Body Mass Index Check on  03/02/21.           Body Mass Index Check on  02/03/21.           Body Mass Index Check on  01/21/21.           Body Mass Index Check on  12/29/20.           Body Mass Index Check on  10/07/20.           Colorectal Cancer Screen (Colonoscopy) on  05/19/21.           Colorectal Cancer Screen (Occult Blood) on  05/19/21.           Colorectal Cancer Screen (Sigmoidoscopy) on  05/19/21.           Colorectal Cancer Screen (Colonoscopy) on  03/26/21.           Colorectal Cancer Screen (Colonoscopy) on  03/26/21.           Colorectal Cancer Screen (Occult Blood) on  03/26/21.           Colorectal Cancer Screen (Sigmoidoscopy) on  03/26/21.           Colorectal Cancer Screen (Sigmoidoscopy) on  03/26/21.           Depression Screen on  10/07/20.           Depression Screen on  10/07/20.           Depression Screen on  10/07/20.            High Blood Pressure Screen on  09/09/21.           High Blood Pressure Screen on  08/12/21.           High Blood Pressure Screen on  07/13/21.           High Blood Pressure Screen on  06/23/21.           High Blood Pressure Screen on  03/02/21.           High Blood Pressure Screen on  02/03/21.           High Blood Pressure Screen on  01/21/21.           High Blood Pressure Screen on  12/29/20.           High Blood Pressure Screen on  10/07/20.           Tobacco Use Screen on  09/29/21.           Tobacco Use Screen on  09/09/21.           Tobacco Use Screen on  08/12/21.           Tobacco Use Screen on  06/23/21.           Tobacco Use Screen on  03/02/21.           Tobacco Use Screen on  02/03/21.           Tobacco Use Screen on  01/21/21.           Tobacco Use Screen on  12/29/20.           Tobacco Use Screen on  10/07/20.        Refused            Influenza Vaccine on  10/07/20.            Tetanus Vaccine on  10/07/20.

## 2022-02-16 NOTE — PROGRESS NOTES
Patient:   MARLEN ARDON            MRN: [id number]            FIN: [id number]               Age:   51 years     Sex:  Female     :  1970   Associated Diagnoses:   Shortness of breath; COVID-19 virus infection   Author:   Jurgen Potts PA-C      Visit Information      Date of Service: 2021 01:31 pm  Performing Location: [facility]  Encounter#: [id number]      Primary Care Provider (PCP):  Justina Laws MD    NPI# [id number]      Referring Provider:  Jurgen Potts PA-C    NPI# [id number]      Chief Complaint   2021 1:35 PM CDT    f/u from last visit--voice hoarseness, muscle spasms below rib cage.      History of Present Illness             The patient presents with cough.  The cough is described as hacking.  The severity of the cough is mild.  The cough fluctuates in intensity.  The cough has lasted for 5 week(s).  Covid infection end of Sept. Continues with cough and mild STOVER. Left shoulder pain. Developed laryngitis over the weekend. No fever. Work has been difficult. Having difficulty working with HR. CC above noted and confirmed with the patient..  Using Flovent and albuterol with minimal improvement..  Associated symptoms consist of sore throat and denies fever.        Review of Systems   Constitutional:  Negative except as documented in history of present illness.    Eye:  Negative.    Ear/Nose/Mouth/Throat:  Negative except as documented in history of present illness.    Respiratory:  Negative except as documented in history of present illness.    Cardiovascular:  Negative except as documented in history of present illness.    Musculoskeletal:  Negative except as documented in history of present illness.       Health Status   Allergies:    Allergic Reactions (Selected)  Severity Not Documented  Doxycycline (Ill)  Naproxen (No reactions were documented)  Sulfa drug (Hives)   Medications:  (Selected)   Prescriptions  Prescribed  Albuterol (Eqv-ProAir HFA) 90 mcg/inh  inhalation aerosol: 2 puff(s), Inhale, q6 hrs, # 6.7 gm, 0 Refill(s), Type: Maintenance, Pharmacy: [facility], 2 puff(s) Inhale q6 hrs, 62.5, in, 10/11/21 16:26:00 CDT, Height Measured, 139.5, lb, 10/11/21 16:26:00 CDT, Weight Measured  Flovent  mcg/inh inhalation aerosol: ( 220 mcg ), Inhale, bid, # 1 EA, 0 Refill(s), Type: Maintenance, Pharmacy: [facility], 220 mcg Inhale bid, 62.5, in, 10/27/21 13:14:00 CDT, Height Measured, 140.7, lb, 10/27/21 13:14:00 CDT, Weight Measured  levothyroxine 88 mcg (0.088 mg) oral tablet: = 1 tab(s), Oral, daily, Instructions: INCREASE., # 90 tab(s), 2 Refill(s), Type: Maintenance, Pharmacy: [facility], TAKE 1 TABLET BY MOUTH ONCE DAILY ( INCREASE ), 62.5, in, 10/07/20 11:31:00 CDT, Height Measured, 149, lb, 10/07/20 11:31:00 CDT,...  Documented Medications  Documented  Calcium 600+D: 2 tab(s), po, daily, 0 Refill(s), Type: Maintenance  Colace 50 mg oral capsule: = 1 cap(s) ( 50 mg ), Oral, bid, 0 Refill(s), Type: Maintenance,    Medications          *denotes recorded medication          Albuterol (Eqv-ProAir HFA) 90 mcg/inh inhalation aerosol: 2 puff(s), Inhale, q6 hrs, 6.7 gm, 0 Refill(s).          *Calcium 600+D: 2 tab(s), po, daily.          *Colace 50 mg oral capsule: 50 mg, 1 cap(s), Oral, bid, 0 Refill(s).          Flovent  mcg/inh inhalation aerosol: 220 mcg, Inhale, bid, 1 EA, 0 Refill(s).          levothyroxine 88 mcg (0.088 mg) oral tablet: 1 tab(s), Oral, daily, INCREASE., 90 tab(s), 2 Refill(s).       Problem list:    All Problems  Unspecified abdominal pain / SNOMED CT 57797194 / Confirmed  Ovarian cyst / SNOMED CT 10FR30V3-YC39-5H2G-A10U-[id number] / Confirmed  Osteopenia / SNOMED CT 587445790 / Confirmed  Chronic insomnia / SNOMED CT 334309264 / Confirmed  Anxiety Disorder / SNOMED CT 6MG12M65-102P-44A8-IB33-MAI84703S345 / Confirmed  Acquired hypothyroidism / SNOMED CT 209120423 / Confirmed      Histories   Past Medical History:     Active  Ovarian cyst ([id number])  Comments:  10/17/2013 CDT 11:40 AM CDT - Yulisa Ray LPN  Right ovary  Osteopenia (479450532)  Chronic insomnia (246252631)  Anxiety Disorder ([id number]-[id number])  Acquired hypothyroidism (174702280)  Unspecified abdominal pain (68772304)   Family History:    MI  Father  Diabetes mellitus  Mother  Hypertension  Father     Procedure history:    Colonoscopy (057069230) on 3/26/2021 at 50 Years.  Comments:  4/15/2021 7:59 AM CDT - Rama Pemberton  Indication: Left lower quadrant abdominal pain.   Sedation: Versed, Fentanyl.  Findings: Normal with the exception of severe diverticulosis in descending and sigmoid colon.  Hysterectomy and unilateral salpingo-oophorectomy sample ([id number]) on 6/2/2005 at 34 Years.  Comments:  10/17/2013 11:39 AM CDT - Yulisa Ray LPN  L ov elisabet removed.   Social History:        Electronic Cigarette/Vaping Assessment: Denies Electronic Cigarette Use            Electronic Cigarette Use: Never.      Alcohol Assessment            1 x per month, 1 drinks/episode average.  2 drinks/episode maximum.      Tobacco Assessment: Past            Former smoker, quit more than 30 days ago      Home and Environment Assessment            Marital status:  x 2.      Nutrition and Health Assessment            Type of diet: Regular.      Exercise and Physical Activity Assessment: Regular exercise        Physical Examination   Vital Signs   11/1/2021 1:35 PM CDT Temperature Tympanic 96.4 DegF  LOW    Peripheral Pulse Rate 98 bpm    Pulse Site Radial artery    HR Method Electronic    Systolic Blood Pressure 132 mmHg  HI    Diastolic Blood Pressure 74 mmHg    Mean Arterial Pressure 93 mmHg    BP Site Right arm    BP Method Electronic      Measurements from flowsheet : Measurements   11/1/2021 1:35 PM CDT Height Measured - Standard 62.5 in    Weight Measured - Standard 139.8 lb    BSA 1.67 m2    Body Mass Index 25.16  kg/m2  HI      HENT:       Throat: Pharynx ( Erythematous, lymphoid hyperplasia ).    Neck:  Supple, Non-tender, No lymphadenopathy.    Respiratory:  Lungs are clear to auscultation, Respirations are non-labored.    Cardiovascular:  Normal rate, Regular rhythm.    Musculoskeletal:  Mild left sided lateral rib cage tenderness.    Psychiatric:  Cooperative, Appropriate mood & affect.       Review / Management   Chest x-ray results   Reviewed radiologist's report, Reveals no acute disease process        Consistent with: discussed with the patient. D-Dimer is pending.      Impression and Plan   Diagnosis     Shortness of breath (UPQ16-AF R06.02).     COVID-19 virus infection (VHW45-XI U07.1).     Orders     Orders (Selected)   Outpatient Orders  Ordered (In Transit)  D-Dimer, Quantitative* ([facility]): Specimen Type: Plasma, Collection Date: 11/01/21 13:51:00 CDT.

## 2022-02-16 NOTE — NURSING NOTE
"Comprehensive Intake Entered On:  8/25/2020 10:03 AM CDT    Performed On:  8/25/2020 9:56 AM CDT by Percy Beckett CMA               Summary   Chief Complaint :   Pt here for \"bug bite\" on back of left knee x 1 day.   Menstrual Status :   Hysterectomy   Weight Measured :   148 lb(Converted to: 148 lb 0 oz, 67.13 kg)    Height Measured :   62.5 in(Converted to: 5 ft 2 in, 158.75 cm)    Body Mass Index :   26.64 kg/m2 (HI)    Body Surface Area :   1.72 m2   Systolic Blood Pressure :   114 mmHg   Diastolic Blood Pressure :   72 mmHg   Mean Arterial Pressure :   86 mmHg   Peripheral Pulse Rate :   72 bpm   BP Site :   Right arm   Pulse Site :   Radial artery   Temperature Tympanic :   97.3 DegF(Converted to: 36.3 DegC)  (LOW)    Respiratory Rate :   16 br/min   Percy Beckett CMA - 8/25/2020 9:56 AM CDT   Health Status   Allergies Verified? :   Yes   Medication History Verified? :   Yes   Immunizations Current :   Other: Declined TDAP.   Medical History Verified? :   Yes   Pre-Visit Planning Status :   Not completed   Tobacco Use? :   Former smoker   Percy Beckett CMA - 8/25/2020 9:56 AM CDT   Meds / Allergies   (As Of: 8/25/2020 10:03:34 AM CDT)   Allergies (Active)   doxycycline  Estimated Onset Date:   Unspecified ; Reactions:   Ill ; Created By:   Nena Edouard CMA; Reaction Status:   Active ; Category:   Drug ; Substance:   doxycycline ; Type:   Allergy ; Updated By:   Nena Edouard CMA; Reviewed Date:   8/25/2020 10:01 AM CDT      naproxen  Estimated Onset Date:   Unspecified ; Created By:   Yulisa Ray LPN; Reaction Status:   Active ; Category:   Drug ; Substance:   naproxen ; Type:   Allergy ; Updated By:   Yulisa Ray LPN; Reviewed Date:   8/25/2020 10:01 AM CDT      sulfa drug  Estimated Onset Date:   Unspecified ; Reactions:   Hives ; Created By:   Yulisa Ray LPN; Reaction Status:   Active ; Category:   Drug ; Substance:   sulfa drug ; Type:   Allergy ; Updated By:   Yulisa Rya LPN; Reviewed " Date:   8/25/2020 10:01 AM CDT        Medication List   (As Of: 8/25/2020 10:03:34 AM CDT)   Prescription/Discharge Order    levothyroxine  :   levothyroxine ; Status:   Completed ; Ordered As Mnemonic:   levothyroxine 75 mcg (0.075 mg) oral tablet ; Simple Display Line:   75 mcg, 1 tab(s), po, daily, 90 tab(s), 1 Refill(s) ; Ordering Provider:   Justina Laws MD; Catalog Code:   levothyroxine ; Order Dt/Tm:   4/20/2020 4:09:26 PM CDT          levothyroxine  :   levothyroxine ; Status:   Prescribed ; Ordered As Mnemonic:   levothyroxine 88 mcg (0.088 mg) oral tablet ; Simple Display Line:   88 mcg, 1 tab(s), PO, Daily, 90 tab(s), 1 Refill(s) ; Ordering Provider:   Justina Laws MD; Catalog Code:   levothyroxine ; Order Dt/Tm:   7/14/2020 11:38:00 AM CDT          Miscellaneous Rx Supply  :   Miscellaneous Rx Supply ; Status:   Prescribed ; Ordered As Mnemonic:   Magic mouthwash ; Simple Display Line:   10 ml QID PRN, Oral, qid, 1 Part viscous lidocaine 2%;  1 Part Maalox;  1 Part diphenhydramine 12.5 mg per 5 ml elixir; 120 ml total, 120 mL, 0 Refill(s) ; Ordering Provider:   Jurgen Potts PA-C; Catalog Code:   Miscellaneous Rx Supply ; Order Dt/Tm:   5/29/2020 11:20:46 AM CDT            Home Meds    Miscellaneous Prescription  :   Miscellaneous Prescription ; Status:   Documented ; Ordered As Mnemonic:   Dietary SUpplement-Acemannan ; Simple Display Line:   0 Refill(s) ; Catalog Code:   Miscellaneous Prescription ; Order Dt/Tm:   8/25/2020 10:00:56 AM CDT          calcium-vitamin D  :   calcium-vitamin D ; Status:   Documented ; Ordered As Mnemonic:   Calcium 600+D ; Simple Display Line:   2 tab(s), po, daily ; Catalog Code:   calcium-vitamin D ; Order Dt/Tm:   8/4/2014 9:31:21 AM CDT            ID Risk Screen   Recent Travel History :   No recent travel   Family Member Travel History :   No recent travel   Other Exposure to Infectious Disease :   Unknown   Percy Beckett CMA - 8/25/2020 9:56 AM CDT   Social  History   Social History   (As Of: 8/25/2020 10:03:34 AM CDT)   Alcohol:        1 x per month, 1 drinks/episode average.  2 drinks/episode maximum.   (Last Updated: 3/13/2017 3:39:56 PM CDT by Yulisa Ray LPN)          Tobacco:  Past      Past   Comments:  12/30/2014 3:44 PM - Yulisa Ray LPN: Quit 3 yrs ago.   (Last Updated: 12/30/2014 3:44:04 PM CST by Yulisa Ray LPN)          Home/Environment:        Marital status:  x 2.   (Last Updated: 9/18/2014 9:11:25 AM CDT by Rama Pemberton)          Nutrition/Health:        Type of diet: Regular.   (Last Updated: 10/17/2013 11:40:58 AM CDT by Yulisa Ray LPN)          Exercise:  Regular exercise      (Last Updated: 10/17/2013 11:40:49 AM CDT by Yulisa Ray LPN )

## 2022-02-16 NOTE — TELEPHONE ENCOUNTER
---------------------  From: Mary Boswell RN   Sent: 12/21/2021 8:27:58 AM CST  Subject: Return to work paperwork     Return to work paperwork from 11-17-21 was left at  for greater than 30 days.  Pt has had multiple appointments since this note. Can re-print if needed.  Shredded.

## 2022-02-16 NOTE — TELEPHONE ENCOUNTER
---------------------  From: Claudia Goldstein CMA   To: [employer] Message Pool ([id number]_[facility]);     Sent: 10/4/2021 11:14:20 AM CDT  Subject: covid results     Pt notified via telephone of positive covid results.  Informed that the [facility] nurse will be contacting her with further information.  Pt voiced understanding and will call the clinic with any further questions or concerns. Pt stated she is already starting to feel better and that the [facility] nurse called her over the weekend.---------------------  From: Yvon Clinton LPN ([employer] Message Pool ([id number]_[facility]))   To: Justina Laws MD;     Sent: 10/4/2021 11:27:34 AM CDT  Subject: FW: covid resultsNoted. Thanks

## 2022-02-16 NOTE — PROGRESS NOTES
Patient:   MARLEN ARDON            MRN: [id number]            FIN: [id number]               Age:   46 years     Sex:  Female     :  1970   Associated Diagnoses:   Bacterial vaginosis   Author:   Justina Laws MD      Chief Complaint   2/15/2017 1:41 PM CST    c/o vaginal yeast infection, itchy, irritation x 3 days      History of Present Illness   Patient with recent GI bug, diarrhea and vomiting. Had several episodes of incontinence. Has developed vaginal irritation, some discharge, pelvic heaviness. No bleeding. Bowel symptoms somewhat better but still low grade fevers, queasy stomach.      Health Status   Allergies:    Allergic Reactions (All)  Severity Not Documented  Naproxen (No reactions were documented)  Sulfa drug (Hives)   Medications:  (Selected)   Prescriptions  Prescribed  clindamycin 2% vaginal cream: 1 anthony, VAG, Once a day (at bedtime), # 40 gm, 1 Refill(s), Type: Maintenance, Pharmacy: [id number], 1 anthony vag hs,x7 day(s)  levothyroxine 75 mcg (0.075 mg) oral tablet: 1 tab(s) ( 75 mcg ), po, daily, # 90 tab(s), 3 Refill(s), Type: Maintenance, Pharmacy: [id number], 1 tab(s) po daily  Documented Medications  Documented  Calcium 600+D: 2 tab(s), po, daily, 0 Refill(s), Type: Maintenance   Problem list:    All Problems  Acquired hypothyroidism / SNOMED CT 174348796 / Confirmed  Anxiety Disorder / SNOMED CT 8MV00H59-927J-99G6-DJ04-GCQ84072I378 / Confirmed  Chronic insomnia / SNOMED CT 174547049 / Confirmed  Osteopenia / SNOMED CT 929090438 / Confirmed  Ovarian cyst / SNOMED CT 94OI61L0-EJ53-0R9Q-G55R-7OUD34B282CH / Confirmed      Histories   Past Medical History:    Active  Ovarian cyst (SNOMED CT 28QD74U0-JN93-7X4Y-J56P-0GNZ21N770HG)  Comments:  10/17/2013 CDT 11:40 AM CDT - Yulisa Ray LPN  Right ovary   Procedure history:    Hysterectomy and unilateral salpingo-oophorectomy sample ([id number]) on 2005 at 34 Years.  Comments:  10/17/2013 11:39 AM - Cory  SEMAJ, Yulisa GARCIA ov elisabet removed.      Physical Examination   Vital Signs   2/15/2017 1:41 PM CST Temperature Tympanic 99.1 DegF    Peripheral Pulse Rate 84 bpm    Pulse Site Radial artery    Systolic Blood Pressure 98 mmHg    Diastolic Blood Pressure 70 mmHg    Mean Arterial Pressure 79 mmHg    BP Site Right arm    BP Method Manual      Measurements from flowsheet : Measurements   2/15/2017 1:41 PM CST Height Measured - Standard 63 in    Weight Measured - Standard 131.4 lb    BSA 1.63 m2    Body Mass Index 23.27 kg/m2      General:  Alert and oriented, No acute distress.    Genitourinary (exam performed by medical student Nena Huerta under my direct supervision and with permission of the patient):  No urethral discharge.         Vulva: Within normal limits.         Vagina: Discharge ( Not white, Yellow (thin and runny) ), No foreign body, No laceration, No lesions.         Cervix: No lesions.         Uterus: Not tender.       Review / Management   Results review:  Lab results   2/15/2017 2:15 PM CST Bacteria POC Absent    Clue cells POC Present    Trichomonas POC Absent    WBC POC Absent    Yeast POC Absent    POC Test Comments Relative absence of normal Margarita.  Epi cells adequate.    POC Test Comments POC Test Comments   .       Impression and Plan   Diagnosis     Bacterial vaginosis (DBF42-ZJ N76.0).     Orders     Orders   Pharmacy:  clindamycin 2% vaginal cream (Prescribe): 1 anthony, VAG, Once a day (at bedtime), x 7 day(s), # 40 gm, 1 Refill(s), Type: Maintenance, Pharmacy: [facility] #[id number], 1 anthony vag hs,x7 day(s).     Given ongoing GI illness symptoms, off work for the rest of this week. Back to work when school opens again next Tuesday.

## 2022-02-16 NOTE — TELEPHONE ENCOUNTER
---------------------  From: Gallo/Cathy OLIVAREZ (Phone Messages [employer] ([id number]_[facility]))   To: Jurgen Potts PA-C;     Sent: 12/6/2021 8:26:29 AM CST  Subject: General Message     Phone Message    PCP: Asking for KAH     Time of Call: 0821    Phone Number: [phone]    Returned call at: n/a    Note: Call from pt stating that she started to have muscle spasms on the L side of her lung. She states that now she is having muscle spasms on the R side as well. Patient states that she also developed a dry cough. She states that she did have covid and feels like the dry cough is from that. Patient wondering if KRYSTIN wants her to be seen in clinic. Please advise---------------------  From: Jurgen Potts PA-C   To: Phone [employer] ([id number]_[place] [facility] [place]);     Sent: 12/6/2021 8:35:50 AM CST  Subject: RE: General Message     She can FU later this week with either KWRADHA or myself.    KAHHU spoke with patient. She will call back to schedule

## 2022-02-16 NOTE — TELEPHONE ENCOUNTER
---------------------  From: Rosi Zimmerman LPN (Phone Messages Pool ([id number]_[facility]))   Sent: 10/1/2021 3:01:50 PM CDT  Subject: covid results     Phone Message    PCP:   CHILANGO      Time of Call:  3:00pm       Person Calling:  pt    Note:   Pt calling for covid results. Tested 9/29. Told pt results are taking 48-72 hours and results are not back yet.    Last office visit and reason:  9/29/21 telephone encounter

## 2022-02-16 NOTE — PROGRESS NOTES
Patient:   MARLEN ARDON            MRN: [id number]            FIN: [id number]               Age:   48 years     Sex:  Female     :  1970   Associated Diagnoses:   Diverticulitis   Author:   Justina Laws MD      Chief Complaint   3/19/2019 6:09 PM CDT    follow-up from visit on Friday 3/15/19;      History of Present Illness   diverticulitis follow up  patient discontinued medications due to bitter taste, not feeling completely better  still having some pain  bowels have been loose      Health Status   Allergies:    Allergic Reactions (All)  Severity Not Documented  Doxycycline (Ill)  Naproxen (No reactions were documented)  Sulfa drug (Hives)   Medications:  (Selected)   Prescriptions  Prescribed  Cipro 500 mg oral tablet: = 1 tab(s) ( 500 mg ), PO, q12hr, x 7 day(s), # 14 tab(s), 0 Refill(s), Type: Acute, Pharmacy: [email or web address], 1 tab(s) Oral q12 hrs,x7 day(s)  Flagyl 500 mg oral tablet: = 1 tab(s) ( 500 mg ), PO, q8hr, x 7 day(s), # 21 tab(s), 0 Refill(s), Type: Acute, Pharmacy: [email or web address], 1 tab(s) Oral q8 hrs,x7 day(s)  levothyroxine 100 mcg (0.1 mg) oral tablet: = 1 tab(s) ( 100 mcg ), PO, Daily, # 90 tab(s), 3 Refill(s), Type: Maintenance, Pharmacy: [facility] #[id number], 1 tab(s) Oral daily  Documented Medications  Documented  Calcium 600+D: 2 tab(s), po, daily, 0 Refill(s), Type: Maintenance   Problem list:    All Problems  Anxiety Disorder / SNOMED CT 0JM70U06-769H-23T6-ZE18-[id number] / Confirmed  Acquired hypothyroidism / SNOMED CT 548767098 / Confirmed  Chronic insomnia / SNOMED CT 153304981 / Confirmed  Osteopenia / SNOMED CT 085076664 / Confirmed  Ovarian cyst / SNOMED CT 02EF49U1-SR53-5C1W-A86J-5CDZ01C550JI / Confirmed      Histories   Past Medical History:    Active  Ovarian cyst (SNOMED CT 10FT65I5-XQ15-8O4N-V16Y-0OVK88F265KQ)  Comments:  10/17/2013 CDT 11:40 AM CDT - Yulisa Ray LPN  Right ovary   Family History:    MI  Father  Diabetes  mellitus  Mother  Hypertension  Father     Procedure history:    Hysterectomy and unilateral salpingo-oophorectomy sample ([id number]) on 6/2/2005 at 34 Years.  Comments:  10/17/2013 11:39 AM CDT - Yulisa Ray LPN  RADHA ov elisabet removed.   Social History:        Alcohol Assessment            1 x per month, 1 drinks/episode average.  2 drinks/episode maximum.      Tobacco Assessment: Past            Past                     Comments:                      12/30/2014 - Cory CHA Yulisa                     Quit 3 yrs ago.      Home and Environment Assessment            Marital status:  x 2.      Nutrition and Health Assessment            Type of diet: Regular.      Exercise and Physical Activity Assessment: Regular exercise      Physical Examination   Vital Signs   3/19/2019 6:09 PM CDT Peripheral Pulse Rate 70 bpm    HR Method Manual    Systolic Blood Pressure 110 mmHg    Diastolic Blood Pressure 60 mmHg    Mean Arterial Pressure 77 mmHg    BP Method Manual      Measurements from flowsheet : Measurements   3/19/2019 6:09 PM CDT Height Measured - Standard 62.5 in    Weight Measured - Standard 146.0 lb    BSA 1.71 m2    Body Mass Index 26.28 kg/m2  HI      General:  Alert and oriented.    HENT:  Oral mucosa is moist.    Gastrointestinal:  left lower quadrant pain.       Review / Management   Results review:  Lab results   3/15/2019 9:21 AM CDT Urine Color Urine Dipstick Yellow    Urine Appearance Urine Dipstick Clear    pH Urine Dipstick 8.5    Specific Gravity Urine Dipstick 1.020    Glucose Urine Dipstick Negative    Bilirubin Urine Dipstick Negative    Ketones Urine Dipstick Negative    Blood Urine Dipstick Negative    Protein Urine Dipstick Negative    Nitrite Urine Dipstick Negative    Leukocyte Esterase Urine Dipstick Negative    Urobilinogen Urine Dipstick 0.2 mg/dl    POC Test Comments POC Test Comments   3/15/2019 9:16 AM CDT WBC TR 9.1 x10^3/uL    RBC TR 4.68 x10^6/uL    Hgb TR 12.9 g/dL    Hct TR 41.3  %    MCV TR 88.2 fL    MCH TR 27.5 pg    MCHC TR 31.2 gm/dL    RDW TR 13.4 %    Platelet  x10^3/uL    Lymphocytes TR 14.4 %    Neutrophils TR 79.3 %    Monocytes TR 6.3 %   .    Radiology results   CT reviewed      Impression and Plan   Diagnosis     Diverticulitis (NNR52-QO K57.92).     Course:  not resolved.    Plan:  will change antibiotics.    Orders     Orders (Selected)   Prescriptions  Prescribed  Augmentin 875 mg oral tablet: 1 tab(s), PO, q12hr, # 20 tab(s), 0 Refill(s), Type: Maintenance, 1 tab(s) Oral q12 hrs,x10 day(s).

## 2022-02-16 NOTE — NURSING NOTE
Comprehensive Intake Entered On:  6/1/2020 10:46 AM CDT    Performed On:  6/1/2020 10:41 AM CDT by Alessandra Martínez               Summary   Chief Complaint :   F/u sore throat. Also c/o left ankle pain.    Menstrual Status :   Hysterectomy   Height Measured :   62.5 in(Converted to: 5 ft 2 in, 158.75 cm)    Ht/Wt Measurement Refused by Patient? :   Yes   Systolic Blood Pressure :   98 mmHg   Diastolic Blood Pressure :   54 mmHg (LOW)    Mean Arterial Pressure :   69 mmHg   Peripheral Pulse Rate :   80 bpm   Temperature Tympanic :   97.9 DegF(Converted to: 36.6 DegC)    Alessandra Martínez - 6/1/2020 10:41 AM CDT   Health Status   Immunizations Current :   Other: Declined TDAP.   Alessandra Martínez - 6/1/2020 10:41 AM CDT   Meds / Allergies   (As Of: 6/1/2020 10:46:33 AM CDT)   Allergies (Active)   doxycycline  Estimated Onset Date:   Unspecified ; Reactions:   Ill ; Created By:   Nena Edouard CMA; Reaction Status:   Active ; Category:   Drug ; Substance:   doxycycline ; Type:   Allergy ; Updated By:   Nena Edouard CMA; Reviewed Date:   6/1/2020 10:44 AM CDT      naproxen  Estimated Onset Date:   Unspecified ; Created By:   Yulisa Ray LPN; Reaction Status:   Active ; Category:   Drug ; Substance:   naproxen ; Type:   Allergy ; Updated By:   Yulisa Ray LPN; Reviewed Date:   6/1/2020 10:44 AM CDT      sulfa drug  Estimated Onset Date:   Unspecified ; Reactions:   Hives ; Created By:   Yulisa Ray LPN; Reaction Status:   Active ; Category:   Drug ; Substance:   sulfa drug ; Type:   Allergy ; Updated By:   Yulisa Ray LPN; Reviewed Date:   6/1/2020 10:44 AM CDT        Medication List   (As Of: 6/1/2020 10:46:33 AM CDT)   Prescription/Discharge Order    Miscellaneous Rx Supply  :   Miscellaneous Rx Supply ; Status:   Prescribed ; Ordered As Mnemonic:   Magic mouthwash ; Simple Display Line:   10 ml QID PRN, Oral, qid, 1 Part viscous lidocaine 2%;  1 Part Maalox;  1 Part diphenhydramine 12.5  mg per 5 ml elixir; 120 ml total, 120 mL, 0 Refill(s) ; Ordering Provider:   Jurgen Potts PA-C; Catalog Code:   Miscellaneous Rx Supply ; Order Dt/Tm:   5/29/2020 11:20:46 AM CDT          levothyroxine  :   levothyroxine ; Status:   Prescribed ; Ordered As Mnemonic:   levothyroxine 75 mcg (0.075 mg) oral tablet ; Simple Display Line:   75 mcg, 1 tab(s), po, daily, 90 tab(s), 1 Refill(s) ; Ordering Provider:   Justina Laws MD; Catalog Code:   levothyroxine ; Order Dt/Tm:   4/20/2020 4:09:26 PM CDT            Home Meds    calcium-vitamin D  :   calcium-vitamin D ; Status:   Documented ; Ordered As Mnemonic:   Calcium 600+D ; Simple Display Line:   2 tab(s), po, daily ; Catalog Code:   calcium-vitamin D ; Order Dt/Tm:   8/4/2014 9:31:21 AM CDT            ID Risk Screen   Recent Travel History :   No recent travel   Family Member Travel History :   No recent travel   Other Exposure to Infectious Disease :   Unknown   Alessandra Martínez - 6/1/2020 10:41 AM CDT

## 2022-02-16 NOTE — TELEPHONE ENCOUNTER
---------------------  From: Jurgen Potts PA-C   To: Appointment Pool ([id number]);     Sent: 9/29/2021 12:16:41 PM CDT  Subject: Test today     Please put on today's C-19 curbside schedule.    KAHpt added

## 2022-02-16 NOTE — PROGRESS NOTES
Patient:   MARLEN ARDON            MRN: [id number]            FIN: [id number]               Age:   50 years     Sex:  Female     :  1970   Associated Diagnoses:   Acquired hypothyroidism; Screening for lipid disorders; Screening for diabetes mellitus   Author:   Justina Laws MD      Chief Complaint   2020 10:35 AM CDT   discuss TSH, recheck TSH, fasting for labs      Interval History   patient here for thyroid follow up  patient has noticed some swelling in neck, suspects goiter  had decreased dose of levothyroxine with last TSH  patient has been a little more tired, otherwise no health changes noted  fasting for labs, will check for diabetes and lipids today      Health Status   Allergies:    Allergic Reactions (All)  Severity Not Documented  Doxycycline (Ill)  Naproxen (No reactions were documented)  Sulfa drug (Hives)   Medications:  (Selected)   Prescriptions  Prescribed  Magic mouthwash: Magic mouthwash, 10 ml QID PRN, Oral, qid, Instructions: 1 Part viscous lidocaine 2%;  1 Part Maalox;  1 Part diphenhydramine 12.5 mg per 5 ml elixir; 120 ml total, Supply, # 120 mL, 0 Refill(s), Type: Maintenance, Pharmacy: [facility], 10 ml QID...  levothyroxine 75 mcg (0.075 mg) oral tablet: = 1 tab(s) ( 75 mcg ), po, daily, # 90 tab(s), 1 Refill(s), Type: Maintenance, Pharmacy: [facility], to replace 88mcg levothyroxine, 1 tab(s) Oral daily  Documented Medications  Documented  Calcium 600+D: 2 tab(s), po, daily, 0 Refill(s), Type: Maintenance   Problem list:    All Problems  Ovarian cyst / SNOMED CT [id number] / Confirmed  Right ovary  Osteopenia / SNOMED CT 668980899 / Confirmed  Chronic insomnia / SNOMED CT 767709200 / Confirmed  Acquired hypothyroidism / SNOMED CT 623010692 / Confirmed  Anxiety Disorder / SNOMED CT [id number] / Confirmed      Histories   Family History:    MI  Father  Diabetes mellitus  Mother  Hypertension  Father      Procedure history:    Hysterectomy and unilateral salpingo-oophorectomy sample ([id number]) on 6/2/2005 at 34 Years.  Comments:  10/17/2013 11:39 AM CDT - Cory CHA, Yulisa harpery removed.      Physical Examination   Vital Signs   7/13/2020 10:35 AM CDT Peripheral Pulse Rate 66 bpm    HR Method Manual    Systolic Blood Pressure 100 mmHg    Diastolic Blood Pressure 70 mmHg    Mean Arterial Pressure 80 mmHg    BP Method Manual      Measurements from flowsheet : Measurements   7/13/2020 10:35 AM CDT Height Measured - Standard 62.5 in    Weight Measured - Standard 142 lb    BSA 1.68 m2    Body Mass Index 25.56 kg/m2  HI      General:  Alert and oriented, No acute distress.    Eye:  Pupils are equal, round and reactive to light, Normal conjunctiva.    HENT:  Normocephalic, Oral mucosa is moist.    Neck:  Supple, Non-tender, No lymphadenopathy, mild sweling to mid thyroid.       Impression and Plan   Diagnosis     Acquired hypothyroidism (FUQ35-JV E03.4).     Screening for lipid disorders (BAC30-SD Z13.220).     Screening for diabetes mellitus (RVM82-EF Z13.1).     Course:  suspect TSH might be off, if needs higher dose will monitor swelling, if TSH is normal will plan for ultrasound.    Orders     Orders (Selected)   Outpatient Orders  Ordered  Return to Clinic (Request): RFV: TSH med check, Return in 1 year  Ordered (In Transit)  Glucose* ([facility]): Specimen Type: Serum, Collection Date: 07/13/20 11:00:00 CDT  Lipid panel with reflex to direct ldl* ([facility]): Specimen Type: Serum, Collection Date: 07/13/20 11:00:00 CDT  TSH* ([facility]): Specimen Type: Serum, Collection Date: 07/13/20 11:00:00 CDT.

## 2022-02-16 NOTE — NURSING NOTE
Comprehensive Intake Entered On:  2/13/2020 2:56 PM CST    Performed On:  2/13/2020 2:50 PM CST by Ivana Shields MA               Summary   Chief Complaint :   Vomiting, diarrhea, tired, just not feeling well started Monday morning, stomach still not feeling well, needs note for work   Menstrual Status :   Hysterectomy   Weight Measured :   147 lb(Converted to: 147 lb 0 oz, 66.68 kg)    Height Measured :   62.5 in(Converted to: 5 ft 2 in, 158.75 cm)    Body Mass Index :   26.46 kg/m2 (HI)    Body Surface Area :   1.71 m2   Systolic Blood Pressure :   116 mmHg   Diastolic Blood Pressure :   70 mmHg   Mean Arterial Pressure :   85 mmHg   Peripheral Pulse Rate :   80 bpm   BP Site :   Right arm   Pulse Site :   Radial artery   BP Method :   Manual   HR Method :   Manual   Temperature Tympanic :   98.5 DegF(Converted to: 36.9 DegC)    Ivana Shields MA - 2/13/2020 2:50 PM CST   Health Status   Allergies Verified? :   Yes   Medication History Verified? :   Yes   Immunizations Current :   Other: Declined TDAP.   Medical History Verified? :   Yes   Pre-Visit Planning Status :   Completed   Tobacco Use? :   Former smoker   Ivana Shields MA - 2/13/2020 2:50 PM CST   Consents   Consent for Immunization Exchange :   Consent Granted   Consent for Immunizations to Providers :   Consent Granted   Ivana Shields MA - 2/13/2020 2:50 PM CST   Meds / Allergies   (As Of: 2/13/2020 2:56:01 PM CST)   Allergies (Active)   doxycycline  Estimated Onset Date:   Unspecified ; Reactions:   Ill ; Created By:   Nena Edouard CMA; Reaction Status:   Active ; Category:   Drug ; Substance:   doxycycline ; Type:   Allergy ; Updated By:   Nena Edouard CMA; Reviewed Date:   2/13/2020 2:54 PM CST      naproxen  Estimated Onset Date:   Unspecified ; Created By:   Yulisa Ray LPN; Reaction Status:   Active ; Category:   Drug ; Substance:   naproxen ; Type:   Allergy ; Updated By:   Yulisa Ray LPN; Reviewed Date:   2/13/2020 2:54 PM CST       sulfa drug  Estimated Onset Date:   Unspecified ; Reactions:   Hives ; Created By:   Yulisa Ray LPN; Reaction Status:   Active ; Category:   Drug ; Substance:   sulfa drug ; Type:   Allergy ; Updated By:   Yulisa Ray LPN; Reviewed Date:   2/13/2020 2:54 PM CST        Medication List   (As Of: 2/13/2020 2:56:01 PM CST)   Prescription/Discharge Order    levothyroxine  :   levothyroxine ; Status:   Prescribed ; Ordered As Mnemonic:   levothyroxine 88 mcg (0.088 mg) oral tablet ; Simple Display Line:   88 mcg, 1 tab(s), PO, Daily, 90 tab(s), 3 Refill(s) ; Ordering Provider:   Justina Laws MD; Catalog Code:   levothyroxine ; Order Dt/Tm:   7/17/2019 9:14:01 AM CDT            Home Meds    calcium-vitamin D  :   calcium-vitamin D ; Status:   Documented ; Ordered As Mnemonic:   Calcium 600+D ; Simple Display Line:   2 tab(s), po, daily ; Catalog Code:   calcium-vitamin D ; Order Dt/Tm:   8/4/2014 9:31:21 AM CDT

## 2022-02-16 NOTE — PROGRESS NOTES
"   Patient:   MARLEN ARDON            MRN: [id number]            FIN: [id number]               Age:   47 years     Sex:  Female     :  1970   Associated Diagnoses:   Acquired hypothyroidism; Fatigue; Rash   Author:   Justina Laws MD      Chief Complaint   2017 1:17 PM CDT    Patient here for 2 bites near ankle on right leg x 1 week. Patient states that the bites itch. Patient also complains that she feel generally fatigued, and \"not herself\"      History of Present Illness   Patient with two red/purple spots on right lateral calf. Noticed a week ago. Itchy, achy. Patient feels fatigued and groggy for that same period of time.   No fevers. Not spreading. Not improving. Unkonwn exposures.      Health Status   Allergies:    Allergic Reactions (All)  Severity Not Documented  Naproxen (No reactions were documented)  Sulfa drug (Hives)   Medications:  (Selected)   Prescriptions  Prescribed  levothyroxine 75 mcg (0.075 mg) oral tablet: 1 tab(s) ( 75 mcg ), po, daily, # 90 tab(s), 0 Refill(s), Type: Maintenance, Pharmacy: [id number], 1 tab(s) po daily  Documented Medications  Documented  Calcium 600+D: 2 tab(s), po, daily, 0 Refill(s), Type: Maintenance   Problem list:    All Problems  Anxiety Disorder / SNOMED CT [id number] / Confirmed  Acquired hypothyroidism / SNOMED CT 760973416 / Confirmed  Chronic insomnia / SNOMED CT 004231458 / Confirmed  Osteopenia / SNOMED CT 478236412 / Confirmed  Ovarian cyst / SNOMED CT [id number] / Confirmed      Histories   Past Medical History:    Active  Ovarian cyst (SNOMED CT [id number])  Comments:  10/17/2013 CDT 11:40 AM CDT - Yulisa Ray LPN  Right ovary      Physical Examination   Vital Signs   2017 1:17 PM CDT Temperature Temporal 98.2 DegF    Peripheral Pulse Rate 84 bpm    Systolic Blood Pressure 114 mmHg    Diastolic Blood Pressure 68 mmHg    Mean Arterial Pressure " 83 mmHg    Oxygen Saturation 99 %      Measurements from flowsheet : Measurements   6/22/2017 1:17 PM CDT Height Measured - Standard 62.5 in    Weight Measured - Standard 134.0 lb    BSA 1.64 m2    Body Mass Index 24.12 kg/m2      General:  Alert and oriented, No acute distress.    Eye:  Pupils are equal, round and reactive to light, Normal conjunctiva.    HENT:  Normocephalic, Oral mucosa is moist, No pharyngeal erythema.    Neck:  Supple, Non-tender, No lymphadenopathy.    Respiratory:  Lungs are clear to auscultation.    Cardiovascular:  Normal rate, Regular rhythm.    Integumentary:  2 dime sized purple slightly raised lesions on lateral calf.       Impression and Plan   Diagnosis     Acquired hypothyroidism (TIH79-OZ E03.4).     Fatigue (KTN26-NP R53.83).     Rash (UIH14-YV R21).     Course:  Not improving.    Patient Instructions:  off work today and tomorrow.    Orders     Orders   Lab (Gen Lab  Reference Lab):  Basic Metabolic Panel* ([facility]) (Order): Specimen Type: Serum, Collection Date: 6/22/2017 1:31 PM CDT  Lyme disease antibody, total, eia with reflex to western blot (igg,igm)* ([facility]) (Order): Specimen Type: Serum, Collection Date: 6/22/2017 1:31 PM CDT  CBC (includes diff/plt)* ([facility]) (Order): Specimen Type: Blood, Collection Date: 6/22/2017 1:31 PM CDT  Sed rate by modified westergren* ([facility]) (Order): Specimen Type: Blood, Collection Date: 6/22/2017 1:31 PM CDT  TSH* ([facility]) (Order): Specimen Type: Serum, Collection Date: 6/22/2017 1:31 PM CDT.     Orders   Pharmacy:  doxycycline monohydrate 100 mg oral capsule (Prescribe): 1 cap(s) ( 100 mg ), PO, bid, x 10 day(s), # 20 cap(s), 0 Refill(s), Type: Maintenance, Pharmacy: [id number], 1 cap(s) po bid,x10 day(s).

## 2022-02-16 NOTE — TELEPHONE ENCOUNTER
---------------------  From: Ivana Shields MA ([id number]   To: Justina Laws MD;     Sent: 3/18/2019 8:49:36 AM CDT  Subject: Phone Note: Diverticulitis     PCP:   CHILANGO      Time of Call:  830       Person Calling:  Martina Keane  Phone number:  [phone]    Returned call at: [phone]    Note:   Patient was seen Friday for stomach pains had CT done and was diagnosed with Diverticulitis. Patient was prescribed Cipro and Flagyl, states she is now having explosive diarrhea and her bowel movements had been more formed before. She is wondering if this is from the medication she was given or the diverticulitis. She has tried rice and jello water and they seemed to help a little bit but does not last long. She has not tried Imodium, was unsure if she could take it.     Pharmacy: _    Last office visit and reason:  3/15/19    Transferred to: PATRICIALWould not recommend imodium. Diarrhea is likely caused by the diverticulitis but should improve in the next 24-48 hours. If not improving, happy to see her at any time.---------------------  From: Justina Laws MD   To: Phone Messages Pool ([id number] - [place]);     Sent: 3/18/2019 9:17:44 AM CDT  Subject: RE: Phone Note: DiverticulitisCalled and let patient know.

## 2022-02-16 NOTE — NURSING NOTE
Comprehensive Intake Entered On:  4/20/2020 3:57 PM CDT    Performed On:  4/20/2020 3:54 PM CDT by Alessandra Martínez               Summary   Chief Complaint :   Verbal consent given for telephone visit. Yeast infection like symptoms. Pt also c/o abd pain. Feels like the last time she had diverticulitis. Pain just started within the last hour. Pt would also like to talk about changing her levothyroxine dose.    Alessandra Martínez - 4/20/2020 3:58 PM CDT   Menstrual Status :   Hysterectomy   Height Measured :   62.5 in(Converted to: 5 ft 2 in, 158.75 cm)    Alessandra Martínez - 4/20/2020 3:54 PM CDT   Health Status   Allergies Verified? :   Yes   Medication History Verified? :   Yes   Immunizations Current :   Other: Declined TDAP.   Medical History Verified? :   Yes   Pre-Visit Planning Status :   Completed   Tobacco Use? :   Former smoker   Alessandra Martínez - 4/20/2020 3:54 PM CDT   Meds / Allergies   (As Of: 4/20/2020 4:01:14 PM CDT)   Allergies (Active)   doxycycline  Estimated Onset Date:   Unspecified ; Reactions:   Ill ; Created By:   Nena Edouard CMA; Reaction Status:   Active ; Category:   Drug ; Substance:   doxycycline ; Type:   Allergy ; Updated By:   Nena Edouard CMA; Reviewed Date:   4/20/2020 3:58 PM CDT      naproxen  Estimated Onset Date:   Unspecified ; Created By:   Yulisa Ray LPN; Reaction Status:   Active ; Category:   Drug ; Substance:   naproxen ; Type:   Allergy ; Updated By:   Yulisa Ray LPN; Reviewed Date:   4/20/2020 3:58 PM CDT      sulfa drug  Estimated Onset Date:   Unspecified ; Reactions:   Hives ; Created By:   Yulisa Ray LPN; Reaction Status:   Active ; Category:   Drug ; Substance:   sulfa drug ; Type:   Allergy ; Updated By:   Yulisa Ray LPN; Reviewed Date:   4/20/2020 3:58 PM CDT        Medication List   (As Of: 4/20/2020 4:01:14 PM CDT)   Prescription/Discharge Order    levothyroxine  :   levothyroxine ; Status:   Prescribed ; Ordered As  Mnemonic:   levothyroxine 88 mcg (0.088 mg) oral tablet ; Simple Display Line:   88 mcg, 1 tab(s), PO, Daily, 90 tab(s), 3 Refill(s) ; Ordering Provider:   Justina Laws MD; Catalog Code:   levothyroxine ; Order Dt/Tm:   7/17/2019 9:14:01 AM CDT            Home Meds    calcium-vitamin D  :   calcium-vitamin D ; Status:   Documented ; Ordered As Mnemonic:   Calcium 600+D ; Simple Display Line:   2 tab(s), po, daily ; Catalog Code:   calcium-vitamin D ; Order Dt/Tm:   8/4/2014 9:31:21 AM CDT

## 2022-02-16 NOTE — NURSING NOTE
Comprehensive Intake Entered On:  12/29/2020 10:45 AM CST    Performed On:  12/29/2020 10:36 AM CST by Dary Pro CMA               Summary   Chief Complaint :   Pt c/o stomach pain lower ab on and off since christmas. She vomited soup last night   Menstrual Status :   Hysterectomy   Weight Measured :   144 lb(Converted to: 144 lb 0 oz, 65.317 kg)    Height Measured :   62.5 in(Converted to: 5 ft 2 in, 158.75 cm)    Body Mass Index :   25.92 kg/m2 (HI)    Body Surface Area :   1.7 m2   Systolic Blood Pressure :   100 mmHg   Diastolic Blood Pressure :   74 mmHg   Mean Arterial Pressure :   83 mmHg   Peripheral Pulse Rate :   87 bpm   BP Site :   Right arm   BP Method :   Manual   Temperature Tympanic :   97.8 DegF(Converted to: 36.6 DegC)  (LOW)    Oxygen Saturation :   98 %   Pain Present :   Yes actual or suspected pain   Intensity :   3    Primary Pain Comments :   annoying pain   Primary Pain Location :   Abdomen lower   Dary Pro CMA - 12/29/2020 10:36 AM CST   Health Status   Allergies Verified? :   Yes   Medication History Verified? :   Yes   Immunizations Current :   Other: Declined TDAP.   Pre-Visit Planning Status :   Completed   Tobacco Use? :   Former smoker   Dary Pro CMA - 12/29/2020 10:36 AM CST   Consents   Consent for Immunization Exchange :   Consent Granted   Consent for Immunizations to Providers :   Consent Granted   Dary Pro CMA - 12/29/2020 10:36 AM CST   Meds / Allergies   (As Of: 12/29/2020 10:45:39 AM CST)   Allergies (Active)   doxycycline  Estimated Onset Date:   Unspecified ; Reactions:   Ill ; Created By:   Nena Edouard CMA; Reaction Status:   Active ; Category:   Drug ; Substance:   doxycycline ; Type:   Allergy ; Updated By:   Nena Edouard CMA; Reviewed Date:   12/29/2020 10:40 AM CST      naproxen  Estimated Onset Date:   Unspecified ; Created By:   Yulisa Ray LPN; Reaction Status:   Active ; Category:   Drug ; Substance:   naproxen ; Type:   Allergy ; Updated  By:   Yulisa Ray LPN; Reviewed Date:   12/29/2020 10:40 AM CST      sulfa drug  Estimated Onset Date:   Unspecified ; Reactions:   Hives ; Created By:   Yulisa Ray LPN; Reaction Status:   Active ; Category:   Drug ; Substance:   sulfa drug ; Type:   Allergy ; Updated By:   Yulisa Ray LPN; Reviewed Date:   12/29/2020 10:40 AM CST        Medication List   (As Of: 12/29/2020 10:45:39 AM CST)   Prescription/Discharge Order    escitalopram  :   escitalopram ; Status:   Prescribed ; Ordered As Mnemonic:   Lexapro 10 mg oral tablet ; Simple Display Line:   10 mg, 1 tab(s), PO, Daily, 90 tab(s), 1 Refill(s) ; Ordering Provider:   Justina Laws MD; Catalog Code:   escitalopram ; Order Dt/Tm:   10/7/2020 11:54:00 AM CDT          levothyroxine  :   levothyroxine ; Status:   Prescribed ; Ordered As Mnemonic:   levothyroxine 88 mcg (0.088 mg) oral tablet ; Simple Display Line:   1 tab(s), Oral, daily, INCREASE., 90 tab(s), 2 Refill(s) ; Ordering Provider:   Justina Laws MD; Catalog Code:   levothyroxine ; Order Dt/Tm:   12/23/2020 4:32:41 PM CST          Miscellaneous Rx Supply  :   Miscellaneous Rx Supply ; Status:   Prescribed ; Ordered As Mnemonic:   Magic mouthwash ; Simple Display Line:   10 ml QID PRN, Oral, qid, 1 Part viscous lidocaine 2%;  1 Part Maalox;  1 Part diphenhydramine 12.5 mg per 5 ml elixir; 120 ml total, 120 mL, 0 Refill(s) ; Ordering Provider:   Jurgen Potts PA-C; Catalog Code:   Miscellaneous Rx Supply ; Order Dt/Tm:   5/29/2020 11:20:46 AM CDT            Home Meds    calcium-vitamin D  :   calcium-vitamin D ; Status:   Documented ; Ordered As Mnemonic:   Calcium 600+D ; Simple Display Line:   2 tab(s), po, daily ; Catalog Code:   calcium-vitamin D ; Order Dt/Tm:   8/4/2014 9:31:21 AM CDT          Miscellaneous Prescription  :   Miscellaneous Prescription ; Status:   Documented ; Ordered As Mnemonic:   Dietary SUpplement-Acemannan ; Simple Display Line:   0 Refill(s) ; Catalog Code:    Miscellaneous Prescription ; Order Dt/Tm:   8/25/2020 10:00:56 AM CDT            ID Risk Screen   Recent Travel History :   No recent travel   Family Member Travel History :   No recent travel   Other Exposure to Infectious Disease :   Unknown   Dary Pro CMA - 12/29/2020 10:36 AM CST   Social History   Social History   (As Of: 12/29/2020 10:45:39 AM CST)   Alcohol:        1 x per month, 1 drinks/episode average.  2 drinks/episode maximum.   (Last Updated: 3/13/2017 3:39:56 PM CDT by Yulisa Ray LPN)          Tobacco:  Past      Former smoker, quit more than 30 days ago   (Last Updated: 12/29/2020 10:42:34 AM CST by Dary Pro CMA)          Electronic Cigarette/Vaping:  Denies Electronic Cigarette Use      Electronic Cigarette Use: Never.   (Last Updated: 12/29/2020 10:43:04 AM CST by Dary Pro CMA)          Home/Environment:        Marital status:  x 2.   (Last Updated: 9/18/2014 9:11:25 AM CDT by Rama Pemberton)          Nutrition/Health:        Type of diet: Regular.   (Last Updated: 10/17/2013 11:40:58 AM CDT by Yulisa Ray LPN)          Exercise:  Regular exercise      (Last Updated: 10/17/2013 11:40:49 AM CDT by Yulisa Ray LPN )

## 2022-02-16 NOTE — PROGRESS NOTES
Patient:   MARLEN ARDON            MRN: [id number]            FIN: [id number]               Age:   51 years     Sex:  Female     :  1970   Associated Diagnoses:   Acute recurrent maxillary sinusitis   Author:   Jurgen Potts PA-C      Report Summary   Diagnosis  Acute recurrent maxillary sinusitis ([id number]-AA J01.01).  Patient InstructionsSummaryOrders   Visit Information      Date of Service: 2021 09:48 am  Performing Location: [facility]  Encounter#: [id number]      Primary Care Provider (PCP):  Justina Laws MD    NPI# [id number]      Referring Provider:  Jurgen Potts PA-C    NPI# [id number]   Visit type:  New symptom.    Source of history:  Self.    Referral source:  Self.    History limitation:  None.       Chief Complaint   2021 10:00 AM CST  Sinus drainage, ear pain and ongoing cough, rib pain/muscle spasms        History of Present Illness             The patient presents with sinus problem.  The sinus problem is located in the maxillary sinus.  The sinus problem is characterized by nasal congestion, rhinorrhea and facial pain.  The severity of the sinus problem is moderate.  The sinus problem is episodic, fluctuates in intensity and is worsening.  The sinus problem has lasted for 5 day(s).  Associated symptoms consist of cough.  CC above noted and confirmed with the patient..  Still struggling with fatigue post C-19 infection.        Review of Systems   Constitutional:  Negative except as documented in history of present illness.    Eye:  Negative.    Ear/Nose/Mouth/Throat:  Negative except as documented in history of present illness.    Respiratory:  Negative except as documented in history of present illness.       Health Status   Allergies:    Allergic Reactions (All)  Severity Not Documented  Doxycycline (Ill)  Naproxen (No reactions were documented)  Sulfa drug (Hives)   Medications:  (Selected)   Prescriptions  Prescribed  Albuterol (Eqv-ProAir HFA) 90  mcg/inh inhalation aerosol: 2 puff(s), Inhale, q6 hrs, # 6.7 gm, 0 Refill(s), Type: Maintenance, Pharmacy: [facility], 2 puff(s) Inhale q6 hrs, 62.5, in, 10/11/21 16:26:00 CDT, Height Measured, 139.5, lb, 10/11/21 16:26:00 CDT, Weight Measured  Flovent  mcg/inh inhalation aerosol: ( 220 mcg ), Inhale, bid, # 1 EA, 0 Refill(s), Type: Maintenance, Pharmacy: [facility], 220 mcg Inhale bid, 62.5, in, 10/27/21 13:14:00 CDT, Height Measured, 140.7, lb, 10/27/21 13:14:00 CDT, Weight Measured  amoxicillin-clavulanate 875 mg-125 mg oral tablet: = 1 tab(s), Oral, q12 hrs, x 10 day(s), # 20 tab(s), 0 Refill(s), Type: Acute, Pharmacy: [facility], 1 tab(s) Oral q12 hrs,x10 day(s), 62.5, in, 11/16/21 10:00:00 CST, Height Measured, 139.8, lb, 11/01/21 13:35:00 CDT, Weight Measured  levothyroxine 88 mcg (0.088 mg) oral tablet: = 1 tab(s), Oral, daily, Instructions: INCREASE., # 90 tab(s), 2 Refill(s), Type: Maintenance, Pharmacy: [facility], TAKE 1 TABLET BY MOUTH ONCE DAILY ( INCREASE ), 62.5, in, 10/07/20 11:31:00 CDT, Height Measured, 149, lb, 10/07/20 11:31:00 CDT,...  Documented Medications  Documented  Calcium 600+D: 2 tab(s), po, daily, 0 Refill(s), Type: Maintenance  Colace 50 mg oral capsule: = 1 cap(s) ( 50 mg ), Oral, bid, 0 Refill(s), Type: Maintenance,    Medications          *denotes recorded medication          Albuterol (Eqv-ProAir HFA) 90 mcg/inh inhalation aerosol: 2 puff(s), Inhale, q6 hrs, 6.7 gm, 0 Refill(s).          amoxicillin-clavulanate 875 mg-125 mg oral tablet: 1 tab(s), Oral, q12 hrs, for 10 day(s), 20 tab(s), 0 Refill(s).          *Calcium 600+D: 2 tab(s), po, daily.          *Colace 50 mg oral capsule: 50 mg, 1 cap(s), Oral, bid, 0 Refill(s).          Flovent  mcg/inh inhalation aerosol: 220 mcg, Inhale, bid, 1 EA, 0 Refill(s).          levothyroxine 88 mcg (0.088 mg) oral tablet: 1 tab(s), Oral, daily, INCREASE., 90 tab(s), 2 Refill(s).       Problem list:    All  Problems  Unspecified abdominal pain / SNOMED CT 94679052 / Confirmed  Ovarian cyst / SNOMED CT [id number]-[id number] / Confirmed  Osteopenia / SNOMED CT 245706084 / Confirmed  Chronic insomnia / SNOMED CT 398120484 / Confirmed  Anxiety Disorder / SNOMED CT 9QC40O86-867S-67B5-[id number] / Confirmed  Acquired hypothyroidism / SNOMED CT 737249308 / Confirmed      Histories   Past Medical History:    Active  Ovarian cyst ([id number])  Comments:  10/17/2013 CDT 11:40 AM DANIELT - Yulisa Ray LPN  Right ovary  Osteopenia (706971817)  Chronic insomnia (661527761)  Anxiety Disorder ([id number])  Acquired hypothyroidism (228717087)  Unspecified abdominal pain (31849346)   Family History:    MI  Father  Diabetes mellitus  Mother  Hypertension  Father     Procedure history:    Colonoscopy (720894803) on 3/26/2021 at 50 Years.  Comments:  4/15/2021 7:59 AM CDT - Rama Pemberton  Indication: Left lower quadrant abdominal pain.   Sedation: Versed, Fentanyl.  Findings: Normal with the exception of severe diverticulosis in descending and sigmoid colon.  Hysterectomy and unilateral salpingo-oophorectomy sample (235734228) on 6/2/2005 at 34 Years.  Comments:  10/17/2013 11:39 AM DANIELT - Yulisa Ray LPN  L ov elisabet removed.   Social History:        Electronic Cigarette/Vaping Assessment: Denies Electronic Cigarette Use            Electronic Cigarette Use: Never.      Alcohol Assessment            1 x per month, 1 drinks/episode average.  2 drinks/episode maximum.      Tobacco Assessment: Past            Former smoker, quit more than 30 days ago      Home and Environment Assessment            Marital status:  x 2.      Nutrition and Health Assessment            Type of diet: Regular.      Exercise and Physical Activity Assessment: Regular exercise        Physical Examination   Vital Signs   11/16/2021 10:00 AM CST Temperature Tympanic 97.0 DegF  LOW     Peripheral Pulse Rate 92 bpm    Pulse Site Radial artery    HR Method Electronic    Systolic Blood Pressure 120 mmHg    Diastolic Blood Pressure 86 mmHg  HI    Mean Arterial Pressure 97 mmHg    BP Site Right arm    BP Method Electronic    Oxygen Saturation 99 %      Measurements from flowsheet : Measurements   11/16/2021 10:00 AM CST  Height Measured - Standard                62.5 in     General:  Alert and oriented, No acute distress.    Eye:  Pupils are equal, round and reactive to light, Extraocular movements are intact, Normal conjunctiva.    HENT:  Normocephalic, Tympanic membranes are clear, Oral mucosa is moist.    Neck:  Supple, Non-tender, No lymphadenopathy.    Respiratory:  Lungs are clear to auscultation, Respirations are non-labored.    Cardiovascular:  Normal rate, Regular rhythm, No murmur.       Impression and Plan   Diagnosis     Acute recurrent maxillary sinusitis (EEN66-IX J01.01).     Patient Instructions:       Counseled: Patient, Regarding diagnosis, Regarding medications, Activity, Verbalized understanding.    Summary:  Take medicine as prescribed, side effects discussed.  Tylenol/ibuprofen for fever and discomfort.  Push fluids.  RTC if not improving in 36-48 hours, prior if concerns as we have discussed.  .    Orders     Orders (Selected)   Prescriptions  Prescribed  amoxicillin-clavulanate 875 mg-125 mg oral tablet: = 1 tab(s), Oral, q12 hrs, x 10 day(s), # 20 tab(s), 0 Refill(s), Type: Acute, Pharmacy: [facility], 1 tab(s) Oral q12 hrs,x10 day(s), 62.5, in, 11/16/21 10:00:00 CST, Height Measured, 139.8, lb, 11/01/21 13:35:00 CDT, Weight Measured.     Take medicine as prescribed, side effects discussed.  Tylenol/ibuprofen for fever and discomfort.  Push fluids.  RTC if not improving in 36-48 hours, prior if concerns as we have discussed.

## 2022-02-16 NOTE — PROGRESS NOTES
Patient:   MARLEN ARDON            MRN: [id number]            FIN: [id number]               Age:   48 years     Sex:  Female     :  1970   Associated Diagnoses:   None   Author:   Jurgen Potts PA-C      Visit Information   Visit type:  General concerns.    Accompanied by:  Significant other.    Source of history:  Self, Significant other, Medical record.    Referral source:  Self.    History limitation:  None.       Chief Complaint   3/15/2019 8:44 AM CDT    Abdominal pains x few days      History of Present Illness             The patient presents with abdominal pain.  The abdominal pain is located in the left lower quadrant.  The abdominal pain is described as aching and cramping.  The severity of the abdominal pain is moderate.  The abdominal pain is episodic, fluctuates in intensity and is worsening.  The abdominal pain has lasted for 3 day(s).  Radiation of pain: none.  Abdominal pain started on Tuesday. Started in epigastric region, now LLQ. Low grade fever. Nausea. No diarrhea. Firmer stool, but having BMS. hurts to void both bowel and bladder. No rash. Has had hysterectomy. No similar episodes. No URI symptoms. No cough. CC above noted and confirmed with the patient..        Review of Systems   Constitutional:  Fever.    Eye:  Negative.    Ear/Nose/Mouth/Throat:  Negative.    Respiratory:  Negative.    Cardiovascular:  Negative.    Gastrointestinal:  Negative except as documented in history of present illness.    Genitourinary:  Negative except as documented in history of present illness.    Gynecologic:  Negative.       Health Status   Allergies:    Allergic Reactions (All)  Severity Not Documented  Doxycycline (Ill)  Naproxen (No reactions were documented)  Sulfa drug (Hives)   Medications:  (Selected)   Prescriptions  Prescribed  levothyroxine 100 mcg (0.1 mg) oral tablet: = 1 tab(s) ( 100 mcg ), PO, Daily, # 90 tab(s), 3 Refill(s), Type: Maintenance, Pharmacy: [id number], 1  tab(s) Oral daily  Documented Medications  Documented  Calcium 600+D: 2 tab(s), po, daily, 0 Refill(s), Type: Maintenance   Problem list:    All Problems  Ovarian cyst / SNOMED CT [id number] / Confirmed  Osteopenia / SNOMED CT 949282432 / Confirmed  Chronic insomnia / SNOMED CT 870258080 / Confirmed  Anxiety Disorder / SNOMED CT 1QH15G07-331B-38B4-[id number] / Confirmed  Acquired hypothyroidism / SNOMED CT 621010781 / Confirmed      Histories   Past Medical History:    Active  Ovarian cyst ([id number])  Comments:  10/17/2013 CDT 11:40 AM DANIELT - Yulisa Ray LPN  Right ovary   Family History:    MI  Father  Diabetes mellitus  Mother  Hypertension  Father     Procedure history:    Hysterectomy and unilateral salpingo-oophorectomy sample (682029354) on 6/2/2005 at 34 Years.  Comments:  10/17/2013 11:39 AM CDT - Yulisa Ray LPN  L ov elisabet removed.   Social History:        Alcohol Assessment            1 x per month, 1 drinks/episode average.  2 drinks/episode maximum.      Tobacco Assessment: Past            Past                     Comments:                      12/30/2014 - Yulisa Ray LPN                     Quit 3 yrs ago.      Home and Environment Assessment            Marital status:  x 2.      Nutrition and Health Assessment            Type of diet: Regular.      Exercise and Physical Activity Assessment: Regular exercise      Physical Examination   Vital Signs   3/15/2019 8:44 AM CDT Temperature Tympanic 99.5 DegF    Peripheral Pulse Rate 110 bpm  HI    Systolic Blood Pressure 104 mmHg    Diastolic Blood Pressure 70 mmHg    Mean Arterial Pressure 81 mmHg    Oxygen Saturation 96 %      Measurements from flowsheet : Measurements   3/15/2019 8:44 AM CDT    Weight Measured - Standard                144.6 lb     Eye:  Normal conjunctiva.    HENT:  Oral mucosa is moist, No pharyngeal erythema.    Neck:  Supple, Non-tender, No lymphadenopathy.     Respiratory:  Lungs are clear to auscultation, Respirations are non-labored, Breath sounds are equal.    Cardiovascular:  Normal rate, Regular rhythm.    Gastrointestinal:  Non-distended, Normal bowel sounds, No organomegaly, LLQ tenderness. No guarding or rebound.       Review / Management   Results review:  Lab results   3/15/2019 9:21 AM CDT Urine Color Dipstick Yellow    Urine Appearance Clear    Urine pH Dipstick 8.5    Urine Specific Gravity Dipstick 1.020    Urine Glucose Dipstick Negative    Urine Bilirubin Dipstick Negative    Urine Ketones Dipstick Negative    Urine Blood Dipstick Negative    Urine Protein Dipstick Negative    Urine Nitrite Dipstick Negative    Urine Leukocyte Esterase Dipstick Negative    Urine Urobilinogen Dipstick 0.2 mg/dl    POC Test Comments POC Test Comments   3/15/2019 9:16 AM CDT WBC TR 9.1 x10^3/uL    RBC TR 4.68 x10^6/uL    Hgb TR 12.9 g/dL    Hct TR 41.3 %    MCV TR 88.2 fL    MCH TR 27.5 pg    MCHC TR 31.2 gm/dL    RDW TR 13.4 %    Platelet  x10^3/uL    Lymphocytes TR 14.4 %    Neutrophils TR 79.3 %    Monocytes TR 6.3 %   .       Impression and Plan   Plan:  CT scan Diverticulitis.    Patient Instructions:       Counseled: Patient, Regarding diagnosis, Regarding treatment, Regarding medications, Activity, Verbalized understanding.    Orders     Orders (Selected)   Outpatient Orders  Ordered  CT Abdomen and Pelvis w/contrast (Request): Priority: STAT, Instructions: IV & ORAL CONTRAST, LLQ abdominal pain  Prescriptions  Prescribed  Cipro 500 mg oral tablet: = 1 tab(s) ( 500 mg ), PO, q12hr, x 7 day(s), # 14 tab(s), 0 Refill(s), Type: Acute, Pharmacy: [facility], 1 tab(s) Oral q12 hrs,x7 day(s)  Flagyl 500 mg oral tablet: = 1 tab(s) ( 500 mg ), PO, q8hr, x 7 day(s), # 21 tab(s), 0 Refill(s), Type: Acute, Pharmacy: [facility], 1 tab(s) Oral q8 hrs,x7 day(s).     Rest, bland diet. RTC or ED for fever, recurrent emesis, increased pain or if not improved in 36  hours.

## 2022-02-16 NOTE — LETTER
(Inserted Image. Unable to display)   [address] [place] [address]  July 14, 2020      MARLEN ARDON      [address] [facility] [address] [place] [phone]      Dear MARLEN BETH,    Thank you for selecting [facility] for your healthcare needs. Below you will find the results of the recent tests done at our clinic.    Your labs are listed below. TSH is at upper limits. I would recommend that we increase your levothyroxine to 88mcg and then have you follow up in 3 months for labs. If that comes back down closer to 1 and the swelling in your neck improves, will continue on the 88mcg dose.  Let me know if you have questions. I also sent this to your patient portal.    Result Name Current Result Previous Result Reference Range   Glucose Level (mg/dL)  91 7/13/2020  65 - 99   Cholesterol (mg/dL) ((H)) 239 7/13/2020   - <200   Non-HDL Cholesterol ((H)) 158 7/13/2020   - <130   HDL (mg/dL)  81 7/13/2020  > OR = 50 -    Cholesterol/HDL Ratio  3.0 7/13/2020   - <5.0   LDL ((H)) 140 7/13/2020     Triglyceride (mg/dL)  83 7/13/2020   - <150   TSH (mIU/L)  3.93 7/13/2020 ((L)) 0.16 7/15/2019   3.97 9/6/2018        Please contact me or my assistant at [phone] if you have any questions or concerns.     Sincerely,        Justina Laws M.D.

## 2022-02-16 NOTE — TELEPHONE ENCOUNTER
---------------------  From: Geeta Lee CMA ([employer] ([id number]_[place] - [employer])   To: Justina Laws MD;     Sent: 2/13/2019 10:10:46 AM CST  Subject: phone note- work note     Phone Message    PCP:    CHILANGO      Time of Call:  10am       Person Calling:  Martina Keane  Phone number:  [phone]    Returned call at: 10:05am    Note:  Patient called stating she has been out of work this week: Mon, Tues and today, due to vomiting, diarrhea and chills. Patient states she is feeling better today, just c/o fatigue. Wondering if she can get a note for work? Needs to be excused for those 3 days and have ok to return to work tomorrow. Please advise.  Currently working at [employer] in [place], [place].    Last office visit and reason:  9/6/18Letter printed.---------------------  From: Justina Laws MD   To: [employer] ([id number]_[place] - [employer]);     Sent: 2/13/2019 10:25:54 AM CST  Subject: RE: phone note- work noteTime: 10:31am  Note:  Notified patient that CHILANGO wrote a note and will be a  for . Patient states Rio Mackey will pick it up.

## 2022-02-16 NOTE — NURSING NOTE
Depression Screening Entered On:  10/7/2020 2:26 PM CDT    Performed On:  10/7/2020 2:26 PM CDT by Dary Pro CMA               Depression Screening   Little Interest - Pleasure in Activities :   More than half the days   Feeling Down, Depressed, Hopeless :   More than half the days   Initial Depression Screen Score :   4 Score   Poor Appetite or Overeating :   More than half the days   Trouble Falling or Staying Asleep :   More than half the days   Feeling Tired or Little Energy :   Nearly every day   Feeling Bad About Yourself :   Not at all   Trouble Concentrating :   Nearly every day   Moving or Speaking Slowly :   Several days   Thoughts Better Off Dead or Hurting Self :   Not at all   HOANG Difficulty with Work, Home, Others :   Somewhat difficult   Detailed Depression Screen Score :   11    Total Depression Screen Score :   15    Dary Pro CMA - 10/7/2020 2:26 PM CDT

## 2022-02-16 NOTE — TELEPHONE ENCOUNTER
---------------------  From: Rosi Zimmerman LPN (Phone Messages Pool ([id number]_[facility]))   To: [facility] Message Pool ([id number]_[facility]);     Sent: 11/17/2021 12:58:33 PM CST  Subject: work note- request for correction     Phone Message    PCP:   CHILANGO asked for KRYSTIN      Time of Call:  12:55pm       Person Calling:  Pt  Phone number:  [phone]    Note:   Pt called stating she stopped in and picked up her work note. Pt says it is incorrect. Pt says the note that she  was given says to excuse her from work from 11/15-11/19.    Pt says she was just suppose to be excused from work 11/15-11/17 and wanted to return to 4 hour days starting 11/18. Pt says KRYSTIN had her on 4 hour days because of her lung. Pt would like a new note extending the 4 hour work days starting 11/18.    Please advise.    Last office visit and reason:  11/16/21 Sinus Problems---------------------  From: Yvon Clinton LPN ([employer] Message Pool ([id number]_[facility]))   To: Jurgen Potts PA-C;     Sent: 11/17/2021 1:23:52 PM CST  Subject: FW: work note- request for correction---------------------  From: Jurgen Potts PA-C   To: [employer] ([id number]_[facility]);     Sent: 11/17/2021 1:40:45 PM CST  Subject: RE: work note- request for correction     See notes on your desk    [place] message that 2 letters were rewritten and placed at the . Please have her read both letters while in clinic in case they are not accurate.

## 2022-02-16 NOTE — PROGRESS NOTES
Chief Complaint    F/u sore throat. Also c/o left ankle pain.  History of Present Illness      Chief complaint as above reviewed and confirmed with patient.  Pt presents to the clinic with concerns re: L ankle wound as well as ongoing ST.       1.  ankle wound:  was waking the dogs this weekend 2 days ago, when one rapidly moved winding her up in the dog leash at the ankle resulting in a superficial wound to the L ankle with increased redness, tenderness and swelling.  She has keeping clean and dry with soap and water.       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       2. ST: she was eating chips about 9-10 days ago when she had an event where one got stuck and she had immediate pain on the R throat.  It eventually went down but was painful, woke the next am and pain was worse.  Tylenol not helpful.  Magic mouth wash is some help but she has a hard time getting it deep enough to relieve pain as she is doing swish and spit.  She is eating soft food without difficulty other than discomfort.   Liquids go down well, but painful.  No vomiting, no blood.       Thyroid medication was adjusted down about 6 weeks ago, as she felt over medicated. Due for TSH next month.  she is not interested in checking it today since she is in the clinic because she does not want to get different dosage of medication since she has 3 month supply at her new dose.  Review of Systems      Review of systems is negative with the exception of those noted in HPI          Physical Exam   Vitals & Measurements    T: 97.9   F (Tympanic)  HR: 80(Peripheral)  BP: 98/54     HT: 62.5 in       nad appears well      exam of the throat reveals no erythema, swelling, tonsils non hypertrophied, no exudate, no oral lesions.       neck supple, no lymphadenopathy present.  Thyroid diffusely mildly enlarged, I am unable to palpate any nodules.       L leg exam reveals a 6 cm linear abrasion lateral ankle just  above the lateral malleolus.  IT is TTP here. minimal swelling surrounding the wound.       full AROM At the ankle.  Sensation and peripheral pulses intact, cap refill brisk.  Assessment/Plan       1. Infected abrasion (T14.8XXA)         keflex as ordered.  keep clean and dry.  elevation.  note for work to allow abx and elevation, when returns should keep covered if in dirty environment.                2. Pharyngitis (J02.9)         her sx started immediatly after eating the chip, likely a mild mucosal abrasion.  She wonders if it could be other causes like a fish shell stuck or that could have scraped as she had eaten some sushi but she does not recall an event.  THis is less likely, and not likely a FB given she is able to continue to eat soft solids without difficulty.  REcommend continue Magic mouthwash for the week.  If not resolving consider UGI and or upper endoscopy if that is normal and sx persist.  Pt agreeable.  less likely due to her thyromegaly as it mild and she clearly had an event and sx on the R side only, no palpable neck mass.  Consider US of the thyroid and repeat TSH as well if not resolving. she is not interested in doing her TSH Today but will plan in July as discussed with Dr. Laws                 Orders:         cephalexin, = 1 cap(s) ( 500 mg ), Oral, qid, x 7 day(s), # 28 cap(s), 0 Refill(s), Type: Acute, Pharmacy: [facility], 1 cap(s) Oral qid,x7 day(s), 62.5, in, 06/01/20 10:41:00 CDT, Height Measured, 154, lb, 05/29/20 11:08:00 CDT, Weight Measured, (Ordered)  Patient Information     Name:MARLEN ARDON      Address:      [address] [place] [phone]     Sex:Female     YOB: 1970     [phone]     Emergency Contact:TEJAL NAVA     MRN:[id number]     FIN:[id number]     Location:[facility]     Date of Service:06/01/2020      Primary Care Physician:       Veto GASPAR [facility], [phone]      Attending  Physician:       Harleen CASTRO, Rakel LOVELACE, [phone]  Problem List/Past Medical History    Ongoing     Acquired hypothyroidism     Anxiety Disorder     Chronic insomnia     Osteopenia     Ovarian cyst       Comments: Right ovary    Historical     No qualifying data  Procedure/Surgical History     Hysterectomy and unilateral salpingo-oophorectomy sample (06/02/2005)      Comments: L ov elisabet removed..  Medications    Calcium 600+D, 2 tab(s), Oral, daily    Keflex 500 mg oral capsule, 500 mg= 1 cap(s), Oral, qid    levothyroxine 75 mcg (0.075 mg) oral tablet, 75 mcg= 1 tab(s), Oral, daily, 1 refills    Magic mouthwash, 10 ml QID PRN, Oral, qid  Allergies    doxycycline (Ill)    naproxen    sulfa drug (Hives)  Social History    Smoking Status - 05/29/2020     Former smoker     Alcohol      1 x per month, 1 drinks/episode average. 2 drinks/episode maximum., 03/13/2017     Exercise - Regular exercise, 10/17/2013     Home/Environment      Marital status:  x 2., 09/18/2014     Nutrition/Health      Type of diet: Regular., 10/17/2013     Tobacco - Past, 10/17/2013      Past, 12/30/2014  Family History    Diabetes mellitus: Mother.    Hypertension: Father.    MI: Father.  Immunizations      Vaccine Date Status          human papillomavirus vaccine 09/04/2008 Recorded          human papillomavirus vaccine 04/17/2008 Recorded          human papillomavirus vaccine 09/22/2006 Recorded          Td 08/25/2004 Recorded

## 2022-02-16 NOTE — TELEPHONE ENCOUNTER
Entered by Nena Edouard CMA on February 05, 2021 7:03:29 AM CST  ---------------------  From: Nena Edouard CMA   To: [facility]    Sent: 2/5/2021 7:03:29 AM CST  Subject: Medication Management     ** Not Approved: Patient has requested refill too soon, #21 filled 2/3/21 **  valACYclovir (VALACYCLOVIR HCL 1 GM TABS 1 Tablet)  TAKE 1 TABLET BY MOUTH EVERY EIGHT HOURS FOR 7 DAYS  Qty:  21 tab(s)        Days Supply:  7        Refills:  0          Substitutions Allowed     Route To Pharmacy - [facility]   Signed by Nena Edouard CMA            ------------------------------------------  From: [facility]  To: Justina Laws MD  Sent: February 4, 2021 3:04:23 PM CST  Subject: Medication Management  Due: January 30, 2021 4:21:54 PM CST     ** On Hold Pending Signature **     Drug: valACYclovir (Valtrex 1 g oral tablet), 1 tab(s) Oral q8 hrs,x7 day(s)  Quantity: 21 tab(s)  Days Supply: 0  Refills: 0  Substitutions Allowed  Notes from Pharmacy:     Dispensed Drug: valACYclovir (valACYclovir 1 g oral tablet), TAKE 1 TABLET BY MOUTH EVERY EIGHT HOURS FOR 7 DAYS  Quantity: 21 tab(s)  Days Supply: 7  Refills: 0  Substitutions Allowed  Notes from Pharmacy:  ------------------------------------------

## 2022-02-16 NOTE — PROGRESS NOTES
Patient:   MARLEN ARDON            MRN: [id number]            FIN: [id number]               Age:   51 years     Sex:  Female     :  1970   Associated Diagnoses:   COVID-19 virus infection   Author:   Jurgen Potts PA-C      Visit Information      Date of Service: 10/11/2021 04:18 pm  Performing Location: [facility]  Encounter#: [id number]      Chief Complaint   10/11/2021 4:26 PM CDT   f/u COVID, return to work - still fatigued and SOB        Subjective   Chief complaint 10/11/2021 4:26 PM CDT   f/u COVID, return to work - still fatigued and SOB  .     Covid infection. Taste and smell coming back. Works long hours at [employer]. Wonders about going back part days. Some cough. Wheeze occ. No fevers. Sleeps propped up.      Health Status   Allergies:    Allergic Reactions (Selected)  Severity Not Documented  Doxycycline (Ill)  Naproxen (No reactions were documented)  Sulfa drug (Hives)   Medications:  (Selected)   Prescriptions  Prescribed  levothyroxine 88 mcg (0.088 mg) oral tablet: = 1 tab(s), Oral, daily, Instructions: INCREASE., # 90 tab(s), 2 Refill(s), Type: Maintenance, Pharmacy: [facility], TAKE 1 TABLET BY MOUTH ONCE DAILY ( INCREASE ), 62.5, in, 10/07/20 11:31:00 CDT, Height Measured, 149, lb, 10/07/20 11:31:00 CDT,...  Documented Medications  Documented  Calcium 600+D: 2 tab(s), po, daily, 0 Refill(s), Type: Maintenance  Colace 50 mg oral capsule: = 1 cap(s) ( 50 mg ), Oral, bid, 0 Refill(s), Type: Maintenance,    Medications          *denotes recorded medication          *Calcium 600+D: 2 tab(s), po, daily.          *Colace 50 mg oral capsule: 50 mg, 1 cap(s), Oral, bid, 0 Refill(s).          levothyroxine 88 mcg (0.088 mg) oral tablet: 1 tab(s), Oral, daily, INCREASE., 90 tab(s), 2 Refill(s).       Problem list:    All Problems  Unspecified abdominal pain / SNOMED CT 81827447 / Confirmed  Ovarian cyst / SNOMED CT [id number]-[id number] /  Confirmed  Osteopenia / SNOMED CT 497957035 / Confirmed  Chronic insomnia / SNOMED CT 099799340 / Confirmed  Anxiety Disorder / SNOMED CT [id number] / Confirmed  Acquired hypothyroidism / SNOMED CT 277162983 / Confirmed      Objective   Vital Signs   10/11/2021 4:26 PM CDT Peripheral Pulse Rate 69 bpm    HR Method Electronic    Systolic Blood Pressure 127 mmHg    Diastolic Blood Pressure 85 mmHg  HI    Mean Arterial Pressure 99 mmHg    BP Site Right arm    BP Method Electronic    Oxygen Saturation 99 %      Measurements from flowsheet : Measurements   10/11/2021 4:26 PM CDT Height Measured - Standard 62.5 in    Weight Measured - Standard 139.5 lb    BSA 1.67 m2    Body Mass Index 25.11 kg/m2  HI      Respiratory:  Lungs are clear to auscultation, Respirations are non-labored.    Cardiovascular:  Normal rate, Regular rhythm.       Results Review   Results review   Lab results: 9/29/2021 2:20 PM CDT    Coronavirus SARS-CoV-2 (COVID-19) TR      Positive        Impression and Plan   Assessment and Plan:          Diagnosis: COVID-19 virus infection (ALI11-PE U07.1).         Course: Improving.    Orders     Orders   Pharmacy:  Albuterol (Eqv-ProAir HFA) 90 mcg/inh inhalation aerosol (Prescribe): 2 puff(s), Inhale, q6 hrs, # 6.7 gm, 0 Refill(s), Type: Maintenance, Pharmacy: [facility], 2 puff(s) Inhale q6 hrs, 62.5, in, 10/11/2021 4:26 PM CDT, Height Measured, 139.5, lb, 10/11/2021 4:26 PM CDT, Weight Measured.     .    See work note for working 4 hours per day. FU Oct 22 and PRN. This can be virtual if she wants.

## 2022-02-16 NOTE — NURSING NOTE
Comprehensive Intake Entered On:  12/6/2021 11:10 AM CST    Performed On:  12/6/2021 11:02 AM CST by Yvon Clinton LPN               Summary   Menstrual Status :   Hysterectomy   Weight Measured :   142 lb(Converted to: 142 lb 0 oz, 64.410 kg)    Height Measured :   62.5 in(Converted to: 5 ft 2 in, 158.75 cm)    Body Mass Index :   25.56 kg/m2 (HI)    Body Surface Area :   1.68 m2   Systolic Blood Pressure :   114 mmHg   Diastolic Blood Pressure :   76 mmHg   Mean Arterial Pressure :   89 mmHg   Apical Heart Rate :   100 bpm   BP Site :   Right arm   Pulse Site :   Radial artery   BP Method :   Manual   HR Method :   Manual   Temperature Oral :   97.6 DegF(Converted to: 36.4 DegC)    Oxygen Saturation :   99 %   Yvon Clinton LPN - 12/6/2021 11:02 AM CST   Consents   Consent for Immunization Exchange :   Consent Granted   Consent for Immunizations to Providers :   Consent Granted   Yvon Clinton LPN - 12/6/2021 11:02 AM CST   Meds / Allergies   (As Of: 12/6/2021 11:10:12 AM CST)   Allergies (Active)   doxycycline  Estimated Onset Date:   Unspecified ; Reactions:   Ill ; Created By:   Nena Edouard CMA; Reaction Status:   Active ; Category:   Drug ; Substance:   doxycycline ; Type:   Allergy ; Updated By:   Nena Edouard CMA; Reviewed Date:   12/6/2021 11:03 AM CST      naproxen  Estimated Onset Date:   Unspecified ; Created By:   Yulisa Ray LPN; Reaction Status:   Active ; Category:   Drug ; Substance:   naproxen ; Type:   Allergy ; Updated By:   Yulisa Ray LPN; Reviewed Date:   12/6/2021 11:03 AM CST      sulfa drug  Estimated Onset Date:   Unspecified ; Reactions:   Hives ; Created By:   Yulisa Ray LPN; Reaction Status:   Active ; Category:   Drug ; Substance:   sulfa drug ; Type:   Allergy ; Updated By:   Yulisa Ray LPN; Reviewed Date:   12/6/2021 11:03 AM CST        Medication List   (As Of: 12/6/2021 11:10:12 AM CST)   Prescription/Discharge Order    albuterol  :   albuterol ; Status:    Prescribed ; Ordered As Mnemonic:   Albuterol (Eqv-ProAir HFA) 90 mcg/inh inhalation aerosol ; Simple Display Line:   2 puff(s), Inhale, q6 hrs, 6.7 gm, 0 Refill(s) ; Ordering Provider:   Jurgen Potts PA-C; Catalog Code:   albuterol ; Order Dt/Tm:   10/11/2021 4:46:00 PM CDT          fluticasone  :   fluticasone ; Status:   Prescribed ; Ordered As Mnemonic:   Flovent  mcg/inh inhalation aerosol ; Simple Display Line:   220 mcg, Inhale, bid, 1 EA, 0 Refill(s) ; Ordering Provider:   Justina Laws MD; Catalog Code:   fluticasone ; Order Dt/Tm:   10/27/2021 1:33:36 PM CDT          levothyroxine  :   levothyroxine ; Status:   Prescribed ; Ordered As Mnemonic:   levothyroxine 88 mcg (0.088 mg) oral tablet ; Simple Display Line:   1 tab(s), Oral, daily, INCREASE., 90 tab(s), 2 Refill(s) ; Ordering Provider:   Justina Laws MD; Catalog Code:   levothyroxine ; Order Dt/Tm:   12/23/2020 4:32:41 PM CST            Home Meds    calcium-vitamin D  :   calcium-vitamin D ; Status:   Documented ; Ordered As Mnemonic:   Calcium 600+D ; Simple Display Line:   2 tab(s), po, daily ; Catalog Code:   calcium-vitamin D ; Order Dt/Tm:   8/4/2014 9:31:21 AM CDT          docusate  :   docusate ; Status:   Documented ; Ordered As Mnemonic:   Colace 50 mg oral capsule ; Simple Display Line:   50 mg, 1 cap(s), Oral, bid, 0 Refill(s) ; Catalog Code:   docusate ; Order Dt/Tm:   6/23/2021 1:39:09 PM CDT            Social History   Social History   (As Of: 12/6/2021 11:10:12 AM CST)   Alcohol:        1 x per month, 1 drinks/episode average.  2 drinks/episode maximum.   (Last Updated: 3/13/2017 3:39:56 PM CDT by Yulisa Ray LPN)          Tobacco:  Past      Former smoker, quit more than 30 days ago   (Last Updated: 12/29/2020 10:42:34 AM CST by Dary Pro CMA)          Electronic Cigarette/Vaping:  Denies Electronic Cigarette Use      Electronic Cigarette Use: Never.   (Last Updated: 12/29/2020 10:43:04 AM CST by Inocencia APPLE  Anita          Home/Environment:        Marital status:  x 2.   (Last Updated: 9/18/2014 9:11:25 AM CDT by Rama Pemberton)          Nutrition/Health:        Type of diet: Regular.   (Last Updated: 10/17/2013 11:40:58 AM CDT by Yulisa Ray LPN)          Exercise:  Regular exercise      (Last Updated: 10/17/2013 11:40:49 AM CDT by Yulisa Ray LPN )

## 2022-02-16 NOTE — PROGRESS NOTES
Chief Complaint    Sores and pain on both elbows x5 days.  History of Present Illness       Patient has a sore lesion on both elbows.       She remembers putting her elbows on outdoor table that had been washed in something.  The became sore and now she has some small ulcers.  It hurts if she puts her elbows down so she would like some therapy or treatment  Review of Systems       No fever, no chills  Physical Exam   Vitals & Measurements    T: 98.8  F (Tympanic)  HR: 102 (Peripheral)  BP: 116/74  SpO2: 98%     HT: 62.5 in  WT: 140.5 lb  BMI: 25.29        On the olecranon process bilaterally there are approximately 5 mm superficial ulcers no surrounding erythema no joint swelling  Assessment/Plan       1. Dermatitis (L30.9)          Dermatitis very likely elbows were sitting on some cleaning chemical and reactive.  She should wash with soap and water and I have given her steroid cream to apply expect this to heal over the next 2 weeks       Orders:         fluocinolone topical, See Instructions, Instructions: Topical tid up to 3 weeks, # 15 gm, 0 Refill(s), Type: Acute, Pharmacy: [facility], Topical tid up to 3 weeks, 62.5, in, 07/13/21 10:46:00 CDT, Height Measured, 140.5, lb, 07/13/21 10:46:00 CDT, Weight Measured, (Ordered)  Patient Information     Name:MARLEN ARDON      Address:      [address] [place] [phone]     Sex:Female     YOB: 1970     [phone]     Emergency Contact:TEJAL NAVA     MRN:[id number]     FIN:[id number]     Location:[facility]     Date of Service:07/13/2021      Primary Care Physician:       Justina Laws MD, [phone]      Attending Physician:       Bradford Hameed MD, [phone]  Problem List/Past Medical History    Ongoing     Acquired hypothyroidism     Anxiety Disorder     Chronic insomnia     Osteopenia     Ovarian cyst       Comments: Right ovary     Unspecified abdominal pain    Historical     No  qualifying data  Procedure/Surgical History     Colonoscopy (03/26/2021)      Comments: Indication: Left lower quadrant abdominal pain.      Sedation: Versed, Fentanyl.      Findings: Normal with the exception of severe diverticulosis in descending and sigmoid colon..     Hysterectomy and unilateral salpingo-oophorectomy sample (06/02/2005)      Comments: L ov elisabet removed..  Medications    Calcium 600+D, 2 tab(s), Oral, daily    Colace 50 mg oral capsule, 50 mg= 1 cap(s), Oral, bid    escitalopram 10 mg oral tablet, 1 tab(s), Oral, daily    fluocinolone 0.01% topical cream, See Instructions    levothyroxine 88 mcg (0.088 mg) oral tablet, 1 tab(s), Oral, daily  Allergies    doxycycline (Ill)    naproxen    sulfa drug (Hives)  Social History    Smoking Status     Former smoker     Alcohol      1 x per month, 1 drinks/episode average. 2 drinks/episode maximum.     Electronic Cigarette/Vaping - Denies Electronic Cigarette Use      Electronic Cigarette Use: Never.     Exercise - Regular exercise     Home/Environment      Marital status:  x 2.     Nutrition/Health      Type of diet: Regular.     Tobacco - Past      Former smoker, quit more than 30 days ago  Family History    Diabetes mellitus: Mother.    Hypertension: Father.    MI: Father.  Lab Results          Lab Results (Last 4 results within 90 days)           UA Color: YELLOW (06/23/21 14:50:00)          UA Appear: CLEAR (06/23/21 14:50:00)          UA pH: 5.5 [5  - 8] (06/23/21 14:50:00)          UA Specific Gravity: 1.017 [1.001  - 1.035] (06/23/21 14:50:00)          UA Glucose: NEGATIVE [NEGATIVE  - NEGATIVE] (06/23/21 14:50:00)          UA Bilirubin: NEGATIVE [NEGATIVE  - NEGATIVE] (06/23/21 14:50:00)          UA Ketones: NEGATIVE [NEGATIVE  - NEGATIVE] (06/23/21 14:50:00)          UA Blood: NEGATIVE [NEGATIVE  - NEGATIVE] (06/23/21 14:50:00)          UA Protein: NEGATIVE [NEGATIVE  - NEGATIVE] (06/23/21 14:50:00)          UA Nitrite: NEGATIVE [NEGATIVE   - NEGATIVE] (06/23/21 14:50:00)          UA Leukocyte Esterase: NEGATIVE [NEGATIVE  - NEGATIVE] (06/23/21 14:50:00)          Urine Culture: See comment (06/23/21 14:50:00)  Immunizations          Scheduled Immunizations          Dose Date(s)          human papillomavirus vaccine          09/22/2006, 04/17/2008, 09/04/2008          Td          08/25/2004

## 2022-02-16 NOTE — LETTER
(Inserted Image. Unable to display)   [address] [facility] [address]  [phone] (phone) [phone] (fax)  June 24, 2021        MARLEN ARDON  [address] [place] [phone]      Dear MARLEN BETH,      [facility]      Radiology read your xray as normal - no evidence of any fractures or dislocation.       Please contact me or my assistant at [phone] if you have any questions or concerns.     Sincerely,      Justina Laws MD

## 2022-02-16 NOTE — NURSING NOTE
Comprehensive Intake Entered On:  12/17/2021 12:42 PM CST    Performed On:  12/17/2021 12:37 PM CST by Geeta Dunaway LPN               Summary   Chief Complaint :   release back to work forms.    Menstrual Status :   Hysterectomy   Weight Measured :   144 lb(Converted to: 144 lb 0 oz, 65.317 kg)    Height Measured :   62.5 in(Converted to: 5 ft 2 in, 158.75 cm)    Body Mass Index :   25.92 kg/m2 (HI)    Body Surface Area :   1.7 m2   Systolic Blood Pressure :   124 mmHg   Diastolic Blood Pressure :   64 mmHg   Mean Arterial Pressure :   84 mmHg   Peripheral Pulse Rate :   86 bpm   BP Site :   Right arm   Pulse Site :   Radial artery   BP Method :   Manual   HR Method :   Manual   Temperature Tympanic :   97.8 DegF(Converted to: 36.6 DegC)  (LOW)    Geeta Dunaway LPN - 12/17/2021 12:37 PM CST   Health Status   Allergies Verified? :   Yes   Medication History Verified? :   Yes   Immunizations Current :   Other: Declined TDAP.   Pre-Visit Planning Status :   Completed   Tobacco Use? :   Former smoker   Geeta Dunaway LPN - 12/17/2021 12:37 PM CST   Consents   Consent for Immunization Exchange :   Consent Granted   Consent for Immunizations to Providers :   Consent Granted   Geeta Dunaway LPN - 12/17/2021 12:37 PM CST   Meds / Allergies   (As Of: 12/17/2021 12:42:11 PM CST)   Allergies (Active)   doxycycline  Estimated Onset Date:   Unspecified ; Reactions:   Ill ; Created By:   Nena Edouard CMA; Reaction Status:   Active ; Category:   Drug ; Substance:   doxycycline ; Type:   Allergy ; Updated By:   Nena Edouard CMA; Reviewed Date:   12/6/2021 11:14 AM CST      naproxen  Estimated Onset Date:   Unspecified ; Created By:   Yulisa Ray LPN; Reaction Status:   Active ; Category:   Drug ; Substance:   naproxen ; Type:   Allergy ; Updated By:   Yulisa Ray LPN; Reviewed Date:   12/6/2021 11:14 AM CST      sulfa drug  Estimated Onset Date:   Unspecified ; Reactions:   Hives ; Created By:    Yulisa Ray LPN; Reaction Status:   Active ; Category:   Drug ; Substance:   sulfa drug ; Type:   Allergy ; Updated By:   Yulisa Ray LPN; Reviewed Date:   12/6/2021 11:14 AM CST        Medication List   (As Of: 12/17/2021 12:42:11 PM CST)   Prescription/Discharge Order    albuterol  :   albuterol ; Status:   Prescribed ; Ordered As Mnemonic:   Albuterol (Eqv-ProAir HFA) 90 mcg/inh inhalation aerosol ; Simple Display Line:   2 puff(s), Inhale, q6 hrs, 6.7 gm, 0 Refill(s) ; Ordering Provider:   Jurgen Potts PA-C; Catalog Code:   albuterol ; Order Dt/Tm:   10/11/2021 4:46:00 PM CDT          azithromycin  :   azithromycin ; Status:   Processing ; Ordered As Mnemonic:   Zithromax 250 mg oral tablet ; Ordering Provider:   Justina Laws MD; Action Display:   Complete ; Catalog Code:   azithromycin ; Order Dt/Tm:   12/17/2021 12:41:07 PM CST          fluticasone  :   fluticasone ; Status:   Prescribed ; Ordered As Mnemonic:   Flovent  mcg/inh inhalation aerosol ; Simple Display Line:   220 mcg, Inhale, bid, 1 EA, 0 Refill(s) ; Ordering Provider:   Justina Laws MD; Catalog Code:   fluticasone ; Order Dt/Tm:   10/27/2021 1:33:36 PM CDT          levothyroxine  :   levothyroxine ; Status:   Prescribed ; Ordered As Mnemonic:   levothyroxine 88 mcg (0.088 mg) oral tablet ; Simple Display Line:   1 tab(s), Oral, daily, INCREASE., 90 tab(s), 2 Refill(s) ; Ordering Provider:   Justina Laws MD; Catalog Code:   levothyroxine ; Order Dt/Tm:   12/23/2020 4:32:41 PM CST          predniSONE  :   predniSONE ; Status:   Prescribed ; Ordered As Mnemonic:   predniSONE 10 mg oral tablet ; Simple Display Line:   as directed, PO, Daily, 4 tablets for 4 days then 3 tablets for 4 days then 2 tablets for 4 days then 1 tablet for 4 days, 40 tab(s), 0 Refill(s) ; Ordering Provider:   Justina Laws MD; Catalog Code:   predniSONE ; Order Dt/Tm:   12/6/2021 11:25:00 AM [facility]            Home Meds    calcium-vitamin D  :    calcium-vitamin D ; Status:   Documented ; Ordered As Mnemonic:   Calcium 600+D ; Simple Display Line:   2 tab(s), po, daily ; Catalog Code:   calcium-vitamin D ; Order Dt/Tm:   8/4/2014 9:31:21 AM CDT          docusate  :   docusate ; Status:   Documented ; Ordered As Mnemonic:   Colace 50 mg oral capsule ; Simple Display Line:   50 mg, 1 cap(s), Oral, bid, 0 Refill(s) ; Catalog Code:   docusate ; Order Dt/Tm:   6/23/2021 1:39:09 PM CDT

## 2022-02-16 NOTE — NURSING NOTE
Comprehensive Intake Entered On:  11/24/2021 4:46 PM CST    Performed On:  11/24/2021 4:34 PM CST by Melisa Montalvo CMA               Summary   Chief Complaint :   Follow up ER 11/23/21 dyspnea and post COVID fatigue, headache, needing ppw completed for work    Menstrual Status :   Hysterectomy   Weight Measured :   142 lb(Converted to: 142 lb 0 oz, 64.410 kg)    Height Measured :   62.5 in(Converted to: 5 ft 2 in, 158.75 cm)    Body Mass Index :   25.56 kg/m2 (HI)    Body Surface Area :   1.68 m2   Systolic Blood Pressure :   120 mmHg   Diastolic Blood Pressure :   80 mmHg   Mean Arterial Pressure :   93 mmHg   Peripheral Pulse Rate :   88 bpm   BP Site :   Right arm   Pulse Site :   Radial artery   BP Method :   Manual   HR Method :   Electronic   Temperature Tympanic :   98.5 DegF(Converted to: 36.9 DegC)    Oxygen Saturation :   98 %   Melisa Montalvo CMA - 11/24/2021 4:34 PM CST   Health Status   Allergies Verified? :   Yes   Medication History Verified? :   Yes   Immunizations Current :   Other: Declined TDAP.   Medical History Verified? :   No   Pre-Visit Planning Status :   Not completed   Tobacco Use? :   Former smoker   Melisa Montalvo CMA - 11/24/2021 4:34 PM CST   Consents   Consent for Immunization Exchange :   Consent Granted   Consent for Immunizations to Providers :   Consent Granted   Melisa Montalvo CMA - 11/24/2021 4:34 PM CST   Meds / Allergies   (As Of: 11/24/2021 4:46:54 PM CST)   Allergies (Active)   doxycycline  Estimated Onset Date:   Unspecified ; Reactions:   Ill ; Created By:   Nena Edouard CMA; Reaction Status:   Active ; Category:   Drug ; Substance:   doxycycline ; Type:   Allergy ; Updated By:   Nena Edouard CMA; Reviewed Date:   11/24/2021 4:44 PM CST      naproxen  Estimated Onset Date:   Unspecified ; Created By:   Yulisa Ray LPN; Reaction Status:   Active ; Category:   Drug ; Substance:   naproxen ; Type:   Allergy ; Updated By:   Yulisa Ray LPN; Reviewed Date:   11/24/2021  4:44 PM CST      sulfa drug  Estimated Onset Date:   Unspecified ; Reactions:   Hives ; Created By:   Yulisa Ray LPN; Reaction Status:   Active ; Category:   Drug ; Substance:   sulfa drug ; Type:   Allergy ; Updated By:   Yulisa Ray LPN; Reviewed Date:   11/24/2021 4:44 PM CST        Medication List   (As Of: 11/24/2021 4:46:54 PM CST)   Prescription/Discharge Order    albuterol  :   albuterol ; Status:   Prescribed ; Ordered As Mnemonic:   Albuterol (Eqv-ProAir HFA) 90 mcg/inh inhalation aerosol ; Simple Display Line:   2 puff(s), Inhale, q6 hrs, 6.7 gm, 0 Refill(s) ; Ordering Provider:   Jurgen Potts PA-C; Catalog Code:   albuterol ; Order Dt/Tm:   10/11/2021 4:46:00 PM CDT          fluticasone  :   fluticasone ; Status:   Prescribed ; Ordered As Mnemonic:   Flovent  mcg/inh inhalation aerosol ; Simple Display Line:   220 mcg, Inhale, bid, 1 EA, 0 Refill(s) ; Ordering Provider:   Justina Laws MD; Catalog Code:   fluticasone ; Order Dt/Tm:   10/27/2021 1:33:36 PM CDT          levothyroxine  :   levothyroxine ; Status:   Prescribed ; Ordered As Mnemonic:   levothyroxine 88 mcg (0.088 mg) oral tablet ; Simple Display Line:   1 tab(s), Oral, daily, INCREASE., 90 tab(s), 2 Refill(s) ; Ordering Provider:   Justina Laws MD; Catalog Code:   levothyroxine ; Order Dt/Tm:   12/23/2020 4:32:41 PM CST            Home Meds    calcium-vitamin D  :   calcium-vitamin D ; Status:   Documented ; Ordered As Mnemonic:   Calcium 600+D ; Simple Display Line:   2 tab(s), po, daily ; Catalog Code:   calcium-vitamin D ; Order Dt/Tm:   8/4/2014 9:31:21 AM CDT          docusate  :   docusate ; Status:   Documented ; Ordered As Mnemonic:   Colace 50 mg oral capsule ; Simple Display Line:   50 mg, 1 cap(s), Oral, bid, 0 Refill(s) ; Catalog Code:   docusate ; Order Dt/Tm:   6/23/2021 1:39:09 PM CDT            Social History   Social History   (As Of: 11/24/2021 4:46:54 PM CST)   Alcohol:        1 x per month, 1  drinks/episode average.  2 drinks/episode maximum.   (Last Updated: 3/13/2017 3:39:56 PM CDT by Yulisa Ray LPN)          Tobacco:  Past      Former smoker, quit more than 30 days ago   (Last Updated: 12/29/2020 10:42:34 AM CST by Dary Pro CMA)          Electronic Cigarette/Vaping:  Denies Electronic Cigarette Use      Electronic Cigarette Use: Never.   (Last Updated: 12/29/2020 10:43:04 AM CST by Dary Pro CMA)          Home/Environment:        Marital status:  x 2.   (Last Updated: 9/18/2014 9:11:25 AM CDT by Rama Pemberton)          Nutrition/Health:        Type of diet: Regular.   (Last Updated: 10/17/2013 11:40:58 AM CDT by Yulisa Ray LPN)          Exercise:  Regular exercise      (Last Updated: 10/17/2013 11:40:49 AM CDT by Yulisa Ray LPN )

## 2022-02-16 NOTE — NURSING NOTE
Comprehensive Intake Entered On:  5/29/2020 11:11 AM CDT    Performed On:  5/29/2020 11:08 AM CDT by Ivana Shields MA               Summary   Chief Complaint :   Scratch in back of throat, painfull x 1 week, pain goes in to ear    Menstrual Status :   Hysterectomy   Weight Measured :   154 lb(Converted to: 154 lb 0 oz, 69.85 kg)    Height Measured :   62.5 in(Converted to: 5 ft 2 in, 158.75 cm)    Body Mass Index :   27.72 kg/m2 (HI)    Body Surface Area :   1.75 m2   Systolic Blood Pressure :   118 mmHg   Diastolic Blood Pressure :   62 mmHg   Mean Arterial Pressure :   81 mmHg   Peripheral Pulse Rate :   80 bpm   BP Site :   Right arm   Pulse Site :   Radial artery   BP Method :   Manual   HR Method :   Manual   Ivana Shields MA - 5/29/2020 11:08 AM CDT   Health Status   Allergies Verified? :   Yes   Medication History Verified? :   Yes   Immunizations Current :   Other: Declined TDAP.   Medical History Verified? :   Yes   Pre-Visit Planning Status :   Completed   Tobacco Use? :   Former smoker   Ivana Shields MA - 5/29/2020 11:08 AM CDT   Consents   Consent for Immunization Exchange :   Consent Granted   Consent for Immunizations to Providers :   Consent Granted   Ivana Shields MA - 5/29/2020 11:08 AM CDT   Meds / Allergies   (As Of: 5/29/2020 11:11:57 AM CDT)   Allergies (Active)   doxycycline  Estimated Onset Date:   Unspecified ; Reactions:   Ill ; Created By:   Nena Edouard CMA; Reaction Status:   Active ; Category:   Drug ; Substance:   doxycycline ; Type:   Allergy ; Updated By:   Nena Edouard CMA; Reviewed Date:   4/20/2020 3:58 PM CDT      naproxen  Estimated Onset Date:   Unspecified ; Created By:   Yulisa Ray LPN; Reaction Status:   Active ; Category:   Drug ; Substance:   naproxen ; Type:   Allergy ; Updated By:   Yulisa Ray LPN; Reviewed Date:   4/20/2020 3:58 PM CDT      sulfa drug  Estimated Onset Date:   Unspecified ; Reactions:   Hives ; Created By:   Yulisa Rya LPN;  Reaction Status:   Active ; Category:   Drug ; Substance:   sulfa drug ; Type:   Allergy ; Updated By:   Yulisa Ray LPN; Reviewed Date:   4/20/2020 3:58 PM CDT        Medication List   (As Of: 5/29/2020 11:11:57 AM CDT)   Prescription/Discharge Order    levothyroxine  :   levothyroxine ; Status:   Prescribed ; Ordered As Mnemonic:   levothyroxine 75 mcg (0.075 mg) oral tablet ; Simple Display Line:   75 mcg, 1 tab(s), po, daily, 90 tab(s), 1 Refill(s) ; Ordering Provider:   Veto GASPAR [facility]; Catalog Code:   levothyroxine ; Order Dt/Tm:   4/20/2020 4:09:26 PM CDT            Home Meds    calcium-vitamin D  :   calcium-vitamin D ; Status:   Documented ; Ordered As Mnemonic:   Calcium 600+D ; Simple Display Line:   2 tab(s), po, daily ; Catalog Code:   calcium-vitamin D ; Order Dt/Tm:   8/4/2014 9:31:21 AM CDT            ID Risk Screen   Recent Travel History :   No recent travel   Family Member Travel History :   No recent travel   Other Exposure to Infectious Disease :   Unknown   Ivana Shields MA - 5/29/2020 11:08 AM CDT

## 2022-02-16 NOTE — PROGRESS NOTES
Patient:   MARLEN ARDON            MRN: [id number]            FIN: [id number]               Age:   50 years     Sex:  Female     :  1970   Associated Diagnoses:   Ear pain, left   Author:   Vanessa Brown      Visit Information      Date of Service: 2021 11:50 am  Performing Location: [facility]  Encounter#: [id number]      Primary Care Provider (PCP):  Justina Laws MD    NPI# [id number]      Referring Provider:  Vanessa Brown    NPI# [id number]      Chief Complaint   3/2/2021 12:01 PM CST    bilateral ear pain, the left side is worse than the right though, has frequent bloody nose and thinks it is backing into sinuses and ear, no fever, no other symptoms        History of Present Illness   bilat ear pain a couple days, worse on left  no fever, mild congestion present  she had a bloody nose last night, not unusual for her, ear is more sore today  no cough or fever, no OTC used      Health Status   Allergies:    Allergic Reactions (Selected)  Severity Not Documented  Doxycycline (Ill)  Naproxen (No reactions were documented)  Sulfa drug (Hives)   Medications:  (Selected)   Prescriptions  Prescribed  Lexapro 10 mg oral tablet: = 1 tab(s) ( 10 mg ), PO, Daily, # 90 tab(s), 1 Refill(s), Type: Maintenance, Pharmacy: [facility], 1 tab(s) Oral daily, 62.5, in, 10/07/20 11:31:00 CDT, Height Measured, 149, lb, 10/07/20 11:31:00 CDT, Weight Measured  hydrocortisone/neomycin/polymyxin B 1%-0.35%-10,000 units/mL otic solution: 2 drop(s), Ear-Left, tid, # 10 mL, 0 Refill(s), Type: Maintenance, Pharmacy: [facility], 2 drop(s) Ear-Left tid,x7 day(s), 62.5, in, 21 12:01:00 CST, Height Measured, 142.9, lb, 21 12:01:00 CST, Weight Measured  levothyroxine 88 mcg (0.088 mg) oral tablet: = 1 tab(s), Oral, daily, Instructions: INCREASE., # 90 tab(s), 2 Refill(s), Type: Maintenance, Pharmacy: [facility], TAKE 1 TABLET BY MOUTH ONCE DAILY ( INCREASE ), 62.5, in,  10/07/20 11:31:00 CDT, Height Measured, 149, lb, 10/07/20 11:31:00 CDT,...  Documented Medications  Documented  Calcium 600+D: 2 tab(s), po, daily, 0 Refill(s), Type: Maintenance,    Medications          *denotes recorded medication          *Calcium 600+D: 2 tab(s), po, daily.          Lexapro 10 mg oral tablet: 10 mg, 1 tab(s), PO, Daily, 90 tab(s), 1 Refill(s).          hydrocortisone/neomycin/polymyxin B 1%-0.35%-10,000 units/mL otic solution: 2 drop(s), Ear-Left, tid, for 7 day(s), 10 mL, 0 Refill(s).          levothyroxine 88 mcg (0.088 mg) oral tablet: 1 tab(s), Oral, daily, INCREASE., 90 tab(s), 2 Refill(s).       Problem list:    All Problems  Anxiety Disorder / SNOMED CT [id number] / Confirmed  Acquired hypothyroidism / SNOMED CT 581327802 / Confirmed  Chronic insomnia / SNOMED CT 064087344 / Confirmed  Osteopenia / SNOMED CT 053342401 / Confirmed  Ovarian cyst / SNOMED CT 77JP37I6-AU92-2G9B-[id number] / Confirmed  Right ovary      Histories   Past Medical History:    Active  Ovarian cyst ([id number])  Comments:  10/17/2013 CDT 11:40 AM JULIO CÉSAR - Yulisa Ray LPN  Right ovary   Family History:    MI  Father  Diabetes mellitus  Mother  Hypertension  Father     Procedure history:    Hysterectomy and unilateral salpingo-oophorectomy sample (SNOMED CT 172078789) performed by Ramesh Shetty MD on 6/2/2005 at 34 Years.  Comments:  10/17/2013 11:39 AM Yulisa Onofre LPN ov elisabet removed.   Social History:        Electronic Cigarette/Vaping Assessment: Denies Electronic Cigarette Use            Electronic Cigarette Use: Never.      Alcohol Assessment            1 x per month, 1 drinks/episode average.  2 drinks/episode maximum.      Tobacco Assessment: Past            Former smoker, quit more than 30 days ago      Home and Environment Assessment            Marital status:  x 2.      Nutrition and Health Assessment            Type of diet:  Regular.      Exercise and Physical Activity Assessment: Regular exercise        Physical Examination   VS/Measurements   General:  Alert and oriented, No acute distress.    HENT:  Tympanic membranes are clear, Normal hearing, Oral mucosa is moist, No pharyngeal erythema, left ear canal with single pimple in it, tender.    Neck:  Supple, Non-tender, No lymphadenopathy.    Integumentary:  Warm, Dry, Pink.       Impression and Plan   Diagnosis     Ear pain, left ([id number] H92.02).     Patient Instructions:       Counseled: Patient, Regarding diagnosis, Regarding treatment, Regarding medications, Verbalized understanding, Counseled on symptomatic management. Return to clinic for re evaluation if worsening, simply not improving, or failure to resolve.   .    Orders     Orders (Selected)   Prescriptions  Prescribed  hydrocortisone/neomycin/polymyxin B 1%-0.35%-10,000 units/mL otic solution: 2 drop(s), Ear-Left, tid, # 10 mL, 0 Refill(s), Type: Maintenance, Pharmacy: [facility], 2 drop(s) Ear-Left tid,x7 day(s), 62.5, in, 03/02/21 12:01:00 CST, Height Measured, 142.9, lb, 03/02/21 12:01:00 CST, Weight Measured.

## 2022-02-16 NOTE — PROGRESS NOTES
Patient:   MARLEN ARDON            MRN: [id number]            FIN: [id number]               Age:   49 years     Sex:  Female     :  1970   Associated Diagnoses:   None   Author:   Talib Martinez MD       -   Today's date:  2020 3:10:00 PM .        -   To whom it may concern:        This patient is currently under my care and Was seen in my office on  2020.  .     Please excuse him/ her from work, 2/.  He/ she may return to on  2020, without restrictions.  Follow up as needed   Please contact me if you have any questions or concerns.      -   Sincerely,             Talib Martinez MD  [address]  [address] [phone]

## 2022-02-16 NOTE — TELEPHONE ENCOUNTER
---------------------  From: Justina Laws MD   To: [employer] Message Pool ([id number]);     Sent: 10/15/2020 11:03:56 AM CDT  Subject: ultrasound results     called patient regarding ultrasound of thyroid. Left VM to call back to speak with clinical staff for good news.    Ultrasound showed one very small nodule, no follow up is required per guidelines. We can discuss in the future whether she would like to consider repeating the ultrasound in 1-2 years, but generally no follow up is needed. If she is still having symptoms and wants to see ENT, I am happy to place that referral.    Second, her [facility] paperwork is completed - I thought she was going to stop to  when she came for ultrasound. Her copy is in my HI box. She can  or we can mail or fax to her.[facility] contacted pt after she LM at 1244 re: call back.  Pt informed of results and [facility] recommendation.  Her fiance, Talib Hobbs, has an appt w/ [facility] at 1400 today and she would like him to  FMLA paperwork during his visit.FYI re: FMLA paperwork.---------------------  From: Dary Pro CMA ([employer] Message Pool ([id number]_[facility]))   To: Justina Laws MD;     Sent: 10/15/2020 2:15:48 PM CDT  Subject: FW: ultrasound resultspaperwork given to patient's husbandcorrection - fimo

## 2022-02-16 NOTE — PROGRESS NOTES
Patient:   MARLEN ARDON            MRN: [id number]            FIN: [id number]               Age:   51 years     Sex:  Female     :  1970   Associated Diagnoses:   None   Author:   Jurgen Potts PA-C       -   Today's date:  10/29/2021 2:47:00 PM .        -      Please excuse him/ her from work, Wed-Friday of this week due to an illness.

## 2022-02-16 NOTE — LETTER
(Inserted Image. Unable to display) January 25, 2021Re: MARLEN BETH REVADOB:  1970  [facility]  [address]  [address]Gx:   [facility]The following patient has been referred to your office/practice: MARLEN BETH REVA  Appointment pending with Dr. Alon Santillan.  Please call patient to schedule.Please refer to the attached clinical documentation for a summary of MARLEN BETH's care.  Please do not hesitate to contact our office if any additional clinical questions arise.  All relevant records and transition of care documents should be mailed or faxed.  Your assistance in providing continuity of care is appreciated.Sincerely, [facility] & [place] [address] [place] [address](P) [phone](F) [phone]

## 2022-02-16 NOTE — PROGRESS NOTES
Patient:   MARLEN ARDON            MRN: [id number]            FIN: [id number]               Age:   51 years     Sex:  Female     :  1970   Associated Diagnoses:   None   Author:   Jurgen Potts PA-C       -   Today's date:  10/22/2021 7:20:00 AM .        -   To whom it may concern:        This patient is currently under my care.  He/ she may return to work, on  10/25/2021, with the following restrictions: Max 4 hours per day for next 3 weeks..

## 2022-02-16 NOTE — LETTER
(Inserted Image. Unable to display)   November 01, 2019      MARLEN ARDON  [address] [place] [id number]        Dear MARLEN BETH,      Thank you for selecting [facility] ([facility] [place] [place] [facility]) for your healthcare needs.     Our records indicate you are due for the following services:     Non-Fasting Lab    If you had your labs done at another facility or with [facility] at [facility], please bring in a copy of the results to your next visit, mail a copy, or drop off a copy of your results to your Healthcare Provider.    To schedule an appointment or if you have further questions, please contact your primary clinic:   [facility]          [phone]   [facility]    [phone]             [facility]         [phone]      Powered by [employer]    Sincerely,    Justina Laws M.D.

## 2022-02-16 NOTE — NURSING NOTE
Comprehensive Intake Entered On:  3/2/2021 12:05 PM CST    Performed On:  3/2/2021 12:01 PM CST by Graciela Aguila LPN               Summary   Chief Complaint :   bilateral ear pain, the left side is worse than the right though, has frequent bloody nose and thinks it is backing into sinuses and ear, no fever, no other symptoms   Menstrual Status :   Hysterectomy   Weight Measured :   142.9 lb(Converted to: 142 lb 14 oz, 64.818 kg)    Height Measured :   62.5 in(Converted to: 5 ft 2 in, 158.75 cm)    Body Mass Index :   25.72 kg/m2 (HI)    Body Surface Area :   1.69 m2   Systolic Blood Pressure :   108 mmHg   Diastolic Blood Pressure :   68 mmHg   Mean Arterial Pressure :   81 mmHg   Peripheral Pulse Rate :   99 bpm   BP Site :   Right arm   BP Method :   Manual   Temperature Tympanic :   96.8 DegF(Converted to: 36.0 DegC)  (LOW)    Oxygen Saturation :   89 % (LOW)    Graciela Aguila LPN - 3/2/2021 12:01 PM CST   Health Status   Allergies Verified? :   Yes   Medication History Verified? :   Yes   Immunizations Current :   Other: Declined TDAP.   Medical History Verified? :   No   Pre-Visit Planning Status :   Completed   Tobacco Use? :   Former smoker   Graciela Aguila LPN - 3/2/2021 12:01 PM CST   Meds / Allergies   (As Of: 3/2/2021 12:06:00 PM CST)   Allergies (Active)   doxycycline  Estimated Onset Date:   Unspecified ; Reactions:   Ill ; Created By:   Nena Edouard CMA; Reaction Status:   Active ; Category:   Drug ; Substance:   doxycycline ; Type:   Allergy ; Updated By:   Nena Edouard CMA; Reviewed Date:   1/21/2021 11:12 AM CST      naproxen  Estimated Onset Date:   Unspecified ; Created By:   Yulisa Ray LPN; Reaction Status:   Active ; Category:   Drug ; Substance:   naproxen ; Type:   Allergy ; Updated By:   Yulisa Ray LPN; Reviewed Date:   1/21/2021 11:12 AM CST      sulfa drug  Estimated Onset Date:   Unspecified ; Reactions:   Hives ; Created By:   Yulisa Ray LPN; Reaction Status:    Active ; Category:   Drug ; Substance:   sulfa drug ; Type:   Allergy ; Updated By:   Yulisa Ray LPN; Reviewed Date:   1/21/2021 11:12 AM CST        Medication List   (As Of: 3/2/2021 12:06:00 PM CST)   Prescription/Discharge Order    escitalopram  :   escitalopram ; Status:   Prescribed ; Ordered As Mnemonic:   Lexapro 10 mg oral tablet ; Simple Display Line:   10 mg, 1 tab(s), PO, Daily, 90 tab(s), 1 Refill(s) ; Ordering Provider:   Justina Laws MD; Catalog Code:   escitalopram ; Order Dt/Tm:   10/7/2020 11:54:00 AM CDT          levothyroxine  :   levothyroxine ; Status:   Prescribed ; Ordered As Mnemonic:   levothyroxine 88 mcg (0.088 mg) oral tablet ; Simple Display Line:   1 tab(s), Oral, daily, INCREASE., 90 tab(s), 2 Refill(s) ; Ordering Provider:   Justina Laws MD; Catalog Code:   levothyroxine ; Order Dt/Tm:   12/23/2020 4:32:41 PM CST            Home Meds    calcium-vitamin D  :   calcium-vitamin D ; Status:   Documented ; Ordered As Mnemonic:   Calcium 600+D ; Simple Display Line:   2 tab(s), po, daily ; Catalog Code:   calcium-vitamin D ; Order Dt/Tm:   8/4/2014 9:31:21 AM CDT            ID Risk Screen   Recent Travel History :   No recent travel   Family Member Travel History :   No recent travel   Other Exposure to Infectious Disease :   Unknown   COVID-19 Testing Status :   No COVID-19 test performed   Graciela Aguila LPN - 3/2/2021 12:01 PM CST

## 2022-02-16 NOTE — PROGRESS NOTES
Patient:   MARLEN ARDON            MRN: [id number]            FIN: [id number]               Age:   51 years     Sex:  Female     :  1970   Associated Diagnoses:   None   Author:   Jurgen Potts PA-C       -   Today's date:  2021 10:13:00 AM .        -   To whom it may concern:        This patient is currently under my care and Was seen in my office on  2021 10:13:00 AM.  .     Please excuse him/ her from work, Off Nov .

## 2022-02-16 NOTE — TELEPHONE ENCOUNTER
---------------------  From: Geeta Lee CMA (Phone Messages Pool ([id number]_[facility]))   To: Justina Laws MD;     Sent: 3/20/2019 11:38:01 AM CDT  Subject: phone note- work note     Phone Message    PCP:    CHILANGO      Time of Call:  11:34am       Person Calling:  Martina Keane  Phone number:  [phone]    Returned call at: n/a    Note:  Patient called asking for work note- took today off to rest stomach. Please advise.     Last office visit and reason:  _3/19/19    Transferred to: _Printed.---------------------  From: Justina Laws MD   To: [employer] ([id number]_[facility]);     Sent: 3/20/2019 11:56:38 AM CDT  Subject: RE: phone note- work noteTime: 1:11 pm  Note: Patient was notified. She will call with a fax # tomorrow. Attn: Mari Holly Call  Time: 2:57 pm  Note:  Called & left a message for pt asking for a fax number.Return Call    Time: 146pm    Note: called and left patient a message asking for a fax number

## 2022-02-16 NOTE — NURSING NOTE
Comprehensive Intake Entered On:  11/19/2021 9:50 AM CST    Performed On:  11/19/2021 9:44 AM CST by Yvon Clinton LPN               Summary   Chief Complaint :   f/u on lung issues and discuss return to work, feels like sinuses are improving.   Menstrual Status :   Hysterectomy   Weight Measured :   139 lb(Converted to: 139 lb 0 oz, 63.049 kg)    Height Measured :   62.5 in(Converted to: 5 ft 2 in, 158.75 cm)    Body Mass Index :   25.02 kg/m2 (HI)    Body Surface Area :   1.67 m2   Systolic Blood Pressure :   114 mmHg   Diastolic Blood Pressure :   72 mmHg   Mean Arterial Pressure :   86 mmHg   Peripheral Pulse Rate :   76 bpm   BP Site :   Right arm   Pulse Site :   Radial artery   BP Method :   Manual   HR Method :   Manual   Temperature Tympanic :   97.5 DegF(Converted to: 36.4 DegC)  (LOW)    Oxygen Saturation :   99 %   Yvon Clinton LPN - 11/19/2021 9:44 AM CST   Consents   Consent for Immunization Exchange :   Consent Granted   Consent for Immunizations to Providers :   Consent Granted   Yvon Clinton LPN - 11/19/2021 9:44 AM CST   Meds / Allergies   (As Of: 11/19/2021 9:50:25 AM CST)   Allergies (Active)   doxycycline  Estimated Onset Date:   Unspecified ; Reactions:   Ill ; Created By:   Nena Edouard CMA; Reaction Status:   Active ; Category:   Drug ; Substance:   doxycycline ; Type:   Allergy ; Updated By:   Nena Edouard CMA; Reviewed Date:   11/19/2021 9:47 AM CST      naproxen  Estimated Onset Date:   Unspecified ; Created By:   Yulisa Ray LPN; Reaction Status:   Active ; Category:   Drug ; Substance:   naproxen ; Type:   Allergy ; Updated By:   Yulisa Ray LPN; Reviewed Date:   11/19/2021 9:47 AM CST      sulfa drug  Estimated Onset Date:   Unspecified ; Reactions:   Hives ; Created By:   Yulisa Ray LPN; Reaction Status:   Active ; Category:   Drug ; Substance:   sulfa drug ; Type:   Allergy ; Updated By:   Yulisa Ray LPN; Reviewed Date:   11/19/2021 9:47 AM CST         Medication List   (As Of: 11/19/2021 9:50:25 AM CST)   Prescription/Discharge Order    albuterol  :   albuterol ; Status:   Prescribed ; Ordered As Mnemonic:   Albuterol (Eqv-ProAir HFA) 90 mcg/inh inhalation aerosol ; Simple Display Line:   2 puff(s), Inhale, q6 hrs, 6.7 gm, 0 Refill(s) ; Ordering Provider:   Jurgen Potts PA-C; Catalog Code:   albuterol ; Order Dt/Tm:   10/11/2021 4:46:00 PM CDT          amoxicillin-clavulanate  :   amoxicillin-clavulanate ; Status:   Prescribed ; Ordered As Mnemonic:   amoxicillin-clavulanate 875 mg-125 mg oral tablet ; Simple Display Line:   1 tab(s), Oral, q12 hrs, for 10 day(s), 20 tab(s), 0 Refill(s) ; Ordering Provider:   Jurgen Potts PA-C; Catalog Code:   amoxicillin-clavulanate ; Order Dt/Tm:   11/16/2021 10:13:09 AM CST          fluticasone  :   fluticasone ; Status:   Prescribed ; Ordered As Mnemonic:   Flovent  mcg/inh inhalation aerosol ; Simple Display Line:   220 mcg, Inhale, bid, 1 EA, 0 Refill(s) ; Ordering Provider:   Justina Laws MD; Catalog Code:   fluticasone ; Order Dt/Tm:   10/27/2021 1:33:36 PM CDT          levothyroxine  :   levothyroxine ; Status:   Prescribed ; Ordered As Mnemonic:   levothyroxine 88 mcg (0.088 mg) oral tablet ; Simple Display Line:   1 tab(s), Oral, daily, INCREASE., 90 tab(s), 2 Refill(s) ; Ordering Provider:   Justina Laws MD; Catalog Code:   levothyroxine ; Order Dt/Tm:   12/23/2020 4:32:41 PM CST            Home Meds    calcium-vitamin D  :   calcium-vitamin D ; Status:   Documented ; Ordered As Mnemonic:   Calcium 600+D ; Simple Display Line:   2 tab(s), po, daily ; Catalog Code:   calcium-vitamin D ; Order Dt/Tm:   8/4/2014 9:31:21 AM CDT          docusate  :   docusate ; Status:   Documented ; Ordered As Mnemonic:   Colace 50 mg oral capsule ; Simple Display Line:   50 mg, 1 cap(s), Oral, bid, 0 Refill(s) ; Catalog Code:   docusate ; Order Dt/Tm:   6/23/2021 1:39:09 PM CDT            Social History   Social  History   (As Of: 11/19/2021 9:50:25 AM CST)   Alcohol:        1 x per month, 1 drinks/episode average.  2 drinks/episode maximum.   (Last Updated: 3/13/2017 3:39:56 PM CDT by Yulisa Ray LPN)          Tobacco:  Past      Former smoker, quit more than 30 days ago   (Last Updated: 12/29/2020 10:42:34 AM CST by Dary Pro CMA)          Electronic Cigarette/Vaping:  Denies Electronic Cigarette Use      Electronic Cigarette Use: Never.   (Last Updated: 12/29/2020 10:43:04 AM CST by Dary Pro CMA)          Home/Environment:        Marital status:  x 2.   (Last Updated: 9/18/2014 9:11:25 AM CDT by Rama Pemberton)          Nutrition/Health:        Type of diet: Regular.   (Last Updated: 10/17/2013 11:40:58 AM CDT by Yulisa Ray LPN)          Exercise:  Regular exercise      (Last Updated: 10/17/2013 11:40:49 AM CDT by Yulisa Ray LPN )

## 2022-02-16 NOTE — NURSING NOTE
Comprehensive Intake Entered On:  7/15/2019 4:09 PM CDT    Performed On:  7/15/2019 4:05 PM CDT by Kim Colby CMA               Summary   Chief Complaint :   possible yeast infection; discuss meds   Menstrual Status :   Hysterectomy   Weight Measured :   146.2 lb(Converted to: 146 lb 3 oz, 66.32 kg)    Height Measured :   62.5 in(Converted to: 5 ft 2 in, 158.75 cm)    Body Mass Index :   26.31 kg/m2 (HI)    Body Surface Area :   1.71 m2   Systolic Blood Pressure :   106 mmHg   Diastolic Blood Pressure :   60 mmHg   Mean Arterial Pressure :   75 mmHg   Peripheral Pulse Rate :   68 bpm   BP Method :   Manual   HR Method :   Manual   Kim Colby CMA - 7/15/2019 4:05 PM CDT   Health Status   Allergies Verified? :   Yes   Medication History Verified? :   Yes   Immunizations Current :   Other: Declined TDAP.   Medical History Verified? :   Yes   Pre-Visit Planning Status :   Completed   Tobacco Use? :   Former smoker   Kim Colby CMA - 7/15/2019 4:05 PM CDT   Consents   Consent for Immunization Exchange :   Consent Granted   Consent for Immunizations to Providers :   Consent Granted   Kim Colby CMA - 7/15/2019 4:05 PM CDT   Meds / Allergies   (As Of: 7/15/2019 4:09:45 PM CDT)   Allergies (Active)   doxycycline  Estimated Onset Date:   Unspecified ; Reactions:   Ill ; Created By:   Nena Edouard CMA; Reaction Status:   Active ; Category:   Drug ; Substance:   doxycycline ; Type:   Allergy ; Updated By:   Nena Edouard CMA; Reviewed Date:   7/15/2019 4:06 PM CDT      naproxen  Estimated Onset Date:   Unspecified ; Created By:   Yulisa Ray LPN; Reaction Status:   Active ; Category:   Drug ; Substance:   naproxen ; Type:   Allergy ; Updated By:   Yulisa Ray LPN; Reviewed Date:   7/15/2019 4:06 PM CDT      sulfa drug  Estimated Onset Date:   Unspecified ; Reactions:   Hives ; Created By:   Yulisa Ray LPN; Reaction Status:   Active ; Category:   Drug ; Substance:   sulfa drug ; Type:   Allergy ; Updated  By:   Yulisa Ray LPN; Reviewed Date:   7/15/2019 4:06 PM CDT        Medication List   (As Of: 7/15/2019 4:09:45 PM CDT)   Prescription/Discharge Order    amoxicillin-clavulanate  :   amoxicillin-clavulanate ; Status:   Processing ; Ordered As Mnemonic:   Augmentin 875 mg oral tablet ; Ordering Provider:   Justina Laws MD; Action Display:   Complete ; Catalog Code:   amoxicillin-clavulanate ; Order Dt/Tm:   7/15/2019 4:08:06 PM          levothyroxine  :   levothyroxine ; Status:   Prescribed ; Ordered As Mnemonic:   levothyroxine 100 mcg (0.1 mg) oral tablet ; Simple Display Line:   100 mcg, 1 tab(s), PO, Daily, 90 tab(s), 3 Refill(s) ; Ordering Provider:   Justina Laws MD; Catalog Code:   levothyroxine ; Order Dt/Tm:   3/19/2019 6:45:04 PM            Home Meds    calcium-vitamin D  :   calcium-vitamin D ; Status:   Documented ; Ordered As Mnemonic:   Calcium 600+D ; Simple Display Line:   2 tab(s), po, daily ; Catalog Code:   calcium-vitamin D ; Order Dt/Tm:   8/4/2014 9:31:21 AM

## 2022-02-16 NOTE — PROGRESS NOTES
Patient:   MARLEN ARDON            MRN: [id number]            FIN: [id number]               Age:   51 years     Sex:  Female     :  1970   Associated Diagnoses:   Post-COVID chronic cough; Sprain of left shoulder   Author:   Justina Laws MD      Visit Information      Date of Service: 10/27/2021 01:10 pm  Performing Location: [facility]  Encounter#: [id number]      Chief Complaint   10/27/2021 1:14 PM CDT   c/o SOB, cough, fatigue- had covid x1 month ago and ever since then she has had SOB.      History of Present Illness   patient with shortness of breath, significant fatigue, and cough for the past couple of weeks  diagnosed with COVID one month ago, had loss of taste and smell, high fever, body aches and chills, and extreme fatigue  had felt like she was improving but now for the past week does not feel like she has improved further  short of breath with activity  cough has been dry  cough improves with albuterol  has noticed more sinus pressure and congestion  no ongoing fevers    also has ongoing left shoulder pain since injury this summer  no loss of ROM but pain with full ROM would like to see PT      Health Status   Allergies:    Allergic Reactions (All)  Severity Not Documented  Doxycycline (Ill)  Naproxen (No reactions were documented)  Sulfa drug (Hives)   Medications:  (Selected)   Prescriptions  Prescribed  Albuterol (Eqv-ProAir HFA) 90 mcg/inh inhalation aerosol: 2 puff(s), Inhale, q6 hrs, # 6.7 gm, 0 Refill(s), Type: Maintenance, Pharmacy: [facility], 2 puff(s) Inhale q6 hrs, 62.5, in, 10/11/21 16:26:00 CDT, Height Measured, 139.5, lb, 10/11/21 16:26:00 CDT, Weight Measured  levothyroxine 88 mcg (0.088 mg) oral tablet: = 1 tab(s), Oral, daily, Instructions: INCREASE., # 90 tab(s), 2 Refill(s), Type: Maintenance, Pharmacy: [facility], TAKE 1 TABLET BY MOUTH ONCE DAILY ( INCREASE ), 62.5, in, 10/07/20 11:31:00 CDT, Height Measured, 149, lb, 10/07/20 11:31:00  CDT,...  Documented Medications  Documented  Calcium 600+D: 2 tab(s), po, daily, 0 Refill(s), Type: Maintenance  Colace 50 mg oral capsule: = 1 cap(s) ( 50 mg ), Oral, bid, 0 Refill(s), Type: Maintenance,    Medications          *denotes recorded medication          Albuterol (Eqv-ProAir HFA) 90 mcg/inh inhalation aerosol: 2 puff(s), Inhale, q6 hrs, 6.7 gm, 0 Refill(s).          *Calcium 600+D: 2 tab(s), po, daily.          *Colace 50 mg oral capsule: 50 mg, 1 cap(s), Oral, bid, 0 Refill(s).          levothyroxine 88 mcg (0.088 mg) oral tablet: 1 tab(s), Oral, daily, INCREASE., 90 tab(s), 2 Refill(s).       Problem list:    All Problems  Acquired hypothyroidism / SNOMED CT 380460382 / Confirmed  Anxiety Disorder / SNOMED CT 2AX90Q95-449O-56L1-CA85-TEU02985C216 / Confirmed  Chronic insomnia / SNOMED CT 295910589 / Confirmed  Osteopenia / SNOMED CT 690750101 / Confirmed  Ovarian cyst / SNOMED CT 86DP68D6-CR29-2E6Y-Q56B-2MWX13W852KX / Confirmed  Right ovary  Unspecified abdominal pain / SNOMED CT 42735509 / Confirmed      Histories   Past Medical History:    Active  Ovarian cyst (SNOMED CT 49EB12A5-VK60-4C4J-L02G-9RPP42G349LH)  Comments:  10/17/2013 CDT 11:40 AM CDT - Yulisa Ray LPN  Right ovary  Osteopenia (SNOMED CT 572052966)  Chronic insomnia (SNOMED CT 345748400)  Anxiety Disorder (SNOMED CT 0UX94O79-338I-10N6-HB63-WIO31490O925)  Acquired hypothyroidism (SNOMED CT 391671535)  Unspecified abdominal pain (SNOMED CT 50783482)   Family History:    MI  Father  Diabetes mellitus  Mother  Hypertension  Father     Procedure history:    Colonoscopy ([id number]) on 3/26/2021 at 50 Years.  Comments:  4/15/2021 7:59 AM CDT - Rama Pemberton  Indication: Left lower quadrant abdominal pain.   Sedation: Versed, Fentanyl.  Findings: Normal with the exception of severe diverticulosis in descending and sigmoid colon.  Hysterectomy and unilateral salpingo-oophorectomy sample ([id number]) on 6/2/2005 at 34  Years.  Comments:  10/17/2013 11:39 AM CDT - Cory GOLDENRICKEY, Yulisa  RADHA ov elisabet removed.   Social History:        Electronic Cigarette/Vaping Assessment: Denies Electronic Cigarette Use            Electronic Cigarette Use: Never.      Alcohol Assessment            1 x per month, 1 drinks/episode average.  2 drinks/episode maximum.      Tobacco Assessment: Past            Former smoker, quit more than 30 days ago      Home and Environment Assessment            Marital status:  x 2.      Nutrition and Health Assessment            Type of diet: Regular.      Exercise and Physical Activity Assessment: Regular exercise        Physical Examination   Vital Signs   10/27/2021 1:14 PM CDT Temperature Tympanic 97.1 DegF  LOW    Peripheral Pulse Rate 94 bpm    HR Method Electronic    Systolic Blood Pressure 100 mmHg    Diastolic Blood Pressure 62 mmHg    Mean Arterial Pressure 75 mmHg    BP Site Right arm    BP Method Manual    Oxygen Saturation 99 %      General:  Alert and oriented, No acute distress.    Eye:  Pupils are equal, round and reactive to light, Normal conjunctiva.    HENT:  Normocephalic, Tympanic membranes are clear, Oral mucosa is moist, No pharyngeal erythema, No sinus tenderness.    Neck:  Supple, Non-tender, No lymphadenopathy.    Respiratory:  Lungs are clear to auscultation.    Cardiovascular:  Normal rate, Regular rhythm.       Impression and Plan   Diagnosis     Post-COVID chronic cough (TQI45-BH R05.3).     Plan:  improved on albuterol but does not tolerate side effects. Will trial inhaled steroids. If not improving, call and will schedule for CXR. No evidence of ongoing infection..    Orders     Orders (Selected)   Prescriptions  Prescribed  Flovent  mcg/inh inhalation aerosol: ( 220 mcg ), Inhale, bid, # 1 EA, 0 Refill(s), Type: Maintenance, Pharmacy: [facility], 220 mcg Inhale bid, 62.5, in, 10/27/21 13:14:00 CDT, Height Measured, 140.7, lb, 10/27/21 13:14:00 CDT, Weight Measured.      Diagnosis     Sprain of left shoulder (IEU80-ZJ S43.402D).     Course:  referred for PT.

## 2022-02-16 NOTE — TELEPHONE ENCOUNTER
---------------------  From: Nena Mott RN (Phone Messages Pool ([id number]))   To: CHILANGO Message Pool ([id number]_[facility]);     Sent: 12/1/2021 12:51:04 PM CST  Subject: General Message-MRI       PCP:  CHILANGO      Time of Call:  12:45pm       Person Calling:  pt  Phone number:  [phone]    Note:   Pt called in, stating she was returning call to Cathy re: MRI scheduling. Pt stated she would like to complete at [facility] if able.    Please assist with MRI.    Last office visit and reason:  11/24/21 dyspnea KAHOrder faxed to [facility] at [phone].

## 2022-02-16 NOTE — NURSING NOTE
Comprehensive Intake Entered On:  10/1/2019 2:16 PM CDT    Performed On:  10/1/2019 2:12 PM CDT by Kim Colby CMA               Summary   Chief Complaint :   sore thoart; cough; hoarse voice; SOB x2 days   Menstrual Status :   Hysterectomy   Weight Measured :   148.0 lb(Converted to: 148 lb 0 oz, 67.13 kg)    Height Measured :   62.5 in(Converted to: 5 ft 2 in, 158.75 cm)    Body Mass Index :   26.64 kg/m2 (HI)    Body Surface Area :   1.72 m2   Systolic Blood Pressure :   104 mmHg   Diastolic Blood Pressure :   60 mmHg   Mean Arterial Pressure :   75 mmHg   Peripheral Pulse Rate :   98 bpm   Temperature Tympanic :   98.0 DegF(Converted to: 36.7 DegC)    Oxygen Saturation :   97 %   Kim Colby CMA - 10/1/2019 2:12 PM CDT   Health Status   Allergies Verified? :   Yes   Medication History Verified? :   Yes   Immunizations Current :   Other: Declined TDAP.   Medical History Verified? :   Yes   Pre-Visit Planning Status :   Completed   Tobacco Use? :   Former smoker   Kim Colby CMA - 10/1/2019 2:12 PM CDT   Consents   Consent for Immunization Exchange :   Consent Granted   Consent for Immunizations to Providers :   Consent Granted   Kim Colby CMA - 10/1/2019 2:12 PM CDT   Meds / Allergies   (As Of: 10/1/2019 2:16:10 PM CDT)   Allergies (Active)   doxycycline  Estimated Onset Date:   Unspecified ; Reactions:   Ill ; Created By:   Nena Edouard CMA; Reaction Status:   Active ; Category:   Drug ; Substance:   doxycycline ; Type:   Allergy ; Updated By:   Nena Edouard CMA; Reviewed Date:   10/1/2019 2:14 PM CDT      naproxen  Estimated Onset Date:   Unspecified ; Created By:   Yulisa Ray LPN; Reaction Status:   Active ; Category:   Drug ; Substance:   naproxen ; Type:   Allergy ; Updated By:   Yulisa Ray LPN; Reviewed Date:   10/1/2019 2:14 PM CDT      sulfa drug  Estimated Onset Date:   Unspecified ; Reactions:   Hives ; Created By:   Yulisa Ray LPN; Reaction Status:   Active ; Category:   Drug  ; Substance:   sulfa drug ; Type:   Allergy ; Updated By:   Yulisa Ray LPN; Reviewed Date:   10/1/2019 2:14 PM CDT        Medication List   (As Of: 10/1/2019 2:16:10 PM CDT)   Prescription/Discharge Order    clindamycin topical  :   clindamycin topical ; Status:   Prescribed ; Ordered As Mnemonic:   clindamycin 2% vaginal cream ; Simple Display Line:   1 anthony, VAG, Once a day (at bedtime), for 7 day(s), 40 gm, 1 Refill(s) ; Ordering Provider:   Justina Laws MD; Catalog Code:   clindamycin topical ; Order Dt/Tm:   7/15/2019 4:18:02 PM          fluconazole  :   fluconazole ; Status:   Prescribed ; Ordered As Mnemonic:   Diflucan 150 mg oral tablet ; Simple Display Line:   150 mg, 1 tab(s), PO, q72hr, PRN: itching, 2 tab(s), 1 Refill(s) ; Ordering Provider:   Justina Laws MD; Catalog Code:   fluconazole ; Order Dt/Tm:   7/15/2019 4:18:02 PM          levothyroxine  :   levothyroxine ; Status:   Prescribed ; Ordered As Mnemonic:   levothyroxine 88 mcg (0.088 mg) oral tablet ; Simple Display Line:   88 mcg, 1 tab(s), PO, Daily, 90 tab(s), 3 Refill(s) ; Ordering Provider:   Justina Laws MD; Catalog Code:   levothyroxine ; Order Dt/Tm:   7/17/2019 9:14:01 AM            Home Meds    calcium-vitamin D  :   calcium-vitamin D ; Status:   Documented ; Ordered As Mnemonic:   Calcium 600+D ; Simple Display Line:   2 tab(s), po, daily ; Catalog Code:   calcium-vitamin D ; Order Dt/Tm:   8/4/2014 9:31:21 AM

## 2022-02-16 NOTE — PROGRESS NOTES
Patient:   MARLEN ARDON            MRN: [id number]            FIN: [id number]               Age:   49 years     Sex:  Female     :  1970   Associated Diagnoses:   Vomiting; Acute vaginitis; Acquired hypothyroidism   Author:   Justina Laws MD      Chief Complaint   7/15/2019 4:05 PM CDT    possible yeast infection; discuss meds      History of Present Illness   patient with episodes of vomiting twice after eating homemade lemon ice cream, was sick to her stomach Friday and today, needs note for work  patient has recurrence of vaginitis symptoms, has had both BV and yeast, notes some itching but also has been a little irritated, often has done dual treatment  patient also notes that she has been more fatigued, hx of hypothyroidism, last TSH 2018         Health Status   Allergies:    Allergic Reactions (All)  Severity Not Documented  Doxycycline (Ill)  Naproxen (No reactions were documented)  Sulfa drug (Hives)   Medications:  (Selected)   Prescriptions  Prescribed  levothyroxine 100 mcg (0.1 mg) oral tablet: = 1 tab(s) ( 100 mcg ), PO, Daily, # 90 tab(s), 3 Refill(s), Type: Maintenance, Pharmacy: [facility], 1 tab(s) Oral daily  Documented Medications  Documented  Calcium 600+D: 2 tab(s), po, daily, 0 Refill(s), Type: Maintenance   Problem list:    All Problems  Anxiety Disorder / SNOMED CT 9ZQ63R97-171F-89E6-OM46-KTN70963T770 / Confirmed  Acquired hypothyroidism / SNOMED CT 373248371 / Confirmed  Chronic insomnia / SNOMED CT 450660089 / Confirmed  Osteopenia / SNOMED CT 870789830 / Confirmed  Ovarian cyst / SNOMED CT 05VA59R0-GK89-2W3X-G91M-1ILY35S159LR / Confirmed      Histories   Past Medical History:    Active  Ovarian cyst (SNOMED CT 04FP94I3-JL15-4I3Z-P45Y-7GKO08E646XJ)  Comments:  10/17/2013 CDT 11:40 AM CDT - Yulisa Ray LPN  Right ovary   Family History:    MI  Father  Diabetes mellitus  Mother  Hypertension  Father     Procedure history:    Hysterectomy and unilateral  salpingo-oophorectomy sample ([id number]) on 6/2/2005 at 34 Years.  Comments:  10/17/2013 11:39 AM CDT - Yulisa Ray LPN ov elisabet removed.   Social History:        Alcohol Assessment            1 x per month, 1 drinks/episode average.  2 drinks/episode maximum.      Tobacco Assessment: Past            Past                     Comments:                      12/30/2014 - Yulisa Ray LPN                     Quit 3 yrs ago.      Home and Environment Assessment            Marital status:  x 2.      Nutrition and Health Assessment            Type of diet: Regular.      Exercise and Physical Activity Assessment: Regular exercise      Physical Examination   Vital Signs   7/15/2019 4:05 PM CDT Peripheral Pulse Rate 68 bpm    HR Method Manual    Systolic Blood Pressure 106 mmHg    Diastolic Blood Pressure 60 mmHg    Mean Arterial Pressure 75 mmHg    BP Method Manual      Measurements from flowsheet : Measurements   7/15/2019 4:05 PM CDT Height Measured - Standard 62.5 in    Weight Measured - Standard 146.2 lb    BSA 1.71 m2    Body Mass Index 26.31 kg/m2  HI      General:  Alert and oriented, No acute distress.    Eye:  Extraocular movements are intact, Normal conjunctiva.    HENT:  Normocephalic, Oral mucosa is moist.    Neck:  Supple, Non-tender.       Impression and Plan   Diagnosis     Vomiting (SIM28-YF R11.10).     Plan:  seems to be self-limited and resolving, note for work provided.    Diagnosis     Acute vaginitis (AXA77-WU N76.0).     Orders     Orders (Selected)   Prescriptions  Prescribed  Diflucan 150 mg oral tablet: = 1 tab(s) ( 150 mg ), PO, q72hr, PRN: itching, # 2 tab(s), 1 Refill(s), Type: Maintenance, Pharmacy: [facility], 1 tab(s) Oral q72 hrs,PRN:itching  clindamycin 2% vaginal cream: 1 anthony, VAG, Once a day (at bedtime), # 40 gm, 1 Refill(s), Type: Maintenance, Pharmacy: [facility], 1 anthony VAG hs,x7 day(s).     Diagnosis     Acquired hypothyroidism (STK05-NW E03.4).     Orders      TSH is pending.

## 2022-02-16 NOTE — PROGRESS NOTES
Patient:   MARLEN ARDON            MRN: [id number]            FIN: [id number]               Age:   46 years     Sex:  Female     :  1970   Associated Diagnoses:   Acute maxillary sinusitis; Acquired hypothyroidism   Author:   Justina Laws MD      Chief Complaint   3/13/2017 3:10 PM CDT    Pt. is here for med check-Thyroid. TSH due. Also, c/o fatigue, nasal and head congestion. Occasional cough,sneezing. Took Mucinex this AM.      Interval History   Patient with sinus congestion, started at least 2 weeks ago, initially thought she was improving but now worsening. Running a fever.   Dry, hacking cough.   Due for TSH. Requests to delay blood work due to feeling ill.       Health Status   Allergies:    Allergic Reactions (All)  Severity Not Documented  Naproxen (No reactions were documented)  Sulfa drug (Hives)   Medications:  (Selected)   Prescriptions  Prescribed  levothyroxine 75 mcg (0.075 mg) oral tablet: 1 tab(s) ( 75 mcg ), po, daily, # 90 tab(s), 3 Refill(s), Type: Maintenance, Pharmacy: [id number], 1 tab(s) po daily  Documented Medications  Documented  Calcium 600+D: 2 tab(s), po, daily, 0 Refill(s), Type: Maintenance   Problem list:    All Problems  Acquired hypothyroidism / SNOMED CT 690489607 / Confirmed  Anxiety Disorder / SNOMED CT 3OS34T04-929Z-73U9-CM34-[id number] / Confirmed  Chronic insomnia / SNOMED CT 895954764 / Confirmed  Osteopenia / SNOMED CT 745382596 / Confirmed  Ovarian cyst / SNOMED CT 40WJ59Y6-JX69-0C3Q-X83Y-1YHV74C902QG / Confirmed      Histories   Family History:    MI  Father  Diabetes mellitus  Mother  Hypertension  Father     Procedure history:    Hysterectomy and unilateral salpingo-oophorectomy sample ([id number]) on 2005 at 34 Years.  Comments:  10/17/2013 11:39 AM - Yulisa Ray LPNy removed.      Physical Examination   Vital Signs   3/13/2017 3:10 PM CDT Temperature Tympanic 100.5 DegF    Peripheral Pulse Rate 116 bpm  HI    Pulse Site  Radial artery    HR Method Manual    Systolic Blood Pressure 110 mmHg    Diastolic Blood Pressure 56 mmHg  LOW    Mean Arterial Pressure 74 mmHg    BP Site Right arm    BP Method Manual      Measurements from flowsheet : Measurements   3/13/2017 3:10 PM CDT Height Measured - Standard 62.5 in    Weight Measured - Standard 129.4 lb    BSA 1.61 m2    Body Mass Index 23.29 kg/m2      General:  Alert and oriented, No acute distress.    Eye:  Pupils are equal, round and reactive to light, Normal conjunctiva.    HENT:  Normocephalic, Tympanic membranes are clear, Oral mucosa is moist, No pharyngeal erythema.         Sinus: Bilateral, Maxillary sinus, Tenderness.    Neck:  Supple, Non-tender.    Respiratory:  Lungs are clear to auscultation.    Cardiovascular:  Normal rate, Regular rhythm.       Impression and Plan   Diagnosis     Acute maxillary sinusitis (XAX73-AC J01.01).     Course:  Worsening.    Orders     Orders (Selected)   Prescriptions  Prescribed  Amoxil 875 mg oral tablet: 1 tab(s) ( 875 mg ), PO, BID, # 20 tab(s), 0 Refill(s), Type: Maintenance, Pharmacy: [id number], 1 tab(s) po bid,x10 day(s)  Diflucan 150 mg oral tablet: 1 tab(s) ( 150 mg ), PO, q72hr, PRN: itching, # 2 tab(s), 1 Refill(s), Type: Maintenance, Pharmacy: [id number], 1 tab(s) po q72 hrs,PRN:itching.     Diagnosis     Acquired hypothyroidism (BNY38-IB E03.4).     Orders     Orders (Selected)   Outpatient Orders  Ordered  Return to Clinic (Request): RFV: lab visit for TSH, Return in 4 weeks  Prescriptions  Prescribed  levothyroxine 75 mcg (0.075 mg) oral tablet: 1 tab(s) ( 75 mcg ), po, daily, # 90 tab(s), 0 Refill(s), Type: Maintenance, Pharmacy: [id number], 1 tab(s) po daily.

## 2022-02-16 NOTE — PROGRESS NOTES
"   Patient:   MARLEN ARDON            MRN: [id number]            FIN: [id number]               Age:   50 years     Sex:  Female     :  1970   Associated Diagnoses:   Acquired hypothyroidism; Goiter; Diverticulitis   Author:   Justina Laws MD      Visit Information      Date of Service: 10/07/2020 11:22 am  Performing Location: [facility]  Encounter#: [id number]      Primary Care Provider (PCP):  Justina Laws MD    NPI# [id number]      Referring Provider:  Justina Laws MD    NPI# [id number]      Chief Complaint   10/7/2020 11:31 AM CDT   C/o goiter concerns-has had the goiter for \"awhile.\" Having difficulty talking/ breathing.        History of Present Illness   1. patient with ongoing sense of swelling in neck, sometimes feels like there is foreign body, seems to be persistent, had recent thyroid testing that is normal  2. patient with recurrence of diverticulitis, seen in clinic , was out of work for a week, has intermittent episodes of these throughout the year, can go several months without symptoms but when she has issues has significant diarrhea  3. reviewed phq-9, patient with increased symptoms, has been more stressed, mood has been down, had previously done well on escitalopram, would like to resume medication      Review of Systems   Constitutional:  Fatigue, No fever.    Ear/Nose/Mouth/Throat:  No nasal congestion, No sore throat.    Respiratory:  No shortness of breath.    Gastrointestinal:  Nausea, Diarrhea.         Abdominal pain: LLQ and suprapubic.       Health Status   Allergies:    Allergic Reactions (All)  Severity Not Documented  Doxycycline (Ill)  Naproxen (No reactions were documented)  Sulfa drug (Hives)   Medications:  (Selected)   Prescriptions  Prescribed  Lexapro 10 mg oral tablet: = 1 tab(s) ( 10 mg ), PO, Daily, # 90 tab(s), 1 Refill(s), Type: Maintenance, Pharmacy: [facility], 1 tab(s) Oral daily, 62.5, in, 10/07/20 11:31:00 CDT, Height " Measured, 149, lb, 10/07/20 11:31:00 CDT, Weight Measured  Magic mouthwash: Magic mouthwash, 10 ml QID PRN, Oral, qid, Instructions: 1 Part viscous lidocaine 2%;  1 Part Maalox;  1 Part diphenhydramine 12.5 mg per 5 ml elixir; 120 ml total, Supply, # 120 mL, 0 Refill(s), Type: Maintenance, Pharmacy: [facility], 10 ml QID...  levothyroxine 88 mcg (0.088 mg) oral tablet: = 1 tab(s) ( 88 mcg ), PO, Daily, # 90 tab(s), 1 Refill(s), Type: Maintenance, Pharmacy: [facility], 1 tab(s) Oral daily, 62.5, in, 07/13/20 10:35:00 CDT, Height Measured, 142, lb, 07/13/20 10:35:00 CDT, Weight Measured  Documented Medications  Documented  Calcium 600+D: 2 tab(s), po, daily, 0 Refill(s), Type: Maintenance  Dietary SUpplement-Acemannan: Dietary SUpplement-Acemannan, 0 Refill(s), Type: Maintenance   Problem list:    All Problems  Acquired hypothyroidism / SNOMED CT 954382228 / Confirmed  Anxiety Disorder / SNOMED CT [id number]-[id number] / Confirmed  Chronic insomnia / SNOMED CT 595824678 / Confirmed  Osteopenia / SNOMED CT 604755697 / Confirmed  Ovarian cyst / SNOMED CT 77IQ75M5-[id number] / Confirmed  Right ovary      Histories   Past Medical History:    Active  Ovarian cyst (SNOMED CT [id number])  Comments:  10/17/2013 CDT 11:40 AM JULIO CÉSAR - Yulisa Ray LPN  Right ovary   Family History:    MI  Father  Diabetes mellitus  Mother  Hypertension  Father     Procedure history:    Hysterectomy and unilateral salpingo-oophorectomy sample ([id number]) on 6/2/2005 at 34 Years.  Comments:  10/17/2013 11:39 AM CDT - Yulisa Ray LPN  L ov elisabet removed.   Social History:        Alcohol Assessment            1 x per month, 1 drinks/episode average.  2 drinks/episode maximum.      Tobacco Assessment: Past            Past                     Comments:                      12/30/2014 - Cory CHA, Yulisa                     Quit 3 yrs ago.      Home and Environment Assessment             Marital status:  x 2.      Nutrition and Health Assessment            Type of diet: Regular.      Exercise and Physical Activity Assessment: Regular exercise        Physical Examination   Vital Signs   10/7/2020 11:31 AM CDT Temperature Tympanic 98.2 DegF    Peripheral Pulse Rate 88 bpm    Systolic Blood Pressure 120 mmHg    Diastolic Blood Pressure 68 mmHg    Mean Arterial Pressure 85 mmHg    BP Site Right arm    BP Method Manual    Oxygen Saturation 98 %      Measurements from flowsheet : Measurements   10/7/2020 11:31 AM CDT Height Measured - Standard 62.5 in    Weight Measured - Standard 149 lb    BSA 1.72 m2    Body Mass Index 26.82 kg/m2  HI      General:  Alert and oriented, No acute distress.    Eye:  Pupils are equal, round and reactive to light, Normal conjunctiva.    HENT:  Normocephalic, Oral mucosa is moist.    Neck:  Supple, Non-tender, slight fullness to neck, no discrete nodules, no tenderness.    Respiratory:  Lungs are clear to auscultation.    Cardiovascular:  Normal rate, Regular rhythm.       Review / Management   Results review:  Lab results   9/28/2020 5:27 PM CDT T4 Free 1.4 ng/dL    TSH 0.99 mIU/L   9/28/2020 5:25 PM CDT Urine Culture See comment   9/28/2020 5:22 PM CDT UA pH 7.0    UA Specific Gravity 1.025    UA Glucose NEGATIVE    UA Bilirubin NEGATIVE    UA Ketones NEGATIVE    U Occult Blood NEGATIVE    UA Protein NEGATIVE    UA Nitrite NEGATIVE    UA Leuk Est NEGATIVE   9/28/2020 5:16 PM CDT WBC 6.0    RBC 4.83    Hgb 13.9 g/dL    Hct 41.7 %    MCV 86 fL    MCH 28.8 pg    MCHC 33.3 g/dL    RDW 14.1 %    Platelet 264    MPV 9.3 fL    Lymphs 27.7 %    Abs Lymphs 1.7    Neutrophils 60.2 %    Abs Neutrophils 3.6    Monocytes 9.6 %    Abs Monocytes 0.6    Eosinophils 2.2 %    Abs Eosinophils 0.1    Basophils 0.3 %    Abs Basophils 0.0   7/13/2020 11:12 AM CDT Glucose Level 91 mg/dL    Cholesterol 239 mg/dL  HI    Non-  HI    HDL 81 mg/dL    Chol/HDL Ratio 3.0       HI    Triglyceride 83 mg/dL    TSH 3.93 mIU/L   .       Impression and Plan   Diagnosis     Acquired hypothyroidism (BXV74-ZQ E03.4).     Goiter (HJK17-UK E04.9).     Course:  Not improving.    Plan:  will get patient set up for ultrasound to evaluate neck, if normal will refer on to ENT for further evaluation.    Orders     Orders (Selected)   Outpatient Orders  Ordered  US Thyroid (Request): Acquired hypothyroidism  Goiter.     Diagnosis     Moderate major depression (NZD20-EQ F32.1).     Course:  Worsening.    Orders     Orders (Selected)   Prescriptions  Prescribed  Lexapro 10 mg oral tablet: = 1 tab(s) ( 10 mg ), PO, Daily, # 90 tab(s), 1 Refill(s), Type: Maintenance, Pharmacy: [facility], 1 tab(s) Oral daily, 62.5, in, 10/07/20 11:31:00 CDT, Height Measured, 149, lb, 10/07/20 11:31:00 CDT, Weight Measured.     will resume escitalopram, if not improving in 3-4 weeks consider increase to alternate treatment.     Diagnosis     Diverticulitis (TSX72-OQ K57.92).     Course:  patient is improving, has had recurrent episodes of pain that improves with antibiotics, needs [place] paperwork filled out.

## 2022-02-16 NOTE — NURSING NOTE
Comprehensive Intake Entered On:  11/23/2021 1:59 PM CST    Performed On:  11/23/2021 1:55 PM CST by Cathy Bruno               Summary   Chief Complaint :   Here to fill out ppw for work    Menstrual Status :   Hysterectomy   Weight Measured :   143 lb(Converted to: 143 lb 0 oz, 64.864 kg)    Height Measured :   62.5 in(Converted to: 5 ft 2 in, 158.75 cm)    Body Mass Index :   25.74 kg/m2 (HI)    Body Surface Area :   1.69 m2   Systolic Blood Pressure :   124 mmHg   Diastolic Blood Pressure :   76 mmHg   Mean Arterial Pressure :   92 mmHg   Peripheral Pulse Rate :   110 bpm (HI)    BP Site :   Right arm   BP Method :   Manual   HR Method :   Electronic   Temperature Tympanic :   97.9 DegF(Converted to: 36.6 DegC)    Oxygen Saturation :   94 %   Cathy Bruno - 11/23/2021 1:55 PM CST   Health Status   Allergies Verified? :   Yes   Medication History Verified? :   Yes   Immunizations Current :   Other: Declined TDAP.   Medical History Verified? :   Yes   Pre-Visit Planning Status :   Completed   Tobacco Use? :   Former smoker   Cathy Bruno - 11/23/2021 1:55 PM CST   Consents   Consent for Immunization Exchange :   Consent Granted   Consent for Immunizations to Providers :   Consent Granted   Cathy Bruno - 11/23/2021 1:55 PM CST   Meds / Allergies   (As Of: 11/23/2021 1:59:59 PM CST)   Allergies (Active)   doxycycline  Estimated Onset Date:   Unspecified ; Reactions:   Ill ; Created By:   Nena Edouard CMA; Reaction Status:   Active ; Category:   Drug ; Substance:   doxycycline ; Type:   Allergy ; Updated By:   Nena Edouard CMA; Reviewed Date:   11/23/2021 1:58 PM CST      naproxen  Estimated Onset Date:   Unspecified ; Created By:   Yulisa Ray LPN; Reaction Status:   Active ; Category:   Drug ; Substance:   naproxen ; Type:   Allergy ; Updated By:   Yulisa Ray LPN; Reviewed Date:   11/23/2021 1:58 PM CST      sulfa drug  Estimated Onset Date:   Unspecified ; Reactions:   Hives ;  Created By:   Yulisa Ray LPN; Reaction Status:   Active ; Category:   Drug ; Substance:   sulfa drug ; Type:   Allergy ; Updated By:   Yulisa Ray LPN; Reviewed Date:   11/23/2021 1:58 PM CST        Medication List   (As Of: 11/23/2021 1:59:59 PM CST)   Prescription/Discharge Order    albuterol  :   albuterol ; Status:   Prescribed ; Ordered As Mnemonic:   Albuterol (Eqv-ProAir HFA) 90 mcg/inh inhalation aerosol ; Simple Display Line:   2 puff(s), Inhale, q6 hrs, 6.7 gm, 0 Refill(s) ; Ordering Provider:   Jurgen Potts PA-C; Catalog Code:   albuterol ; Order Dt/Tm:   10/11/2021 4:46:00 PM CDT          amoxicillin-clavulanate  :   amoxicillin-clavulanate ; Status:   Prescribed ; Ordered As Mnemonic:   amoxicillin-clavulanate 875 mg-125 mg oral tablet ; Simple Display Line:   1 tab(s), Oral, q12 hrs, for 10 day(s), 20 tab(s), 0 Refill(s) ; Ordering Provider:   Jurgen Potts PA-C; Catalog Code:   amoxicillin-clavulanate ; Order Dt/Tm:   11/16/2021 10:13:09 AM CST          fluticasone  :   fluticasone ; Status:   Prescribed ; Ordered As Mnemonic:   Flovent  mcg/inh inhalation aerosol ; Simple Display Line:   220 mcg, Inhale, bid, 1 EA, 0 Refill(s) ; Ordering Provider:   Justina Laws MD; Catalog Code:   fluticasone ; Order Dt/Tm:   10/27/2021 1:33:36 PM CDT          levothyroxine  :   levothyroxine ; Status:   Prescribed ; Ordered As Mnemonic:   levothyroxine 88 mcg (0.088 mg) oral tablet ; Simple Display Line:   1 tab(s), Oral, daily, INCREASE., 90 tab(s), 2 Refill(s) ; Ordering Provider:   Justina Laws MD; Catalog Code:   levothyroxine ; Order Dt/Tm:   12/23/2020 4:32:41 PM CST            Home Meds    calcium-vitamin D  :   calcium-vitamin D ; Status:   Documented ; Ordered As Mnemonic:   Calcium 600+D ; Simple Display Line:   2 tab(s), po, daily ; Catalog Code:   calcium-vitamin D ; Order Dt/Tm:   8/4/2014 9:31:21 AM CDT          docusate  :   docusate ; Status:   Documented ; Ordered As  Mnemonic:   Colace 50 mg oral capsule ; Simple Display Line:   50 mg, 1 cap(s), Oral, bid, 0 Refill(s) ; Catalog Code:   docusate ; Order Dt/Tm:   6/23/2021 1:39:09 PM CDT

## 2022-02-16 NOTE — RESULTS
Patient:   MARLEN ARDON            MRN: [id number]            FIN: [id number]               Age:   51 years     Sex:  Female     :  1970   Associated Diagnoses:   None   Author:   Bradford Hameed MD      Procedure   EKG procedure   Date/ Time:  2021 2:19:00 PM.     EKG findings   Interpretation: Bradford Hameed MD.     Rhythm: heart rate  106  beats/min.     Interpretation: sinus tachcardia with  st t wave depression in V3 to V5.

## 2022-02-16 NOTE — NURSING NOTE
Comprehensive Intake Entered On:  7/13/2020 10:39 AM CDT    Performed On:  7/13/2020 10:35 AM CDT by Kim Colby CMA               Summary   Chief Complaint :   discuss TSH, recheck TSH, fasting for labs   Menstrual Status :   Hysterectomy   Weight Measured :   142 lb(Converted to: 142 lb 0 oz, 64.41 kg)    Height Measured :   62.5 in(Converted to: 5 ft 2 in, 158.75 cm)    Body Mass Index :   25.56 kg/m2 (HI)    Body Surface Area :   1.68 m2   Systolic Blood Pressure :   100 mmHg   Diastolic Blood Pressure :   70 mmHg   Mean Arterial Pressure :   80 mmHg   Peripheral Pulse Rate :   66 bpm   BP Method :   Manual   HR Method :   Manual   Kim Colby CMA - 7/13/2020 10:35 AM CDT   Health Status   Allergies Verified? :   Yes   Medication History Verified? :   Yes   Immunizations Current :   Other: Declined TDAP.   Medical History Verified? :   Yes   Pre-Visit Planning Status :   Completed   Kim Colby CMA - 7/13/2020 10:35 AM CDT   Consents   Consent for Immunization Exchange :   Consent Granted   Consent for Immunizations to Providers :   Consent Granted   Kim Colby CMA - 7/13/2020 10:35 AM CDT   Meds / Allergies   (As Of: 7/13/2020 10:39:57 AM CDT)   Allergies (Active)   doxycycline  Estimated Onset Date:   Unspecified ; Reactions:   Ill ; Created By:   Nena Edouard CMA; Reaction Status:   Active ; Category:   Drug ; Substance:   doxycycline ; Type:   Allergy ; Updated By:   Nena Edouard CMA; Reviewed Date:   7/13/2020 10:38 AM CDT      naproxen  Estimated Onset Date:   Unspecified ; Created By:   Yulisa Ray LPN; Reaction Status:   Active ; Category:   Drug ; Substance:   naproxen ; Type:   Allergy ; Updated By:   Yulisa Ray LPN; Reviewed Date:   7/13/2020 10:38 AM CDT      sulfa drug  Estimated Onset Date:   Unspecified ; Reactions:   Hives ; Created By:   Yulisa Ray LPN; Reaction Status:   Active ; Category:   Drug ; Substance:   sulfa drug ; Type:   Allergy ; Updated By:   Cory CHA  Yulisa; Reviewed Date:   7/13/2020 10:38 AM CDT        Medication List   (As Of: 7/13/2020 10:39:57 AM CDT)   Prescription/Discharge Order    levothyroxine  :   levothyroxine ; Status:   Prescribed ; Ordered As Mnemonic:   levothyroxine 75 mcg (0.075 mg) oral tablet ; Simple Display Line:   75 mcg, 1 tab(s), po, daily, 90 tab(s), 1 Refill(s) ; Ordering Provider:   Justina Laws MD; Catalog Code:   levothyroxine ; Order Dt/Tm:   4/20/2020 4:09:26 PM CDT          Miscellaneous Rx Supply  :   Miscellaneous Rx Supply ; Status:   Prescribed ; Ordered As Mnemonic:   Magic mouthwash ; Simple Display Line:   10 ml QID PRN, Oral, qid, 1 Part viscous lidocaine 2%;  1 Part Maalox;  1 Part diphenhydramine 12.5 mg per 5 ml elixir; 120 ml total, 120 mL, 0 Refill(s) ; Ordering Provider:   Jurgen Potts PA-C; Catalog Code:   Miscellaneous Rx Supply ; Order Dt/Tm:   5/29/2020 11:20:46 AM CDT            Home Meds    calcium-vitamin D  :   calcium-vitamin D ; Status:   Documented ; Ordered As Mnemonic:   Calcium 600+D ; Simple Display Line:   2 tab(s), po, daily ; Catalog Code:   calcium-vitamin D ; Order Dt/Tm:   8/4/2014 9:31:21 AM CDT            ID Risk Screen   Recent Travel History :   No recent travel   Family Member Travel History :   No recent travel   Other Exposure to Infectious Disease :   Unknown   Kim Colby CMA 7/13/2020 10:35 AM CDT

## 2022-02-16 NOTE — TELEPHONE ENCOUNTER
---------------------  From: Jurgen Potts PA-C   To: [facility] Message Pool ([id number]);     Sent: 11/1/2021 10:02:55 AM CDT  Subject: General Message     We could do this virtually if OK with the patient    KRYSTIN---------------------  From: Apryl Jovel MA ([facility] Message Pool ([id number]))   To: Juregn Potts PA-C;     Sent: 11/1/2021 12:31:21 PM CDT  Subject: RE: General Message     What are we scheduling for?---------------------  From: Jurgen Potts PA-C   To: [facility] Message Pool ([id number]);     Sent: 11/1/2021 12:40:30 PM CDT  Subject: RE: General Message     She is on the schedule at 1340 for FU Covid.    KAHPt seen in clinic for f/u per KWL.
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
## 2022-02-16 NOTE — PROGRESS NOTES
Patient:   MARLEN ARDON            MRN: [id number]            FIN: [id number]               Age:   50 years     Sex:  Female     :  1970   Associated Diagnoses:   Shingles rash; Chronic diarrhea; Chronic abdominal pain   Author:   Justina Laws MD      Visit Information      Date of Service: 2021 10:56 am  Performing Location: [facility]  Encounter#: [id number]      Primary Care Provider (PCP):  Justina Laws MD    NPI# [id number]      Referring Provider:  Justina Laws MD    NPI# [id number]      Chief Complaint   2/3/2021 11:09 AM CST    C/o possible shingles on R flank. Noticed yesterday. Painful.      History of Present Illness   Presents today with 1 day history of itchy rash on right flank.  Started yesterday, no prodrome of painful or burning prior to rash onset.  Denies similar rash in other areas.  No history of shingles vaccine.    FMLA:  Patient would like to update the frequency of GI episodes on FLMA form for work to support the recent increase in symptoms and absences.   Increased anxiety about situation at work, patient states that this anxiety make increasing the frequency of ther symptoms.      Review of Systems   Constitutional          All other systems reviewed and negative      Health Status   Allergies:    Allergic Reactions (Selected)  Severity Not Documented  Doxycycline (Ill)  Naproxen (No reactions were documented)  Sulfa drug (Hives)   Problem list:    All Problems  Anxiety Disorder / SNOMED CT [id number] / Confirmed  Acquired hypothyroidism / SNOMED CT 767165118 / Confirmed  Chronic insomnia / SNOMED CT 239389809 / Confirmed  Osteopenia / SNOMED CT 372443440 / Confirmed  Ovarian cyst / SNOMED CT [id number] / Confirmed   Medications:  (Selected)   Prescriptions  Prescribed  Lexapro 10 mg oral tablet: = 1 tab(s) ( 10 mg ), PO, Daily, # 90 tab(s), 1 Refill(s), Type: Maintenance, Pharmacy: [facility], 1 tab(s) Oral daily, 62.5, in, 10/07/20 11:31:00 CDT, Height Measured, 149, lb, 10/07/20 11:31:00 CDT, Weight Measured  Valtrex 1 g oral tablet: = 1 tab(s) ( 1 gm ), Oral, q8 hrs, x 7 day(s), # 21 tab(s), 0 Refill(s), Type: Acute, Pharmacy: [facility], 1 tab(s) Oral q8 hrs,x7 day(s), 62.5, in, 02/03/21 11:09:00 CST, Height Measured, 140, lb, 02/03/21 11:09:00 CST, Weight Measured  levothyroxine 88 mcg (0.088 mg) oral tablet: = 1 tab(s), Oral, daily, Instructions: INCREASE., # 90 tab(s), 2 Refill(s), Type: Maintenance, Pharmacy: [facility], TAKE 1 TABLET BY MOUTH ONCE DAILY ( INCREASE ), 62.5, in, 10/07/20 11:31:00 CDT, Height Measured, 149, lb, 10/07/20 11:31:00 CDT,...  Documented Medications  Documented  Calcium 600+D: 2 tab(s), po, daily, 0 Refill(s), Type: Maintenance  Dietary SUpplement-Acemannan: Dietary SUpplement-Acemannan, 0 Refill(s), Type: Maintenance,    Medications          *denotes recorded medication          *Dietary SUpplement-Acemannan: 0 Refill(s).          *Calcium 600+D: 2 tab(s), po, daily.          Lexapro 10 mg oral tablet: 10 mg, 1 tab(s), PO, Daily, 90 tab(s), 1 Refill(s).          levothyroxine 88 mcg (0.088 mg) oral tablet: 1 tab(s), Oral, daily, INCREASE., 90 tab(s), 2 Refill(s).          Valtrex 1 g oral tablet: 1 gm, 1 tab(s), Oral, q8 hrs, for 7 day(s), 21 tab(s), 0 Refill(s).          Histories   Past Medical History:    Active  Ovarian cyst ([id number])  Comments:  10/17/2013 CDT 11:40 AM CDT - Yulisa Ray LPN  Right ovary   Family History:    MI  Father  Diabetes mellitus  Mother  Hypertension  Father     Procedure history:    Hysterectomy and unilateral salpingo-oophorectomy sample ([id number]) on 6/2/2005 at 34 Years.  Comments:  10/17/2013 11:39 AM CDT - Yulisa Ray LPN  L ov elisabet removed.   Social History:        Electronic Cigarette/Vaping Assessment: Denies Electronic Cigarette Use            Electronic Cigarette Use: Never.       Alcohol Assessment            1 x per month, 1 drinks/episode average.  2 drinks/episode maximum.      Tobacco Assessment: Past            Former smoker, quit more than 30 days ago      Home and Environment Assessment            Marital status:  x 2.      Nutrition and Health Assessment            Type of diet: Regular.      Exercise and Physical Activity Assessment: Regular exercise        Physical Examination   Vital Signs   2/3/2021 11:09 AM CST Temperature Tympanic 96.4 DegF  LOW    Peripheral Pulse Rate 119 bpm  HI    Systolic Blood Pressure 110 mmHg    Diastolic Blood Pressure 74 mmHg    Mean Arterial Pressure 86 mmHg    BP Site Right arm    BP Method Manual    Oxygen Saturation 98 %      Measurements from flowsheet : Measurements   2/3/2021 11:09 AM CST Height Measured - Standard 62.5 in    Weight Measured - Standard 140 lb    BSA 1.67 m2    Body Mass Index 25.2 kg/m2  HI      General:  Alert and oriented, No acute distress.    Respiratory:  Breath sounds are equal, Symmetrical chest wall expansion.         Respirations: Are within normal limits.         Pattern: Regular.         Breath sounds: Bilateral, Within normal limits.    Cardiovascular:  Normal rate, Regular rhythm, No murmur, Good pulses equal in all extremities, Normal peripheral perfusion, No edema.    Gastrointestinal:  Soft, Non-tender, Non-distended, Normal bowel sounds, No organomegaly.    Skin: 2cm diameter red rash with multiple vesicular appearing lesions on right flank      Review / Management   Results review:  Lab results   1/21/2021 11:46 AM CST Sodium Level 140 mmol/L    Potassium Level 4.7 mmol/L    Chloride Level 105 mmol/L    CO2 Level 28 mmol/L    AGAP 7    Glucose Level 94 mg/dL    BUN 19 mg/dL    Creatinine 0.80 mg/dL    BUN/Creat Ratio 24    eGFR  >60    eGFR Non-African American >60    Calcium Level 9.4 mg/dL    Bili Total 0.5 mg/dL    Bili Direct 0.2 mg/dL    Bili Indirect 0.3 mg/dL    Alk Phos 117  IU/L    AST/SGOT 29 IU/L    ALT/SGPT 48 IU/L    Protein Total 7.7 g/dL    Albumin Level 4.6 g/dL    Globulin 3.1 g/dL    A/G Ratio 1.5    Amylase Level 90 IU/L    WBC 5.5    RBC 5.01    Hgb 14.4 g/dL    Hct 43.0 %    MCV 86 fL    MCH 28.7 pg    MCHC 33.5 g/dL    RDW 13.8 %    Platelet 236    MPV 9.4 fL    Lymphs 26.1 %    Abs Lymphs 1.4    Neutrophils 63.5 %    Abs Neutrophils 3.5    Monocytes 8.4 %    Abs Monocytes 0.5    Eosinophils 1.6 %    Abs Eosinophils 0.1    Basophils 0.4 %    Abs Basophils 0.0   .       Impression and Plan   Diagnosis     Shingles rash (OZZ41-RI B02.9).     Plan:  Take valtrex as prescribed. Symptoms are mild. Patient should reach out if any new concerns  .    Orders     Orders (Selected)   Prescriptions  Prescribed  Valtrex 1 g oral tablet: = 1 tab(s) ( 1 gm ), Oral, q8 hrs, x 7 day(s), # 21 tab(s), 0 Refill(s), Type: Acute, Pharmacy: [facility], 1 tab(s) Oral q8 hrs,x7 day(s), 62.5, in, 02/03/21 11:09:00 CST, Height Measured, 140, lb, 02/03/21 11:09:00 CST, Weight Measured.       Patient was seen with student Abebe Walls, MS4,and history and exam confirmed personally by me. Agree that documentation reflects findings and plan. I completed medical decision making after discussion with student and with patient.  Justina Laws MD    Diagnosis     Chronic diarrhea (WLQ48-YA K52.9).     Chronic abdominal pain (CUT23-LT R10.9).     Course:  patient with ongoing episodes of diarrhea, has missed 3 days this week and also last week. Has GI consult scheduled next week. Encouraged to keep that appointment. New [place] papers provided with updated absense issues. Reviewed recent labs and CT which do not reveal cause..

## 2022-02-16 NOTE — PROGRESS NOTES
Chief Complaint    Pt c/o her ears hurting. 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  History of Present Illness      pt has noticed that when she is working with a particular ice skate it causes her to have increased ur irritation and now has ear pain after working with these skate, wind blew the drilled shavings into pts face and caused irritation  Review of Systems      neg except as per HPI  Physical Exam   Vitals & Measurements    T: 98.1  F (Oral)  HR: 92 (Peripheral)  RR: 16  BP: 104/70  SpO2: 100%     WT: 142.6 lb       bilateral TM show some air fluid levels, no redness, also + pharyngeal cobblestoning  Assessment/Plan       Dust storm, initial encounter (X37.3XXA)        would like pt to use mask when working with this particular product, will use some prednisone to decrease the inflammation, and rtc if sx worsen or do not improve         Ordered:          predniSONE, = 1 tab(s) ( 20 mg ), Oral, daily, x 7 day(s), # 7 tab(s), 1 Refill(s), Type: Acute, Pharmacy: [facility], 1 tab(s) Oral daily,x7 day(s), 62.5, in, 08/12/21 12:34:00 CDT, Height Measured, 142.6, lb, 09/09/21 9:42:00 CDT, Weight Measured, (Ordered)           Patient Information     Name:MARLEN ARDON      Address:      [address] [place] [id number]     Sex:Female     YOB: 1970     [phone]     Emergency Contact:TEJAL NAVA     MRN:[id number]     FIN:[id number]     Location:[facility]     Date of Service:09/09/2021      Primary Care Physician:       Justina Laws MD, [phone]      Attending Physician:       Jerri Castro MD, [phone]  Problem List/Past Medical History    Ongoing     Acquired hypothyroidism     Anxiety Disorder     Chronic insomnia     Osteopenia     Ovarian cyst       Comments: Right ovary     Unspecified abdominal pain    Historical     No qualifying data  Procedure/Surgical History      Colonoscopy (03/26/2021)            Comments: Indication: Left lower quadrant abdominal pain.      Sedation: Versed, Fentanyl.      Findings: Normal with the exception of severe diverticulosis in descending and sigmoid colon..     Hysterectomy and unilateral salpingo-oophorectomy sample (06/02/2005)            Comments: L ov elisabet removed..  Medications    Calcium 600+D, 2 tab(s), Oral, daily    Colace 50 mg oral capsule, 50 mg= 1 cap(s), Oral, bid    escitalopram 10 mg oral tablet, 1 tab(s), Oral, daily    levothyroxine 88 mcg (0.088 mg) oral tablet, 1 tab(s), Oral, daily    predniSONE 20 mg oral tablet, 20 mg= 1 tab(s), Oral, daily, 1 refills  Allergies    doxycycline (Ill)    naproxen    sulfa drug (Hives)  Social History    Smoking Status     Former smoker     Alcohol      1 x per month, 1 drinks/episode average. 2 drinks/episode maximum.     Electronic Cigarette/Vaping - Denies Electronic Cigarette Use      Electronic Cigarette Use: Never.     Exercise - Regular exercise     Home/Environment      Marital status:  x 2.     Nutrition/Health      Type of diet: Regular.     Tobacco - Past      Former smoker, quit more than 30 days ago  Family History    Diabetes mellitus: Mother.    Hypertension: Father.    MI: Father.  Lab Results       Lab Results (Last 4 results within 90 days)        UA Color: YELLOW (06/23/21 14:50:00)       UA Appear: CLEAR (06/23/21 14:50:00)       UA pH: 5.5 [5  - 8] (06/23/21 14:50:00)       UA Specific Gravity: 1.017 [1.001  - 1.035] (06/23/21 14:50:00)       UA Glucose: NEGATIVE [NEGATIVE  - NEGATIVE] (06/23/21 14:50:00)       UA Bilirubin: NEGATIVE [NEGATIVE  - NEGATIVE] (06/23/21 14:50:00)       UA Ketones: NEGATIVE [NEGATIVE  - NEGATIVE] (06/23/21 14:50:00)       UA Blood: NEGATIVE [NEGATIVE  - NEGATIVE] (06/23/21 14:50:00)       UA Protein: NEGATIVE [NEGATIVE  - NEGATIVE] (06/23/21 14:50:00)       UA Nitrite: NEGATIVE [NEGATIVE  - NEGATIVE] (06/23/21 14:50:00)       UA  Leukocyte Esterase: NEGATIVE [NEGATIVE  - NEGATIVE] (06/23/21 14:50:00)       Urine Culture: See comment (06/23/21 14:50:00)  Immunizations       Scheduled Immunizations       Dose Date(s)       human papillomavirus vaccine       09/22/2006, 04/17/2008, 09/04/2008       Td       08/25/2004

## 2022-02-16 NOTE — PROGRESS NOTES
Chief Complaint    Concerns with abd pain on/off x 1 month. Denies nausea, vomiting or diarrhea. BMs ranges from 1-3 days. Also discuss neck goiter.  History of Present Illness          1. Chief complaint as above reviewed and confirmed with patient.  Pt presents to the clinic with concerns re: abdominal pain L lower.  Sx present for the last 1 month.  Has a hx of diverticulitis.  Seems similar type of pain but not as severe.  Waxing and waning for 1 month, worse today. no vomiting.  Tolerating po well.  Has had some loose stools. Not frequent. No blood.  No fevers or chills.  General malaise.       no weight loss.       Last episode was April 2020(empiric treatment, improved)  First episode was March 2019 (based on CT)      Has not had colonoscopy.       No urinary frequency, hematuria, dysuria but does have urgency.  Has had hysterectomy and L oophorectomy.      2. goiter: Synthroid last adjusted by pcp (increased dose) in April 2020 and seem to decrease in size, now worsening again with increased size.  Not painful.  Has fatigue, hoarseness, palpitations at times.   Physical Exam   Vitals & Measurements    T: 97.9  F (Tympanic)  HR: 92 (Peripheral)  BP: 108/74     WT: 148 lb               Vitals as above per nursing documentation           Constitutional : nad appears well          Throat: pharynx is nonerythematous, no tonsillar hypertrophy, no exudate           Neck: neck supple, no adenopathy, diffuse thyromegaly no nodules appreciated, no rigidity           Lungs: lungs CTA', no Wheezes, rhonchi or rales           Heart: heart RRR, nl S1, S2 no murmur           skin:  No rashes        Abdomen: BS active, tender LLQ and suprapubic area.           UA and CBC are WNL  Assessment/Plan       Abdominal pain (R10.9)         offered CT vs emperic treatment.  decided to treat empirically for diverticulitis with Augmentin  (did not tolerate metallic taste of metronidazole last time).  Clear/full liquids, advance to  soft foods.  will touch base later in the week if not improving in the next 48-72 hours consider CT scan.          Ordered:          CBC w/ Diff/Plt (Request), Priority: Urgent, Hypothyroidism  Abdominal pain          Culture, Urine, Routine* ([facility]), Specimen Type: Urine (Clean Catch), Collection Date: 09/28/20 17:02:00 CDT          POC URINALYSIS, UA* ([facility]), Specimen Type: Urine, Collection Date: 09/28/20 17:01:00 CDT                Hypothyroidism (E03.9)         check TSH/T4. Consider US if in the normal range.         Ordered:          CBC w/ Diff/Plt (Request), Priority: Urgent, Hypothyroidism  Abdominal pain          Culture, Urine, Routine* ([facility]), Specimen Type: Urine (Clean Catch), Collection Date: 09/28/20 17:02:00 CDT          T4, free* ([facility]), Specimen Type: Serum, Collection Date: 09/28/20 16:48:00 CDT          TSH* ([facility]), Specimen Type: Serum, Collection Date: 09/28/20 16:48:00 CDT                Orders:         amoxicillin-clavulanate, = 1 tab(s), Oral, q8 hrs, x 10 day(s), # 30 tab(s), 0 Refill(s), Type: Acute, Pharmacy: [facility], please disregarder previous order for metronidazole, 1 tab(s) Oral q8 hrs,x10 day(s), 62.5, in, 08/25/20 9:56:00 CDT, Height Measured, 148, lb, 09/..., (Ordered)  Patient Information     Name:MARLEN ARDON      Address:      [address] [place] [id number]     Sex:Female     YOB: 1970     [phone]     Emergency Contact:TEJAL NAVA     MRN:[id number]     FIN:[id number]     Location:[facility]     Date of Service:09/28/2020      Primary Care Physician:       Justina Laws MD, [phone]      Attending Physician:       Rakel Canseco PA-C, [phone]  Problem List/Past Medical History    Ongoing     Acquired hypothyroidism     Anxiety Disorder     Chronic insomnia     Osteopenia     Ovarian cyst       Comments: Right ovary    Historical     No qualifying  data  Procedure/Surgical History     Hysterectomy and unilateral salpingo-oophorectomy sample (06/02/2005)      Comments: L ov elisabet removed..  Medications    Augmentin 875 mg-125 mg oral tablet, 1 tab(s), Oral, q8 hrs    Calcium 600+D, 2 tab(s), Oral, daily    Dietary SUpplement-Acemannan    levothyroxine 88 mcg (0.088 mg) oral tablet, 88 mcg= 1 tab(s), Oral, daily, 1 refills    Magic mouthwash, 10 ml QID PRN, Oral, qid    triamcinolone 0.1% topical cream, 1 anthony, Topical, tid  Allergies    doxycycline (Ill)    naproxen    sulfa drug (Hives)  Social History    Smoking Status     Former smoker     Alcohol      1 x per month, 1 drinks/episode average. 2 drinks/episode maximum.     Exercise - Regular exercise     Home/Environment      Marital status:  x 2.     Nutrition/Health      Type of diet: Regular.     Tobacco - Past      Past  Family History    Diabetes mellitus: Mother.    Hypertension: Father.    MI: Father.  Immunizations      Vaccine Date Status          human papillomavirus vaccine 09/04/2008 Recorded          human papillomavirus vaccine 04/17/2008 Recorded          human papillomavirus vaccine 09/22/2006 Recorded          Td 08/25/2004 Recorded  Lab Results       Lab Results (Last 4 results within 90 days)        Glucose Level: 91 mg/dL [65 mg/dL - 99 mg/dL] (07/13/20 11:12:00)       Cholesterol: 239 mg/dL High (07/13/20 11:12:00)       Non-HDL Cholesterol: 158 High (07/13/20 11:12:00)       HDL: 81 mg/dL (07/13/20 11:12:00)       Cholesterol/HDL Ratio: 3 (07/13/20 11:12:00)       LDL: 140 High (07/13/20 11:12:00)       Triglyceride: 83 mg/dL (07/13/20 11:12:00)       TSH: 3.93 mIU/L (07/13/20 11:12:00)       WBC: 6.0 [4.5  - 11] (09/28/20 17:16:00)       RBC: 4.83 [4  - 5.2] (09/28/20 17:16:00)       Hgb: 13.9 [12 g/dL - 16 g/dL] (09/28/20 17:16:00)       Hct: 41.7 [33 % - 51 %] (09/28/20 17:16:00)       MCV: 86 [80 fL - 100 fL] (09/28/20 17:16:00)       MCH: 28.8 [26 pg - 34 pg] (09/28/20  17:16:00)       MCHC: 33.3 [32 g/dL - 36 g/dL] (09/28/20 17:16:00)       RDW: 14.1 [11.5 % - 15.5 %] (09/28/20 17:16:00)       Platelet: 264 [140  - 440] (09/28/20 17:16:00)       MPV: 9.3 [6.5 fL - 11 fL] (09/28/20 17:16:00)       Lymphocytes: 27.7 (09/28/20 17:16:00)       Abs Lymphocytes: 1.7 [0.9  - 2.9] (09/28/20 17:16:00)       Neutrophils: 60.2 (09/28/20 17:16:00)       Abs Neutrophils: 3.6 [1.7  - 7] (09/28/20 17:16:00)       Monocytes: 9.6 (09/28/20 17:16:00)       Abs Monocytes: 0.6 (09/28/20 17:16:00)       Eosinophils: 2.2 (09/28/20 17:16:00)       Abs Eosinophils: 0.1 (09/28/20 17:16:00)       Basophils: 0.3 (09/28/20 17:16:00)       Abs Basophils: 0.0 (09/28/20 17:16:00)       UA pH: 7 [5  - 8] (09/28/20 17:22:00)       UA Specific Gravity: 1.025 [1.001  - 1.035] (09/28/20 17:22:00)       UA Glucose: NEGATIVE [NEGATIVE  - NEGATIVE] (09/28/20 17:22:00)       UA Bilirubin: NEGATIVE [NEGATIVE  - NEGATIVE] (09/28/20 17:22:00)       UA Ketones: NEGATIVE [NEGATIVE  - NEGATIVE] (09/28/20 17:22:00)       Urine Occult Blood: NEGATIVE [NEGATIVE  - NEGATIVE] (09/28/20 17:22:00)       UA Protein: NEGATIVE [NEGATIVE  - NEGATIVE] (09/28/20 17:22:00)       UA Nitrite: NEGATIVE [NEGATIVE  - NEGATIVE] (09/28/20 17:22:00)       UA Leukocyte Esterase: NEGATIVE [NEGATIVE  - NEGATIVE] (09/28/20 17:22:00)

## 2022-02-16 NOTE — TELEPHONE ENCOUNTER
---------------------  From: Aleida APPLE Alejandra   Sent: 9/29/2021 2:31:21 PM CDT  Subject: [facility] Testing     Pt was seen at [facility] for covid testing per Jurgen Potts. 02 Sat=98%. Pt resides in [facility]-will notify Public Health if positive.

## 2022-02-16 NOTE — PROGRESS NOTES
Patient:   MARLEN ARDON            MRN: [id number]            FIN: [id number]               Age:   51 years     Sex:  Female     :  1970   Associated Diagnoses:   None   Author:   Jurgen Potts PA-C       -   Today's date:  2021 1:52:00 PM .        -   To whom it may concern:        This patient is currently under my care.     Please excuse him/ her from work, for the next  5  days.

## 2022-02-16 NOTE — TELEPHONE ENCOUNTER
---------------------  From: Rosi Zimmerman LPN (Phone Messages Pool ([address] [place] [address]))   To: Advanced Practice Provider Pool ([address] [place] [address]);     Sent: 6/25/2021 8:37:54 AM CDT  Subject: UC results     [facility] out of clinic    Phone Message    PCP:   CHILANGO      Time of Call:  8:30am       Person Calling:  pt  Phone number:  [phone]    Note:   Pt calling asking if urine results are back.    Diagnosis     Acute cystitis (RMA74-WI N30.00).     Course:  may be related to bladder/urethra irritation from recent Monique use vs acute infection, will check UA and urine culture.       CULTURE, URINE, ROUTINE         Micro Number:      [id number]    Test Status:       Final    Specimen Source:   URINE    Specimen Quality:  Adequate    Result:            Growth of mixed laya was isolated, suggesting                       probable contamination. No further testing will                       be performed. If clinically indicated,                        recollection using a method to minimize                       contamination, with prompt transfer to Urine                       Culture Transport Tube, is recommended.  Lab test performed by:  Lab Mnemonic:[facility]  [facility]  [address] [place] [id number]  Nick Whitman M.D.  [facility] Specimen received date and time: 24-JUN-2021 02:16:00.0    Last office visit and reason:  6/23/21 Shoulder complaint, cystitis---------------------  From: Sam Iraheta PA-C (Advanced Practice Provider Pool ([address] [place] [address]))   To: Phone Messages Pool ([address] [place] [address]);     Sent: 6/25/2021 9:07:56 AM CDT  Subject: RE: UC results     UC shows no signs of infection, no antibiotic is neededLM for pt to call back.---------------------  From: Rosi Zimmerman LPN (Phone Messages Pool ([address] [place] [address]))   To: [facility] Message Pool ([address] [place] [address]);     Sent: 6/25/2021 9:33:36 AM CDT  Subject: FW: UC results     Pt informed. Pt asking about x-ray  results. Told her [facility] mailed out result letter and xray was normal.  Pt wondering why she constantly feels like she needs to urinate. Told pt per [facility] note she said it could have been irritation from hough use. Pt says she had hough way back in May. Pt wondering what to do.    Told pt [facility] out of clinic until 6/30 and that I will forward message.---------------------  From: Dary Pro CMA ([facility] Message Pool ([id number]))   To: Justina Laws MD;     Sent: 6/28/2021 7:45:59 AM CDT  Subject: FW:  resultsWould recommend next step be urogynecology referral to see if they can see a reason she is having ongoing symptoms.---------------------  From: Justina Laws MD   To: [employer] Message Pool ([id number]);     Sent: 6/29/2021 8:29:28 AM CDT  Subject: RE:  resultsLMTCB @ 327pmLMTCB.discussed with pt, states sx have calmed down, will plan to monitor and c/b if she would like to pursue referral

## 2022-02-16 NOTE — NURSING NOTE
Comprehensive Intake Entered On:  1/21/2021 10:47 AM CST    Performed On:  1/21/2021 10:43 AM CST by Dary Pro CMA               Summary   Chief Complaint :   Discuss R side abdominal pain-states that when she pressed her upper abdomen she feels pain in her back. Also c/o pelvic pain. Diarrhea/constipation, afebrile. Sx for 3 days.    Menstrual Status :   Hysterectomy   Weight Measured :   142 lb(Converted to: 142 lb 0 oz, 64.410 kg)    Height Measured :   62.5 in(Converted to: 5 ft 2 in, 158.75 cm)    Body Mass Index :   25.56 kg/m2 (HI)    Body Surface Area :   1.68 m2   Systolic Blood Pressure :   130 mmHg   Diastolic Blood Pressure :   74 mmHg   Mean Arterial Pressure :   93 mmHg   Peripheral Pulse Rate :   83 bpm   BP Site :   Right arm   BP Method :   Manual   Temperature Tympanic :   97.5 DegF(Converted to: 36.4 DegC)  (LOW)    Oxygen Saturation :   99 %   Pain Present :   Yes actual or suspected pain   Intensity :   8    Primary Pain Location :   Abdomen upper   Dary Pro CMA - 1/21/2021 10:43 AM CST   Health Status   Allergies Verified? :   Yes   Medication History Verified? :   Yes   Immunizations Current :   Other: Declined TDAP.   Pre-Visit Planning Status :   Completed   Tobacco Use? :   Former smoker   Dary Pro CMA - 1/21/2021 10:43 AM CST   Consents   Consent for Immunization Exchange :   Consent Granted   Consent for Immunizations to Providers :   Consent Granted   Dary Pro CMA - 1/21/2021 10:43 AM CST   Meds / Allergies   (As Of: 1/21/2021 10:47:36 AM CST)   Allergies (Active)   doxycycline  Estimated Onset Date:   Unspecified ; Reactions:   Ill ; Created By:   Nena Edouard CMA; Reaction Status:   Active ; Category:   Drug ; Substance:   doxycycline ; Type:   Allergy ; Updated By:   Nena Edouard CMA; Reviewed Date:   12/29/2020 10:48 AM CST      naproxen  Estimated Onset Date:   Unspecified ; Created By:   Yulisa Rya LPN; Reaction Status:   Active ; Category:   Drug ;  Substance:   naproxen ; Type:   Allergy ; Updated By:   Yulisa Ray LPN; Reviewed Date:   12/29/2020 10:48 AM CST      sulfa drug  Estimated Onset Date:   Unspecified ; Reactions:   Hives ; Created By:   Yulisa Ray LPN; Reaction Status:   Active ; Category:   Drug ; Substance:   sulfa drug ; Type:   Allergy ; Updated By:   Yulisa Ray LPN; Reviewed Date:   12/29/2020 10:48 AM CST        Medication List   (As Of: 1/21/2021 10:47:36 AM CST)   Prescription/Discharge Order    levothyroxine  :   levothyroxine ; Status:   Prescribed ; Ordered As Mnemonic:   levothyroxine 88 mcg (0.088 mg) oral tablet ; Simple Display Line:   1 tab(s), Oral, daily, INCREASE., 90 tab(s), 2 Refill(s) ; Ordering Provider:   Justina Laws MD; Catalog Code:   levothyroxine ; Order Dt/Tm:   12/23/2020 4:32:41 PM CST          escitalopram  :   escitalopram ; Status:   Prescribed ; Ordered As Mnemonic:   Lexapro 10 mg oral tablet ; Simple Display Line:   10 mg, 1 tab(s), PO, Daily, 90 tab(s), 1 Refill(s) ; Ordering Provider:   Justina Laws MD; Catalog Code:   escitalopram ; Order Dt/Tm:   10/7/2020 11:54:00 AM CDT            Home Meds    Miscellaneous Prescription  :   Miscellaneous Prescription ; Status:   Documented ; Ordered As Mnemonic:   Dietary SUpplement-Acemannan ; Simple Display Line:   0 Refill(s) ; Catalog Code:   Miscellaneous Prescription ; Order Dt/Tm:   8/25/2020 10:00:56 AM CDT          calcium-vitamin D  :   calcium-vitamin D ; Status:   Documented ; Ordered As Mnemonic:   Calcium 600+D ; Simple Display Line:   2 tab(s), po, daily ; Catalog Code:   calcium-vitamin D ; Order Dt/Tm:   8/4/2014 9:31:21 AM CDT            ID Risk Screen   Recent Travel History :   No recent travel   Family Member Travel History :   No recent travel   Other Exposure to Infectious Disease :   Unknown   Dary Pro CMA - 1/21/2021 10:43 AM CST

## 2022-02-16 NOTE — PROGRESS NOTES
Patient:   MARLEN ARDON            MRN: [id number]            FIN: [id number]               Age:   51 years     Sex:  Female     :  1970   Associated Diagnoses:   Tight chest; STOVER (dyspnea on exertion)   Author:   Jurgen Potts PA-C      Visit Information   Visit type:  General concerns.    Accompanied by:  No one.    Source of history:  Self, Medical record.    Referral source:  Self.    History limitation:  None.       Chief Complaint   2021 1:55 PM CST   Here to fill out ppw for work        History of Present Illness             The patient presents with dyspnea.  The dyspnea is described as breathlessness at rest, breathlessness on exertion and 7 weeks post C-19 diagnosis. Still battling STOVER, worse acutely today. Chest tightness. No fever. Feels flushed. CC above noted and confirmed with the patient..        Review of Systems   Constitutional:  Fatigue.    Eye:  Negative.    Ear/Nose/Mouth/Throat:  Negative.    Respiratory:  Negative except as documented in history of present illness.    Cardiovascular:  Negative except as documented in history of present illness.       Health Status   Allergies:    Allergic Reactions (Selected)  Severity Not Documented  Doxycycline (Ill)  Naproxen (No reactions were documented)  Sulfa drug (Hives)   Medications:  (Selected)   Prescriptions  Prescribed  Albuterol (Eqv-ProAir HFA) 90 mcg/inh inhalation aerosol: 2 puff(s), Inhale, q6 hrs, # 6.7 gm, 0 Refill(s), Type: Maintenance, Pharmacy: [facility], 2 puff(s) Inhale q6 hrs, 62.5, in, 10/11/21 16:26:00 CDT, Height Measured, 139.5, lb, 10/11/21 16:26:00 CDT, Weight Measured  Flovent  mcg/inh inhalation aerosol: ( 220 mcg ), Inhale, bid, # 1 EA, 0 Refill(s), Type: Maintenance, Pharmacy: [facility], 220 mcg Inhale bid, 62.5, in, 10/27/21 13:14:00 CDT, Height Measured, 140.7, lb, 10/27/21 13:14:00 CDT, Weight Measured  levothyroxine 88 mcg (0.088 mg) oral tablet: = 1 tab(s), Oral, daily,  Instructions: INCREASE., # 90 tab(s), 2 Refill(s), Type: Maintenance, Pharmacy: [facility], TAKE 1 TABLET BY MOUTH ONCE DAILY ( INCREASE ), 62.5, in, 10/07/20 11:31:00 CDT, Height Measured, 149, lb, 10/07/20 11:31:00 CDT,...  Documented Medications  Documented  Calcium 600+D: 2 tab(s), po, daily, 0 Refill(s), Type: Maintenance  Colace 50 mg oral capsule: = 1 cap(s) ( 50 mg ), Oral, bid, 0 Refill(s), Type: Maintenance   Problem list:    All Problems  Unspecified abdominal pain / SNOMED CT 12779306 / Confirmed  Ovarian cyst / SNOMED CT 68TZ06H6-QU00-2W0X-B54X-7JSN36N682DS / Confirmed  Osteopenia / SNOMED CT 001631328 / Confirmed  Chronic insomnia / SNOMED CT 814450706 / Confirmed  Anxiety Disorder / SNOMED CT 3MZ74D35-115V-93L9-[id number] / Confirmed  Acquired hypothyroidism / SNOMED CT 662915384 / Confirmed      Histories   Past Medical History:    Active  Ovarian cyst (14GF30F2-ZV71-4Z6X-[id number])  Comments:  10/17/2013 CDT 11:40 AM JULIO CÉSAR - Yulisa Ray LPN  Right ovary  Osteopenia (097097005)  Chronic insomnia (481975242)  Anxiety Disorder ([id number])  Acquired hypothyroidism (088506707)  Unspecified abdominal pain (65267972)   Family History:    MI  Father  Diabetes mellitus  Mother  Hypertension  Father     Procedure history:    Colonoscopy (328145540) on 3/26/2021 at 50 Years.  Comments:  4/15/2021 7:59 AM CDT - Rama Pemberton  Indication: Left lower quadrant abdominal pain.   Sedation: Versed, Fentanyl.  Findings: Normal with the exception of severe diverticulosis in descending and sigmoid colon.  Hysterectomy and unilateral salpingo-oophorectomy sample ([id number]) on 6/2/2005 at 34 Years.  Comments:  10/17/2013 11:39 AM DANIELT - Cory LPN, Yulisa  L ov elisabet removed.   Social History:        Electronic Cigarette/Vaping Assessment: Denies Electronic Cigarette Use            Electronic Cigarette Use: Never.      Alcohol Assessment            1 x per month, 1  drinks/episode average.  2 drinks/episode maximum.      Tobacco Assessment: Past            Former smoker, quit more than 30 days ago      Home and Environment Assessment            Marital status:  x 2.      Nutrition and Health Assessment            Type of diet: Regular.      Exercise and Physical Activity Assessment: Regular exercise        Physical Examination   Vital Signs   11/23/2021 1:55 PM CST Temperature Tympanic 97.9 DegF    Peripheral Pulse Rate 110 bpm  HI    HR Method Electronic    Systolic Blood Pressure 124 mmHg    Diastolic Blood Pressure 76 mmHg    Mean Arterial Pressure 92 mmHg    BP Site Right arm    BP Method Manual    Oxygen Saturation 94 %      Measurements from flowsheet : Measurements   11/23/2021 1:55 PM CST Height Measured - Standard 62.5 in    Weight Measured - Standard 143 lb    BSA 1.69 m2    Body Mass Index 25.74 kg/m2  HI      Respiratory:  Lungs are clear to auscultation, Respirations are non-labored.    Cardiovascular:  106  beats per minute, Regular rhythm.       Review / Management   ECG interpretation:  ST changes tachycardia.       Impression and Plan   Diagnosis     Tight chest (ACB91-UP R07.89).     STOVER (dyspnea on exertion) (MZL29-KG R06.00).     Patient Instructions:       Counseled: Patient, Regarding treatment.    Summary:  To ED They were notified. Pt declines ambulance..

## 2022-02-16 NOTE — PROGRESS NOTES
Patient:   MARLEN ARDON            MRN: [id number]            FIN: [id number]               Age:   51 years     Sex:  Female     :  1970   Associated Diagnoses:   None   Author:   Jurgen Potts PA-C       -   Today's date:  2021 2:43:00 PM .        -   To whom it may concern:        This patient is currently under my care.  He/ she may return to work, on  2021, with the following restrictions: work limited to  4  hours per day.  The patient is scheduled for follow-up care on  2021 2:44:00 PM.

## 2022-02-16 NOTE — NURSING NOTE
Comprehensive Intake Entered On:  11/16/2021 10:03 AM CST    Performed On:  11/16/2021 10:00 AM CST by Shaina Harrington CMA               Summary   Chief Complaint :   Sinus drainage, ear pain and ongoing cough, rib pain/muscle spasms   Menstrual Status :   Hysterectomy   Height Measured :   62.5 in(Converted to: 5 ft 2 in, 158.75 cm)    Systolic Blood Pressure :   120 mmHg   Diastolic Blood Pressure :   86 mmHg (HI)    Mean Arterial Pressure :   97 mmHg   Peripheral Pulse Rate :   92 bpm   BP Site :   Right arm   Pulse Site :   Radial artery   BP Method :   Electronic   HR Method :   Electronic   Temperature Tympanic :   97.0 DegF(Converted to: 36.1 DegC)  (LOW)    Oxygen Saturation :   99 %   Shaina Harrington CMA - 11/16/2021 10:00 AM CST   Consents   Consent for Immunization Exchange :   Consent Granted   Consent for Immunizations to Providers :   Consent Granted   Shaina Harrington CMA - 11/16/2021 10:00 AM CST   Meds / Allergies   (As Of: 11/16/2021 10:03:18 AM CST)   Allergies (Active)   doxycycline  Estimated Onset Date:   Unspecified ; Reactions:   Ill ; Created By:   Nena Edouard CMA; Reaction Status:   Active ; Category:   Drug ; Substance:   doxycycline ; Type:   Allergy ; Updated By:   Nena Edouard CMA; Reviewed Date:   11/16/2021 10:01 AM CST      naproxen  Estimated Onset Date:   Unspecified ; Created By:   Yulisa Ray LPN; Reaction Status:   Active ; Category:   Drug ; Substance:   naproxen ; Type:   Allergy ; Updated By:   Yulisa Ray LPN; Reviewed Date:   11/16/2021 10:01 AM CST      sulfa drug  Estimated Onset Date:   Unspecified ; Reactions:   Hives ; Created By:   Yulisa Ray LPN; Reaction Status:   Active ; Category:   Drug ; Substance:   sulfa drug ; Type:   Allergy ; Updated By:   Yulisa Ray LPN; Reviewed Date:   11/16/2021 10:01 AM CST        Medication List   (As Of: 11/16/2021 10:03:18 AM CST)   Prescription/Discharge Order    albuterol  :   albuterol ; Status:   Prescribed ;  Ordered As Mnemonic:   Albuterol (Eqv-ProAir HFA) 90 mcg/inh inhalation aerosol ; Simple Display Line:   2 puff(s), Inhale, q6 hrs, 6.7 gm, 0 Refill(s) ; Ordering Provider:   Jurgen Potts PA-C; Catalog Code:   albuterol ; Order Dt/Tm:   10/11/2021 4:46:00 PM CDT          fluticasone  :   fluticasone ; Status:   Prescribed ; Ordered As Mnemonic:   Flovent  mcg/inh inhalation aerosol ; Simple Display Line:   220 mcg, Inhale, bid, 1 EA, 0 Refill(s) ; Ordering Provider:   Justina Laws MD; Catalog Code:   fluticasone ; Order Dt/Tm:   10/27/2021 1:33:36 PM CDT          levothyroxine  :   levothyroxine ; Status:   Prescribed ; Ordered As Mnemonic:   levothyroxine 88 mcg (0.088 mg) oral tablet ; Simple Display Line:   1 tab(s), Oral, daily, INCREASE., 90 tab(s), 2 Refill(s) ; Ordering Provider:   Justina Laws MD; Catalog Code:   levothyroxine ; Order Dt/Tm:   12/23/2020 4:32:41 PM CST            Home Meds    calcium-vitamin D  :   calcium-vitamin D ; Status:   Documented ; Ordered As Mnemonic:   Calcium 600+D ; Simple Display Line:   2 tab(s), po, daily ; Catalog Code:   calcium-vitamin D ; Order Dt/Tm:   8/4/2014 9:31:21 AM CDT          docusate  :   docusate ; Status:   Documented ; Ordered As Mnemonic:   Colace 50 mg oral capsule ; Simple Display Line:   50 mg, 1 cap(s), Oral, bid, 0 Refill(s) ; Catalog Code:   docusate ; Order Dt/Tm:   6/23/2021 1:39:09 PM CDT            ID Risk Screen   Recent Travel History :   No recent travel   Family Member Travel History :   No recent travel   Other Exposure to Infectious Disease :   Unknown   COVID-19 Testing Status :   Positive COVID-19 test greater than 30 days ago   AndShaina rashid CMA - 11/16/2021 10:00 AM CST   Social History   Social History   (As Of: 11/16/2021 10:03:18 AM CST)   Alcohol:        1 x per month, 1 drinks/episode average.  2 drinks/episode maximum.   (Last Updated: 3/13/2017 3:39:56 PM CDT by Yulisa Ray LPN)          Tobacco:  Past       Former smoker, quit more than 30 days ago   (Last Updated: 12/29/2020 10:42:34 AM CST by Dary Pro CMA)          Electronic Cigarette/Vaping:  Denies Electronic Cigarette Use      Electronic Cigarette Use: Never.   (Last Updated: 12/29/2020 10:43:04 AM CST by Dary Pro CMA)          Home/Environment:        Marital status:  x 2.   (Last Updated: 9/18/2014 9:11:25 AM CDT by Rama Pemberton)          Nutrition/Health:        Type of diet: Regular.   (Last Updated: 10/17/2013 11:40:58 AM CDT by Yulisa Ray LPN)          Exercise:  Regular exercise      (Last Updated: 10/17/2013 11:40:49 AM CDT by Yulisa Ray LPN )           OB/GYN History   Menstrual Status :   Hysterectomy   Andert Shaina APPLE - 11/16/2021 10:00 AM CST   Pregnancy History   (As Of: 11/16/2021 10:03:18 AM CST)   No pregnancy history documented

## 2022-02-16 NOTE — PROGRESS NOTES
Patient:   MARLEN ARDON            MRN: [id number]            FIN: [id number]               Age:   51 years     Sex:  Female     :  1970   Associated Diagnoses:   None   Author:   Jurgen Potts PA-C       -   Today's date:  10/11/2021 4:41:00 PM .        -   To whom it may concern:        This patient is currently under my care and Was seen in my office on  10/11/2021 4:41:00 PM.  .  He/ she may return to work, on  10/14/2021, with the following restrictions: work limited to  4  hours per day, Restrictions until at least Oct 22. Patient is recovering from Covid..

## 2022-02-16 NOTE — PROGRESS NOTES
Patient:   MARLEN ARDON            MRN: [id number]            FIN: [id number]               Age:   50 years     Sex:  Female     :  1970   Associated Diagnoses:   History of diverticulitis; Recurrent abdominal pain   Author:   Justina Laws MD      Chief Complaint   2021 10:43 AM CST   Discuss R side abdominal pain-states that when she pressed her upper abdomen she feels pain in her back. Also c/o pelvic pain. Diarrhea/constipation, afebrile. Sx for 3 days.      History of Present Illness   Chief complaint and symptoms reviewed with patient and confirmed as above.  Patient with 3 days of abdominal pain, notes in right upper quadrant and radiating into midback  also noting some lower abdominal/pelvic discomfort  worse with eating  not vomiting  has been generally constipated but occasional loose stools as well  prior surgical history includes hysterectomy and L oophorectomy  still has gallbladder, appendix  history of diverticulitis         Health Status   Allergies:    Allergic Reactions (All)  Severity Not Documented  Doxycycline (Ill)  Naproxen (No reactions were documented)  Sulfa drug (Hives)   Medications:  (Selected)   Prescriptions  Prescribed  Lexapro 10 mg oral tablet: = 1 tab(s) ( 10 mg ), PO, Daily, # 90 tab(s), 1 Refill(s), Type: Maintenance, Pharmacy: [facility], 1 tab(s) Oral daily, 62.5, in, 10/07/20 11:31:00 CDT, Height Measured, 149, lb, 10/07/20 11:31:00 CDT, Weight Measured  levothyroxine 88 mcg (0.088 mg) oral tablet: = 1 tab(s), Oral, daily, Instructions: INCREASE., # 90 tab(s), 2 Refill(s), Type: Maintenance, Pharmacy: [facility], TAKE 1 TABLET BY MOUTH ONCE DAILY ( INCREASE ), 62.5, in, 10/07/20 11:31:00 CDT, Height Measured, 149, lb, 10/07/20 11:31:00 CDT,...  Documented Medications  Documented  Calcium 600+D: 2 tab(s), po, daily, 0 Refill(s), Type: Maintenance  Dietary SUpplement-Acemannan: Dietary SUpplement-Acemannan, 0 Refill(s), Type: Maintenance   Problem  list:    All Problems  Ovarian cyst / SNOMED CT [id number] / Confirmed  Right ovary  Osteopenia / SNOMED CT 745229532 / Confirmed  Chronic insomnia / SNOMED CT 971007256 / Confirmed  Anxiety Disorder / SNOMED CT [id number]-58G2-[id number] / Confirmed  Acquired hypothyroidism / SNOMED CT 848569477 / Confirmed      Histories   Past Medical History:    Active  Ovarian cyst (SNOMED CT 33NW90W4-[id number])  Comments:  10/17/2013 CDT 11:40 AM CDT - Yulisa Ray LPN  Right ovary   Family History:    MI  Father  Diabetes mellitus  Mother  Hypertension  Father     Procedure history:    Hysterectomy and unilateral salpingo-oophorectomy sample ([id number]) on 6/2/2005 at 34 Years.  Comments:  10/17/2013 11:39 AM CDT - Yulisa Ray LPN  L ov elisabet removed.      Physical Examination   Vital Signs   1/21/2021 10:43 AM CST Temperature Tympanic 97.5 DegF  LOW    Peripheral Pulse Rate 83 bpm    Systolic Blood Pressure 130 mmHg    Diastolic Blood Pressure 74 mmHg    Mean Arterial Pressure 93 mmHg    BP Site Right arm    BP Method Manual    Oxygen Saturation 99 %      Measurements from flowsheet : Measurements   1/21/2021 10:43 AM CST Height Measured - Standard 62.5 in    Weight Measured - Standard 142 lb    BSA 1.68 m2    Body Mass Index 25.56 kg/m2  HI      General:  Alert and oriented, Moderate distress.    Eye:  Normal conjunctiva.    HENT:  Normocephalic, Oral mucosa is moist.    Neck:  Supple, No lymphadenopathy.    Respiratory:  Lungs are clear to auscultation, Respirations are non-labored.    Cardiovascular:  Normal rate, Regular rhythm.    Gastrointestinal:  Soft, hypoactive bowel sounds, tender on right abdomen, worst right lower quadrant, guarding present, no rebound.       Review / Management   Results review:  All Results   1/21/2021 11:46 AM CST Sodium Level 140 mmol/L    Potassium Level 4.7 mmol/L    Chloride Level 105 mmol/L    CO2 Level 28 mmol/L    AGAP 7     Glucose Level 94 mg/dL    BUN 19 mg/dL    Creatinine Level 0.80 mg/dL    BUN/Creat Ratio 24    eGFR  >60    eGFR Non-African American >60    Calcium Level 9.4 mg/dL    Bilirubin Total 0.5 mg/dL    Bilirubin Direct 0.2 mg/dL    Bilirubin Indirect 0.3 mg/dL    Alkaline Phosphatase 117 IU/L    AST/SGOT 29 IU/L    ALT/SGPT 48 IU/L    Protein Total 7.7 g/dL    Albumin Level 4.6 g/dL    Globulin 3.1 g/dL    A/G Ratio 1.5    Amylase Level 90 IU/L    WBC 5.5    RBC 5.01    Hgb 14.4 g/dL    Hct 43.0 %    MCV 86 fL    MCH 28.7 pg    MCHC 33.5 g/dL    RDW 13.8 %    Platelet 236    MPV 9.4 fL    Lymphocytes 26.1 %    Abs Lymphocytes 1.4    Neutrophils 63.5 %    Abs Neutrophils 3.5    Monocytes 8.4 %    Abs Monocytes 0.5    Eosinophils 1.6 %    Abs Eosinophils 0.1    Basophils 0.4 %    Abs Basophils 0.0   .    Radiology results   Computed tomography, CT results reviewed and showed no evidence of cause of pain, diverticulosis present, no evidence of diverticulitis      Impression and Plan   Diagnosis     History of diverticulitis (XIJ23-BL Z87.19).     Recurrent abdominal pain (EOL30-TH R10.9).     Plan:  recurrent abdominal pain, CT is normal. Does have diverticulosis. Will start Augmentin as may be early diverticulitis. I recommended to the patient that we schedule her for consultation with GI given the recurrent episodes. May need colonoscopy. .    Orders     Orders (Selected)   Outpatient Orders  Completed  Referral (Request): 01/21/21 13:59:00 CST, Referred to: Gastroenterology, Acute abdominal pain  History of diverticulitis  Prescriptions  Prescribed  Augmentin 875 mg-125 mg oral tablet: = 1 tab(s), Oral, q12 hrs, x 10 day(s), # 20 tab(s), 0 Refill(s), Type: Acute, Pharmacy: [facility], 1 tab(s) Oral q12 hrs,x10 day(s), 62.5, in, 01/21/21 10:43:00 CST, Height Measured, 142, lb, 01/21/21 10:43:00 CST, Weight Measured.

## 2022-02-16 NOTE — TELEPHONE ENCOUNTER
---------------------  From: Bernice Garcia CMA (Phone Messages Pool ([id number]_[facility]))   To: [facility] Message Pool ([id number]);     Sent: 10/21/2021 1:17:52 PM CDT  Subject: update     Phone Message    PCP:   CHILANGO - asked for [facility]      Time of Call:  1309       Person Calling:  pt  Phone number:  [phone]    Returned call at: _    Note:   Pt states still issues with labored breathing with being active: stairs, extended walking, etc. Has been using the albuterol inhaler which helps some. Working 4 hours still but expires tomorrow. Wondering what the next step is. Ok to wait for KRYSTIN.    Last office visit and reason:  10/11/21 COVID f/u [facility]---------------------  From: Geeta Lee CMA ([employer] Message Pool ([id number]_[facility]))   To: Jurgen Potts PA-C;     Sent: 10/22/2021 7:07:05 AM CDT  Subject: FW: update---------------------  From: Jurgen Potts PA-C   To: [employer] Message Pool ([id number]_[facility]);     Sent: 10/22/2021 7:21:12 AM CDT  Subject: RE: update     See new note extending her restrictions.,    Anne Marie at , called and notified patient.

## 2022-02-16 NOTE — NURSING NOTE
Comprehensive Intake Entered On:  11/1/2021 1:40 PM CDT    Performed On:  11/1/2021 1:35 PM CDT by Med OLIVAREZ, Apryl               Summary   Chief Complaint :   f/u from last visit--voice hoarseness, muscle spasms below rib cage.   Menstrual Status :   Hysterectomy   Weight Measured :   139.8 lb(Converted to: 139 lb 13 oz, 63.412 kg)    Height Measured :   62.5 in(Converted to: 5 ft 2 in, 158.75 cm)    Body Mass Index :   25.16 kg/m2 (HI)    Body Surface Area :   1.67 m2   Systolic Blood Pressure :   132 mmHg (HI)    Diastolic Blood Pressure :   74 mmHg   Mean Arterial Pressure :   93 mmHg   Peripheral Pulse Rate :   98 bpm   BP Site :   Right arm   Pulse Site :   Radial artery   BP Method :   Electronic   HR Method :   Electronic   Temperature Tympanic :   96.4 DegF(Converted to: 35.8 DegC)  (LOW)    Apryl Jovel MA - 11/1/2021 1:35 PM CDT   Health Status   Allergies Verified? :   Yes   Medication History Verified? :   Yes   Immunizations Current :   Other: Declined TDAP.   Medical History Verified? :   Yes   Pre-Visit Planning Status :   Completed   Tobacco Use? :   Former smoker   Apryl Jovel MA - 11/1/2021 1:35 PM CDT   Consents   Consent for Immunization Exchange :   Consent Granted   Consent for Immunizations to Providers :   Consent Granted   Apryl Jovel MA - 11/1/2021 1:35 PM CDT   Meds / Allergies   (As Of: 11/1/2021 1:40:26 PM CDT)   Allergies (Active)   doxycycline  Estimated Onset Date:   Unspecified ; Reactions:   Ill ; Created By:   Nena Edouard CMA; Reaction Status:   Active ; Category:   Drug ; Substance:   doxycycline ; Type:   Allergy ; Updated By:   Nena Edouard CMA; Reviewed Date:   10/27/2021 1:26 PM CDT      naproxen  Estimated Onset Date:   Unspecified ; Created By:   Yulisa Ray LPN; Reaction Status:   Active ; Category:   Drug ; Substance:   naproxen ; Type:   Allergy ; Updated By:   Yulisa Ray LPN; Reviewed Date:   10/27/2021 1:26 PM CDT      sulfa drug  Estimated Onset  Date:   Unspecified ; Reactions:   Hives ; Created By:   Yulisa Ray LPN; Reaction Status:   Active ; Category:   Drug ; Substance:   sulfa drug ; Type:   Allergy ; Updated By:   Yulisa Ray LPN; Reviewed Date:   10/27/2021 1:26 PM CDT        Medication List   (As Of: 11/1/2021 1:40:26 PM CDT)   Prescription/Discharge Order    albuterol  :   albuterol ; Status:   Prescribed ; Ordered As Mnemonic:   Albuterol (Eqv-ProAir HFA) 90 mcg/inh inhalation aerosol ; Simple Display Line:   2 puff(s), Inhale, q6 hrs, 6.7 gm, 0 Refill(s) ; Ordering Provider:   Jurgen Potts PA-C; Catalog Code:   albuterol ; Order Dt/Tm:   10/11/2021 4:46:00 PM CDT          fluticasone  :   fluticasone ; Status:   Prescribed ; Ordered As Mnemonic:   Flovent  mcg/inh inhalation aerosol ; Simple Display Line:   220 mcg, Inhale, bid, 1 EA, 0 Refill(s) ; Ordering Provider:   Justina Laws MD; Catalog Code:   fluticasone ; Order Dt/Tm:   10/27/2021 1:33:36 PM CDT          levothyroxine  :   levothyroxine ; Status:   Prescribed ; Ordered As Mnemonic:   levothyroxine 88 mcg (0.088 mg) oral tablet ; Simple Display Line:   1 tab(s), Oral, daily, INCREASE., 90 tab(s), 2 Refill(s) ; Ordering Provider:   Justina Laws MD; Catalog Code:   levothyroxine ; Order Dt/Tm:   12/23/2020 4:32:41 PM [facility]            Home Meds    calcium-vitamin D  :   calcium-vitamin D ; Status:   Documented ; Ordered As Mnemonic:   Calcium 600+D ; Simple Display Line:   2 tab(s), po, daily ; Catalog Code:   calcium-vitamin D ; Order Dt/Tm:   8/4/2014 9:31:21 AM CDT          docusate  :   docusate ; Status:   Documented ; Ordered As Mnemonic:   Colace 50 mg oral capsule ; Simple Display Line:   50 mg, 1 cap(s), Oral, bid, 0 Refill(s) ; Catalog Code:   docusate ; Order Dt/Tm:   6/23/2021 1:39:09 PM CDT            Social History   Social History   (As Of: 11/1/2021 1:40:26 PM CDT)   Alcohol:        1 x per month, 1 drinks/episode average.  2 drinks/episode maximum.    (Last Updated: 3/13/2017 3:39:56 PM CDT by Yulisa Ray LPN)          Tobacco:  Past      Former smoker, quit more than 30 days ago   (Last Updated: 12/29/2020 10:42:34 AM CST by Dary Pro CMA)          Electronic Cigarette/Vaping:  Denies Electronic Cigarette Use      Electronic Cigarette Use: Never.   (Last Updated: 12/29/2020 10:43:04 AM CST by Dary Pro CMA)          Home/Environment:        Marital status:  x 2.   (Last Updated: 9/18/2014 9:11:25 AM CDT by Rama Pemberton)          Nutrition/Health:        Type of diet: Regular.   (Last Updated: 10/17/2013 11:40:58 AM CDT by Yulisa Ray LPN)          Exercise:  Regular exercise      (Last Updated: 10/17/2013 11:40:49 AM CDT by Yulisa Ray LPN )

## 2022-02-16 NOTE — PROGRESS NOTES
Patient:   MARLEN ARDON            MRN: [id number]            FIN: [id number]               Age:   50 years     Sex:  Female     :  1970   Associated Diagnoses:   Abdominal pain, lower   Author:   Jsutina Laws MD      Chief Complaint   2020 10:36 AM CST  Pt c/o stomach pain lower ab on and off since . She vomited soup last night      History of Present Illness   patient with suprapubic discomfort that was sharp starting 5 days ago  has noted pain waxes and wanes but has been ongoing, overall much milder  did improve after BM  no diarrhea  did vomit once yesterday after eating soup but then symptoms seemed better  no fever  has noticed a headache and feels more fatigued  has had some episodes of nausea but only the one episode of vomiting  no respiratory symptom, no chest pain, no change to smell or cough  no myalgias  notes that she has been working very long hours an feels like she wore herself out  no dysuria, no hematuria, no flank pain    previous surgery: hysterectomy and unilateral oophorectomy, as well as exploratory laparotomy for chronic pelvic pain before hysterectomy      Health Status   Allergies:    Allergic Reactions (All)  Severity Not Documented  Doxycycline (Ill)  Naproxen (No reactions were documented)  Sulfa drug (Hives)   Medications:  (Selected)   Prescriptions  Prescribed  Lexapro 10 mg oral tablet: = 1 tab(s) ( 10 mg ), PO, Daily, # 90 tab(s), 1 Refill(s), Type: Maintenance, Pharmacy: [facility], 1 tab(s) Oral daily, 62.5, in, 10/07/20 11:31:00 CDT, Height Measured, 149, lb, 10/07/20 11:31:00 CDT, Weight Measured  Magic mouthwash: Magic mouthwash, 10 ml QID PRN, Oral, qid, Instructions: 1 Part viscous lidocaine 2%;  1 Part Maalox;  1 Part diphenhydramine 12.5 mg per 5 ml elixir; 120 ml total, Supply, # 120 mL, 0 Refill(s), Type: Maintenance, Pharmacy: [facility], 10 ml QID...  levothyroxine 88 mcg (0.088 mg) oral tablet: = 1 tab(s), Oral, daily,  Instructions: INCREASE., # 90 tab(s), 2 Refill(s), Type: Maintenance, Pharmacy: [facility], TAKE 1 TABLET BY MOUTH ONCE DAILY ( INCREASE ), 62.5, in, 10/07/20 11:31:00 CDT, Height Measured, 149, lb, 10/07/20 11:31:00 CDT,...  Documented Medications  Documented  Calcium 600+D: 2 tab(s), po, daily, 0 Refill(s), Type: Maintenance  Dietary SUpplement-Acemannan: Dietary SUpplement-Acemannan, 0 Refill(s), Type: Maintenance   Problem list:    All Problems  Ovarian cyst / SNOMED CT [id number] / Confirmed  Right ovary  Osteopenia / SNOMED CT 740708035 / Confirmed  Chronic insomnia / SNOMED CT 434603718 / Confirmed  Anxiety Disorder / SNOMED CT [id number] / Confirmed  Acquired hypothyroidism / SNOMED CT 541722239 / Confirmed      Histories   Past Medical History:    Active  Ovarian cyst (SNOMED CT [id number])  Comments:  10/17/2013 CDT 11:40 AM DANIELT - Yulisa Ray LPN  Right ovary   Family History:    MI  Father  Diabetes mellitus  Mother  Hypertension  Father     Procedure history:    Hysterectomy and unilateral salpingo-oophorectomy sample ([id number]) on 6/2/2005 at 34 Years.  Comments:  10/17/2013 11:39 AM CDT - Yulisa Ray LPN  L ov elisabet removed.      Physical Examination   Vital Signs   12/29/2020 10:36 AM CST Temperature Tympanic 97.8 DegF  LOW    Peripheral Pulse Rate 87 bpm    Systolic Blood Pressure 100 mmHg    Diastolic Blood Pressure 74 mmHg    Mean Arterial Pressure 83 mmHg    BP Site Right arm    BP Method Manual    Oxygen Saturation 98 %      Measurements from flowsheet : Measurements   12/29/2020 10:36 AM CST Height Measured - Standard 62.5 in    Weight Measured - Standard 144 lb    BSA 1.7 m2    Body Mass Index 25.92 kg/m2  HI      General:  Alert and oriented, No acute distress.    Eye:  Pupils are equal, round and reactive to light, Normal conjunctiva.    HENT:  Normocephalic.    Neck:  Supple, Non-tender.    Respiratory:   Lungs are clear to auscultation, Respirations are non-labored.    Cardiovascular:  Normal rate, Regular rhythm.    Gastrointestinal:  Soft, Non-distended, Normal bowel sounds, minimally tender in suprapubic area.       Review / Management   Results review:  Lab results   9/28/2020 5:27 PM CDT T4 Free 1.4 ng/dL    TSH 0.99 mIU/L   9/28/2020 5:25 PM CDT Urine Culture See comment   9/28/2020 5:22 PM CDT UA pH 7.0    UA Specific Gravity 1.025    UA Glucose NEGATIVE    UA Bilirubin NEGATIVE    UA Ketones NEGATIVE    U Occult Blood NEGATIVE    UA Protein NEGATIVE    UA Nitrite NEGATIVE    UA Leuk Est NEGATIVE   9/28/2020 5:16 PM CDT WBC 6.0    RBC 4.83    Hgb 13.9 g/dL    Hct 41.7 %    MCV 86 fL    MCH 28.8 pg    MCHC 33.3 g/dL    RDW 14.1 %    Platelet 264    MPV 9.3 fL    Lymphs 27.7 %    Abs Lymphs 1.7    Neutrophils 60.2 %    Abs Neutrophils 3.6    Monocytes 9.6 %    Abs Monocytes 0.6    Eosinophils 2.2 %    Abs Eosinophils 0.1    Basophils 0.3 %    Abs Basophils 0.0   .       Impression and Plan   Diagnosis     Abdominal pain, lower (OUC15-FR R10.30).     Plan:  no evidence of acute abdomen, not suspicious for infection. patient has not been eating and drinking, not sleeping well, very anxious. Will try resting for next 2 days. Push fluids. Small frequent meals with mild foods. Expect will slowly improve over the next 24-48 hours. If fever, vomiting, or worsening pain, follow up for further evaluation.

## 2022-02-16 NOTE — NURSING NOTE
Comprehensive Intake Entered On:  10/27/2021 1:19 PM CDT    Performed On:  10/27/2021 1:14 PM CDT by Cathy Bruno               Summary   Chief Complaint :   c/o SOB, cough, fatigue- had covid x1 month ago and ever since then she has had SOB.   Menstrual Status :   Hysterectomy   Weight Measured :   140.7 lb(Converted to: 140 lb 11 oz, 63.820 kg)    Height Measured :   62.5 in(Converted to: 5 ft 2 in, 158.75 cm)    Body Mass Index :   25.32 kg/m2 (HI)    Body Surface Area :   1.68 m2   Systolic Blood Pressure :   100 mmHg   Diastolic Blood Pressure :   62 mmHg   Mean Arterial Pressure :   75 mmHg   Peripheral Pulse Rate :   94 bpm   BP Site :   Right arm   BP Method :   Manual   HR Method :   Electronic   Temperature Tympanic :   97.1 DegF(Converted to: 36.2 DegC)  (LOW)    Oxygen Saturation :   99 %   Cathy Bruno - 10/27/2021 1:14 PM CDT   Health Status   Allergies Verified? :   Yes   Medication History Verified? :   Yes   Immunizations Current :   Other: Declined TDAP.   Medical History Verified? :   Yes   Pre-Visit Planning Status :   Completed   Tobacco Use? :   Former smoker   Cathy Bruno - 10/27/2021 1:14 PM CDT   Consents   Consent for Immunization Exchange :   Consent Granted   Consent for Immunizations to Providers :   Consent Granted   Cathy Bruno - 10/27/2021 1:14 PM CDT   Meds / Allergies   (As Of: 10/27/2021 1:19:13 PM CDT)   Allergies (Active)   doxycycline  Estimated Onset Date:   Unspecified ; Reactions:   Ill ; Created By:   Nena Edouard CMA; Reaction Status:   Active ; Category:   Drug ; Substance:   doxycycline ; Type:   Allergy ; Updated By:   Nena Edouard CMA; Reviewed Date:   10/27/2021 1:17 PM CDT      naproxen  Estimated Onset Date:   Unspecified ; Created By:   Yulisa Ray LPN; Reaction Status:   Active ; Category:   Drug ; Substance:   naproxen ; Type:   Allergy ; Updated By:   Yulisa Ray LPN; Reviewed Date:   10/27/2021 1:17 PM CDT      sulfa drug   Estimated Onset Date:   Unspecified ; Reactions:   Hives ; Created By:   Yulisa Ray LPN; Reaction Status:   Active ; Category:   Drug ; Substance:   sulfa drug ; Type:   Allergy ; Updated By:   Yulisa Ray LPN; Reviewed Date:   10/27/2021 1:17 PM CDT        Medication List   (As Of: 10/27/2021 1:19:13 PM CDT)   Prescription/Discharge Order    albuterol  :   albuterol ; Status:   Prescribed ; Ordered As Mnemonic:   Albuterol (Eqv-ProAir HFA) 90 mcg/inh inhalation aerosol ; Simple Display Line:   2 puff(s), Inhale, q6 hrs, 6.7 gm, 0 Refill(s) ; Ordering Provider:   Jurgen Potts PA-C; Catalog Code:   albuterol ; Order Dt/Tm:   10/11/2021 4:46:00 PM CDT          levothyroxine  :   levothyroxine ; Status:   Prescribed ; Ordered As Mnemonic:   levothyroxine 88 mcg (0.088 mg) oral tablet ; Simple Display Line:   1 tab(s), Oral, daily, INCREASE., 90 tab(s), 2 Refill(s) ; Ordering Provider:   Justina Laws MD; Catalog Code:   levothyroxine ; Order Dt/Tm:   12/23/2020 4:32:41 PM [facility]            Home Meds    calcium-vitamin D  :   calcium-vitamin D ; Status:   Documented ; Ordered As Mnemonic:   Calcium 600+D ; Simple Display Line:   2 tab(s), po, daily ; Catalog Code:   calcium-vitamin D ; Order Dt/Tm:   8/4/2014 9:31:21 AM CDT          docusate  :   docusate ; Status:   Documented ; Ordered As Mnemonic:   Colace 50 mg oral capsule ; Simple Display Line:   50 mg, 1 cap(s), Oral, bid, 0 Refill(s) ; Catalog Code:   docusate ; Order Dt/Tm:   6/23/2021 1:39:09 PM CDT

## 2022-03-01 ENCOUNTER — TELEPHONE (OUTPATIENT)
Dept: FAMILY MEDICINE | Facility: CLINIC | Age: 52
End: 2022-03-01
Payer: COMMERCIAL

## 2022-03-01 DIAGNOSIS — E03.4 ACQUIRED ATROPHY OF THYROID: Primary | ICD-10-CM

## 2022-03-01 DIAGNOSIS — J45.30 MILD PERSISTENT ASTHMA WITHOUT COMPLICATION: ICD-10-CM

## 2022-03-01 DIAGNOSIS — K59.01 SLOW TRANSIT CONSTIPATION: ICD-10-CM

## 2022-03-01 PROBLEM — G47.00 INSOMNIA: Status: ACTIVE | Noted: 2022-03-01

## 2022-03-01 PROBLEM — F41.9 ANXIETY DISORDER: Status: ACTIVE | Noted: 2022-03-01

## 2022-03-01 PROBLEM — N83.209 CYST OF OVARY: Status: ACTIVE | Noted: 2022-03-01

## 2022-03-01 PROBLEM — M85.80 OSTEOPENIA: Status: ACTIVE | Noted: 2022-03-01

## 2022-03-01 RX ORDER — ALBUTEROL SULFATE 90 UG/1
2 AEROSOL, METERED RESPIRATORY (INHALATION) EVERY 4 HOURS PRN
COMMUNITY
Start: 2021-10-11 | End: 2022-03-01

## 2022-03-01 RX ORDER — DEXAMETHASONE 4 MG/1
2 TABLET ORAL 2 TIMES DAILY
Qty: 12 G | Refills: 8 | Status: SHIPPED | OUTPATIENT
Start: 2022-03-01 | End: 2022-05-04

## 2022-03-01 RX ORDER — LEVOTHYROXINE SODIUM 88 UG/1
88 TABLET ORAL DAILY
COMMUNITY
Start: 2020-12-23 | End: 2022-03-01

## 2022-03-01 RX ORDER — ALBUTEROL SULFATE 90 UG/1
2 AEROSOL, METERED RESPIRATORY (INHALATION) EVERY 4 HOURS PRN
Qty: 18 G | Refills: 11 | Status: SHIPPED | OUTPATIENT
Start: 2022-03-01 | End: 2022-05-04

## 2022-03-01 RX ORDER — DEXAMETHASONE 4 MG/1
2 TABLET ORAL 2 TIMES DAILY
COMMUNITY
Start: 2021-10-27 | End: 2022-03-01

## 2022-03-01 RX ORDER — LEVOTHYROXINE SODIUM 88 UG/1
88 TABLET ORAL DAILY
Qty: 90 TABLET | Refills: 2 | Status: SHIPPED | OUTPATIENT
Start: 2022-03-01 | End: 2024-05-22

## 2022-03-01 NOTE — TELEPHONE ENCOUNTER
Reason for Call:  Patient has changed jobs and will not have ins [facility] does not have any open appts for patient to be seen this week Patient is wondering if there is anyway she could get some refills to get her by until her new insurance starts.    Detailed comments: See Above    Phone Number Patient can be reached at: Cell number on file:    Telephone Information:   Mobile [phone]       Best Time: any     Can we leave a detailed message on this number? NO    Call taken on 3/1/2022 at 8:16 AM by Nova Eisenberg

## 2022-03-01 NOTE — TELEPHONE ENCOUNTER
Talked with pt at [phone] - she works 7-4 tomorrow and Thursday.  Wondering if she could just get a TSH done or refill for 90 days until she gets her new insurance figured out.  Please advise

## 2022-03-02 VITALS — HEIGHT: 63 IN | BODY MASS INDEX: 25.92 KG/M2

## 2022-03-02 NOTE — TELEPHONE ENCOUNTER
TSH is up to date - last done 11/2021.  I will refill medications for 9 months.  If she thinks it will be longer than that without insurance, OK to come in for TSH

## 2022-03-02 NOTE — NURSING NOTE
Comprehensive Intake Entered On:  1/18/2022 9:29 AM CST    Performed On:  1/18/2022 9:24 AM CST by Geeta Dunaway LPN               Summary   Chief Complaint :   Negative covid test yesterday [facility]. feels that lungs are spasms, sore throat. verbal consent given for video visit     Menstrual Status :   Hysterectomy   Height Measured :   62.5 in(Converted to: 5 ft 2 in, 158.75 cm)    Geeta Dunaway LPN - 1/18/2022 9:24 AM CST   Health Status   Allergies Verified? :   Yes   Medication History Verified? :   Yes   Immunizations Current :   Other: Declined TDAP.   Pre-Visit Planning Status :   Completed   Geeta Dunaway LPN - 1/18/2022 9:24 AM CST   Consents   Consent for Immunization Exchange :   Consent Granted   Consent for Immunizations to Providers :   Consent Granted   Geeta Dunaway LPN - 1/18/2022 9:24 AM CST   Meds / Allergies   (As Of: 1/18/2022 9:29:46 AM CST)   Allergies (Active)   doxycycline  Estimated Onset Date:   Unspecified ; Reactions:   Ill ; Created By:   Nena Edouard CMA; Reaction Status:   Active ; Category:   Drug ; Substance:   doxycycline ; Type:   Allergy ; Updated By:   Nena Edouard CMA; Reviewed Date:   12/17/2021 1:21 PM CST      naproxen  Estimated Onset Date:   Unspecified ; Created By:   Yulisa Ray LPN; Reaction Status:   Active ; Category:   Drug ; Substance:   naproxen ; Type:   Allergy ; Updated By:   Yulisa Ray LPN; Reviewed Date:   12/17/2021 1:21 PM CST      sulfa drug  Estimated Onset Date:   Unspecified ; Reactions:   Hives ; Created By:   Yulisa Ray LPN; Reaction Status:   Active ; Category:   Drug ; Substance:   sulfa drug ; Type:   Allergy ; Updated By:   Yulisa Ray LPN; Reviewed Date:   12/17/2021 1:21 PM CST        Medication List   (As Of: 1/18/2022 9:29:46 AM CST)   Prescription/Discharge Order    albuterol  :   albuterol ; Status:   Prescribed ; Ordered As Mnemonic:   Albuterol (Eqv-ProAir HFA) 90 mcg/inh inhalation aerosol ; Simple  Display Line:   2 puff(s), Inhale, q6 hrs, 6.7 gm, 0 Refill(s) ; Ordering Provider:   Jurgen Potts PA-C; Catalog Code:   albuterol ; Order Dt/Tm:   10/11/2021 4:46:00 PM CDT          fluticasone  :   fluticasone ; Status:   Prescribed ; Ordered As Mnemonic:   Flovent  mcg/inh inhalation aerosol ; Simple Display Line:   220 mcg, Inhale, bid, 1 EA, 0 Refill(s) ; Ordering Provider:   Justina Laws MD; Catalog Code:   fluticasone ; Order Dt/Tm:   10/27/2021 1:33:36 PM CDT          levothyroxine  :   levothyroxine ; Status:   Prescribed ; Ordered As Mnemonic:   levothyroxine 88 mcg (0.088 mg) oral tablet ; Simple Display Line:   1 tab(s), Oral, daily, INCREASE., 90 tab(s), 2 Refill(s) ; Ordering Provider:   Justina Laws MD; Catalog Code:   levothyroxine ; Order Dt/Tm:   12/23/2020 4:32:41 PM CST          predniSONE  :   predniSONE ; Status:   Completed ; Ordered As Mnemonic:   predniSONE 10 mg oral tablet ; Simple Display Line:   as directed, PO, Daily, 4 tablets for 4 days then 3 tablets for 4 days then 2 tablets for 4 days then 1 tablet for 4 days, 40 tab(s), 0 Refill(s) ; Ordering Provider:   Justina Laws MD; Catalog Code:   predniSONE ; Order Dt/Tm:   12/6/2021 11:25:00 AM CST            Home Meds    calcium-vitamin D  :   calcium-vitamin D ; Status:   Documented ; Ordered As Mnemonic:   Calcium 600+D ; Simple Display Line:   2 tab(s), po, daily ; Catalog Code:   calcium-vitamin D ; Order Dt/Tm:   8/4/2014 9:31:21 AM CDT          docusate  :   docusate ; Status:   Documented ; Ordered As Mnemonic:   Colace 50 mg oral capsule ; Simple Display Line:   50 mg, 1 cap(s), Oral, bid, 0 Refill(s) ; Catalog Code:   docusate ; Order Dt/Tm:   6/23/2021 1:39:09 PM CDT            Social History   Social History   (As Of: 1/18/2022 9:29:46 AM CST)   Alcohol:        1 x per month, 1 drinks/episode average.  2 drinks/episode maximum.   (Last Updated: 3/13/2017 3:39:56 PM CDT by Yulisa Ray LPN)           Tobacco:  Past      Former smoker, quit more than 30 days ago   (Last Updated: 1/18/2022 9:27:40 AM CST by Geeta Dunaway LPN)          Electronic Cigarette/Vaping:  Denies Electronic Cigarette Use      Electronic Cigarette Use: Never.   (Last Updated: 1/18/2022 9:27:50 AM CST by Geeta Dunaway LPN)          Home/Environment:        Marital status:  x 2.   (Last Updated: 9/18/2014 9:11:25 AM CDT by Rama Pemberton)          Nutrition/Health:        Type of diet: Regular.   (Last Updated: 10/17/2013 11:40:58 AM CDT by Yulisa Ray LPN)          Exercise:  Regular exercise      (Last Updated: 10/17/2013 11:40:49 AM CDT by Yulisa Ray LPN )

## 2022-03-02 NOTE — PROGRESS NOTES
"   Patient:   MARLEN ARDON            MRN: [id number]            FIN: [id number]               Age:   51 years     Sex:  Female     :  1970   Associated Diagnoses:   URI (upper respiratory infection)   Author:   Jurgen Potts PA-C      Report Summary   Diagnosis  URI (upper respiratory infection) ([id number] J06.9).  Patient InstructionsSummary   Visit Information      Date of Service: 2022 06:31 am  Performing Location: [facility]  Encounter#: [id number]      Primary Care Provider (PCP):  Justina Laws MD    NPI# [id number]      Referring Provider:  Jurgen Potts PA-C    NPI# [id number]   Visit type:  Video Visit via [email or web address] or [email or web address].    Participants in room during visit:  2   Location of patient:  Home  Location of provider:  _ (Clinic office )  Video Start Time:  940  Video End Time:   945    Today's visit was conducted via video conference due to the COVID-19 pandemic.  The patient's consent to proceed with a video visit has been obtained and documented.      Chief Complaint   URI symptoms.       History of Present Illness   Patient is a 51 year old F who is being evaluated via a billable video visit. Tested negative for Covid. Tested positive for \"some resp. virus\"  Cough. No fever. Mild chest tightness. Runny nose and diarrhea. Needs note for work      Review of Systems   Constitutional:  Negative.    Eye:  Negative.    Ear/Nose/Mouth/Throat:  Negative except as documented in history of present illness.    Respiratory:  Negative except as documented in history of present illness.    Cardiovascular:  Negative.    Gastrointestinal:  Negative except as documented in history of present illness.    Genitourinary:  Negative.    Immunologic:  Negative.    Musculoskeletal:  Negative.    Integumentary:  Negative.    Neurologic:  Negative.    Psychiatric:  Negative.       Health Status   Allergies:    Allergic Reactions (Selected)  Severity Not Documented  Doxycycline " (Ill)  Naproxen (No reactions were documented)  Sulfa drug (Hives)   Medications:  (Selected)   Prescriptions  Prescribed  Albuterol (Eqv-ProAir HFA) 90 mcg/inh inhalation aerosol: 2 puff(s), Inhale, q6 hrs, # 6.7 gm, 0 Refill(s), Type: Maintenance, Pharmacy: [facility], 2 puff(s) Inhale q6 hrs, 62.5, in, 10/11/21 16:26:00 CDT, Height Measured, 139.5, lb, 10/11/21 16:26:00 CDT, Weight Measured  Flovent  mcg/inh inhalation aerosol: ( 220 mcg ), Inhale, bid, # 1 EA, 0 Refill(s), Type: Maintenance, Pharmacy: [facility], 220 mcg Inhale bid, 62.5, in, 10/27/21 13:14:00 CDT, Height Measured, 140.7, lb, 10/27/21 13:14:00 CDT, Weight Measured  levothyroxine 88 mcg (0.088 mg) oral tablet: = 1 tab(s), Oral, daily, Instructions: INCREASE., # 90 tab(s), 2 Refill(s), Type: Maintenance, Pharmacy: [facility], TAKE 1 TABLET BY MOUTH ONCE DAILY ( INCREASE ), 62.5, in, 10/07/20 11:31:00 CDT, Height Measured, 149, lb, 10/07/20 11:31:00 CDT,...  Documented Medications  Documented  Calcium 600+D: 2 tab(s), po, daily, 0 Refill(s), Type: Maintenance  Colace 50 mg oral capsule: = 1 cap(s) ( 50 mg ), Oral, bid, 0 Refill(s), Type: Maintenance   Problem list:    All Problems  Unspecified abdominal pain / SNOMED CT 24625676 / Confirmed  Ovarian cyst / SNOMED CT 225444801 / Confirmed  Osteopenia / SNOMED CT 906877090 / Confirmed  Chronic insomnia / SNOMED CT 347981937 / Confirmed  Anxiety disorder / SNOMED CT 062436646 / Confirmed  Acquired hypothyroidism / SNOMED CT 241998338 / Confirmed      Histories   Past Medical History:    Active  Ovarian cyst (200829019)  Comments:  10/17/2013 CDT 11:40 AM CDT - Sherlyn Ray LPNn  Right ovary  Osteopenia (866469237)  Chronic insomnia (332671704)  Anxiety disorder (745178385)  Acquired hypothyroidism (799192964)  Unspecified abdominal pain (77431070)   Family History:    MI  Father  Diabetes mellitus  Mother  Hypertension  Father     Procedure history:    Colonoscopy (080850616) on  3/26/2021 at 50 Years.  Comments:  4/15/2021 7:59 AM CDT - Rama Pemberton  Indication: Left lower quadrant abdominal pain.   Sedation: Versed, Fentanyl.  Findings: Normal with the exception of severe diverticulosis in descending and sigmoid colon.  Hysterectomy and unilateral salpingo-oophorectomy sample (833987434) on 6/2/2005 at 34 Years.  Comments:  10/17/2013 11:39 AM CDT - Yulisa Ray LPN ov elisabet removed.   Social History:        Electronic Cigarette/Vaping Assessment: Denies Electronic Cigarette Use            Electronic Cigarette Use: Never.      Alcohol Assessment            1 x per month, 1 drinks/episode average.  2 drinks/episode maximum.      Tobacco Assessment: Past            Former smoker, quit more than 30 days ago      Home and Environment Assessment            Marital status:  x 2.      Nutrition and Health Assessment            Type of diet: Regular.      Exercise and Physical Activity Assessment: Regular exercise        Physical Examination   General:  Alert and oriented, No acute distress.    Eye:  Pupils are equal, round and reactive to light, Normal conjunctiva.    HENT:  Oral mucosa is moist.    Neck:  Supple.    Respiratory:  Respirations are non-labored.    Psychiatric:  Cooperative, Appropriate mood & affect, Normal judgment.       Impression and Plan   Diagnosis     URI (upper respiratory infection) (EHE30-BY J06.9).     Patient Instructions:       Counseled: Patient, Regarding treatment, Regarding medications, Verbalized understanding.    Summary:  Push fluids. If no fever, may use Imodium. FU as we discussed..       Health Maintenance      Recommendations     Pending (in the next year)        OverDue           Breast Cancer Screen due  03/23/19  and every 2  year(s)           Alcohol Misuse Screen due  10/07/21  and every 1  year(s)           Depression Screen due  10/07/21  and every 1  year(s)        Due            Aspirin Therapy for Prevention of CVD and CRC due   01/18/22  and every 5  year(s)           COVID-19 Vaccine Not Started due  01/18/22  One-time only        Refused            Influenza Vaccine due  09/01/21  and every 1  year(s)        Due In Future            Body Mass Index Check not due until  12/17/22  and every 1  year(s)           High Blood Pressure Screen not due until  12/17/22  and every 1  year(s)           Tobacco Use Screen not due until  12/17/22  and every 1  year(s)     Satisfied (in the past 1 year)        Satisfied            Body Mass Index Check on  12/17/21.           Body Mass Index Check on  12/06/21.           Body Mass Index Check on  11/24/21.           Body Mass Index Check on  11/23/21.           Body Mass Index Check on  11/19/21.           Body Mass Index Check on  11/01/21.           Body Mass Index Check on  10/27/21.           Body Mass Index Check on  10/11/21.           Body Mass Index Check on  08/12/21.           Body Mass Index Check on  07/13/21.           Body Mass Index Check on  06/23/21.           Body Mass Index Check on  03/02/21.           Body Mass Index Check on  02/03/21.           Body Mass Index Check on  01/21/21.           Colorectal Cancer Screen (Colonoscopy) on  05/19/21.           Colorectal Cancer Screen (Occult Blood) on  05/19/21.           Colorectal Cancer Screen (Sigmoidoscopy) on  05/19/21.           Colorectal Cancer Screen (Colonoscopy) on  03/26/21.           Colorectal Cancer Screen (Colonoscopy) on  03/26/21.           Colorectal Cancer Screen (Occult Blood) on  03/26/21.           Colorectal Cancer Screen (Sigmoidoscopy) on  03/26/21.           Colorectal Cancer Screen (Sigmoidoscopy) on  03/26/21.           High Blood Pressure Screen on  12/17/21.           High Blood Pressure Screen on  12/06/21.           High Blood Pressure Screen on  11/24/21.           High Blood Pressure Screen on  11/23/21.           High Blood Pressure Screen on  11/19/21.           High Blood Pressure Screen on   11/16/21.           High Blood Pressure Screen on  11/01/21.           High Blood Pressure Screen on  10/27/21.           High Blood Pressure Screen on  10/11/21.           High Blood Pressure Screen on  09/09/21.           High Blood Pressure Screen on  08/12/21.           High Blood Pressure Screen on  07/13/21.           High Blood Pressure Screen on  06/23/21.           High Blood Pressure Screen on  03/02/21.           High Blood Pressure Screen on  02/03/21.           High Blood Pressure Screen on  01/21/21.           Tobacco Use Screen on  12/17/21.           Tobacco Use Screen on  11/24/21.           Tobacco Use Screen on  11/23/21.           Tobacco Use Screen on  11/01/21.           Tobacco Use Screen on  10/27/21.           Tobacco Use Screen on  10/11/21.           Tobacco Use Screen on  09/29/21.           Tobacco Use Screen on  09/09/21.           Tobacco Use Screen on  08/12/21.           Tobacco Use Screen on  06/23/21.           Tobacco Use Screen on  03/02/21.           Tobacco Use Screen on  02/03/21.           Tobacco Use Screen on  01/21/21.

## 2022-03-02 NOTE — TELEPHONE ENCOUNTER
Per pt from yesterday okay to LVM - left detailed message with pt's message.  Asked her to c/b with any concerns/questions

## 2022-03-02 NOTE — PROGRESS NOTES
Patient:   MARLEN ARDON            MRN: [id number]            FIN: [id number]               Age:   51 years     Sex:  Female     :  1970   Associated Diagnoses:   None   Author:   Jurgen Potts PA-C       -   Today's date:  2022 9:41:00 AM .        -   To whom it may concern:        This patient is currently under my care and Was seen in my office on  2022 9:41:00 AM.  .     Please excuse him/ her from work, for the next  3  days.  He/ she may return to work, on  2022, without restrictions.

## 2022-05-04 ENCOUNTER — OFFICE VISIT (OUTPATIENT)
Dept: FAMILY MEDICINE | Facility: CLINIC | Age: 52
End: 2022-05-04
Payer: COMMERCIAL

## 2022-05-04 VITALS
WEIGHT: 143 LBS | HEART RATE: 61 BPM | BODY MASS INDEX: 25.34 KG/M2 | DIASTOLIC BLOOD PRESSURE: 80 MMHG | HEIGHT: 63 IN | SYSTOLIC BLOOD PRESSURE: 117 MMHG

## 2022-05-04 DIAGNOSIS — F41.1 GENERALIZED ANXIETY DISORDER: Primary | ICD-10-CM

## 2022-05-04 DIAGNOSIS — E03.4 ACQUIRED ATROPHY OF THYROID: ICD-10-CM

## 2022-05-04 DIAGNOSIS — M25.50 MULTIPLE JOINT PAIN: ICD-10-CM

## 2022-05-04 PROBLEM — N83.209 CYST OF OVARY: Status: RESOLVED | Noted: 2022-03-01 | Resolved: 2022-05-04

## 2022-05-04 PROBLEM — Z87.19 HISTORY OF DIVERTICULITIS OF COLON: Status: ACTIVE | Noted: 2021-03-25

## 2022-05-04 LAB
ERYTHROCYTE [DISTWIDTH] IN BLOOD BY AUTOMATED COUNT: 13.3 % (ref 10–15)
ERYTHROCYTE [SEDIMENTATION RATE] IN BLOOD BY WESTERGREN METHOD: 19 MM/HR (ref 0–30)
HCT VFR BLD AUTO: 43.4 % (ref 35–47)
HGB BLD-MCNC: 13.9 G/DL (ref 11.7–15.7)
MCH RBC QN AUTO: 27.5 PG (ref 26.5–33)
MCHC RBC AUTO-ENTMCNC: 32 G/DL (ref 31.5–36.5)
MCV RBC AUTO: 86 FL (ref 78–100)
PLATELET # BLD AUTO: 293 10E3/UL (ref 150–450)
RBC # BLD AUTO: 5.05 10E6/UL (ref 3.8–5.2)
TSH SERPL DL<=0.005 MIU/L-ACNC: 0.33 MU/L (ref 0.4–4)
WBC # BLD AUTO: 6.1 10E3/UL (ref 4–11)

## 2022-05-04 PROCEDURE — 85652 RBC SED RATE AUTOMATED: CPT | Performed by: FAMILY MEDICINE

## 2022-05-04 PROCEDURE — 84443 ASSAY THYROID STIM HORMONE: CPT | Performed by: FAMILY MEDICINE

## 2022-05-04 PROCEDURE — 85027 COMPLETE CBC AUTOMATED: CPT | Performed by: FAMILY MEDICINE

## 2022-05-04 PROCEDURE — 36415 COLL VENOUS BLD VENIPUNCTURE: CPT | Performed by: FAMILY MEDICINE

## 2022-05-04 PROCEDURE — 99214 OFFICE O/P EST MOD 30 MIN: CPT | Performed by: FAMILY MEDICINE

## 2022-05-04 RX ORDER — ESCITALOPRAM OXALATE 10 MG/1
10 TABLET ORAL DAILY
Qty: 90 TABLET | Refills: 1 | Status: SHIPPED | OUTPATIENT
Start: 2022-05-04 | End: 2022-05-18

## 2022-05-04 ASSESSMENT — ANXIETY QUESTIONNAIRES
GAD7 TOTAL SCORE: 10
5. BEING SO RESTLESS THAT IT IS HARD TO SIT STILL: SEVERAL DAYS
GAD7 TOTAL SCORE: 10
GAD7 TOTAL SCORE: 10
7. FEELING AFRAID AS IF SOMETHING AWFUL MIGHT HAPPEN: SEVERAL DAYS
2. NOT BEING ABLE TO STOP OR CONTROL WORRYING: MORE THAN HALF THE DAYS
8. IF YOU CHECKED OFF ANY PROBLEMS, HOW DIFFICULT HAVE THESE MADE IT FOR YOU TO DO YOUR WORK, TAKE CARE OF THINGS AT HOME, OR GET ALONG WITH OTHER PEOPLE?: SOMEWHAT DIFFICULT
7. FEELING AFRAID AS IF SOMETHING AWFUL MIGHT HAPPEN: SEVERAL DAYS
6. BECOMING EASILY ANNOYED OR IRRITABLE: MORE THAN HALF THE DAYS
1. FEELING NERVOUS, ANXIOUS, OR ON EDGE: SEVERAL DAYS
4. TROUBLE RELAXING: SEVERAL DAYS
3. WORRYING TOO MUCH ABOUT DIFFERENT THINGS: MORE THAN HALF THE DAYS

## 2022-05-04 ASSESSMENT — PATIENT HEALTH QUESTIONNAIRE - PHQ9
10. IF YOU CHECKED OFF ANY PROBLEMS, HOW DIFFICULT HAVE THESE PROBLEMS MADE IT FOR YOU TO DO YOUR WORK, TAKE CARE OF THINGS AT HOME, OR GET ALONG WITH OTHER PEOPLE: SOMEWHAT DIFFICULT
SUM OF ALL RESPONSES TO PHQ QUESTIONS 1-9: 8
SUM OF ALL RESPONSES TO PHQ QUESTIONS 1-9: 8

## 2022-05-04 ASSESSMENT — ENCOUNTER SYMPTOMS: NERVOUS/ANXIOUS: 1

## 2022-05-04 NOTE — LETTER
May 5, 2022      Martina Villalobosrenetta  [address] [place] [phone]        Dear ,    We are writing to inform you of your test results.    Your test results fall within the expected range(s) or remain unchanged from previous results.  Please continue with current treatment plan.    Resulted Orders   TSH   Result Value Ref Range    TSH 0.33 (L) 0.40 - 4.00 mU/L   CBC with platelets   Result Value Ref Range    WBC Count 6.1 4.0 - 11.0 10e3/uL    RBC Count 5.05 3.80 - 5.20 10e6/uL    Hemoglobin 13.9 11.7 - 15.7 g/dL    Hematocrit 43.4 35.0 - 47.0 %    MCV 86 78 - 100 fL    MCH 27.5 26.5 - 33.0 pg    MCHC 32.0 31.5 - 36.5 g/dL    RDW 13.3 10.0 - 15.0 %    Platelet Count 293 150 - 450 10e3/uL   ESR: Erythrocyte sedimentation rate   Result Value Ref Range    Erythrocyte Sedimentation Rate 19 0 - 30 mm/hr       If you have any questions or concerns, please call the clinic at the number listed above.       Sincerely,      Justina Laws MD

## 2022-05-04 NOTE — PROGRESS NOTES
"  Assessment & Plan     Generalized anxiety disorder  Will resume treatment with escitalopram.  If not improving over the next 1 to 2 months recommend counseling.  - escitalopram (LEXAPRO) 10 MG tablet; Take 1 tablet (10 mg) by mouth daily    Multiple joint pain  Patient with increased generalized pain.  Will check labs as ordered.  May be related to increased anxiety with depression  - CBC with platelets; Future  - ESR: Erythrocyte sedimentation rate; Future  - CBC with platelets  - ESR: Erythrocyte sedimentation rate    Acquired atrophy of thyroid  Has noticed increased fatigue which also may be related to anxiety with depression.  However we will check TSH today to make sure levothyroxine is at appropriate dose  - TSH; Future  - TSH             BMI:   Estimated body mass index is 25.33 kg/m  as calculated from the following:    Height as of this encounter: 1.6 m (5' 3\").    Weight as of this encounter: 64.9 kg (143 lb).           No follow-ups on file.    Justina Laws MD  [facility]    Tteo Mack is a 51 year old who presents for the following health issues with her anxiety and depression. Is have issues with head shacking.         Worsening depression and anxiety. Patient with decreased interest in life. Has difficulty sleeping. Mother passed away April 4th. Notes some relationship difficulties with significant other. Feeling foggy.       Also discussed that she has increased head tremor. Happens only at rest. Not always aware of it. No other tremors noted.    Anxiety    History of Present Illness       Mental Health Follow-up:  Patient presents to follow-up on Depression & Anxiety.Patient's depression since last visit has been:  Medium  The patient is having other symptoms associated with depression.  Patient's anxiety since last visit has been:  Medium  The patient is having other symptoms associated with anxiety.  Any significant life events: relationship concerns, " "job concerns, financial concerns, housing concerns, grief or loss and health concerns  Patient is feeling anxious or having panic attacks.  Patient has no concerns about alcohol or drug use.       Today's PHQ-9         PHQ-9 Total Score: 8  PHQ-9 Q9 Thoughts of better off dead/self-harm past 2 weeks :   (P) Not at all    How difficult have these problems made it for you to do your work, take care of things at home, or get along with other people: Somewhat difficult    Today's HOANG-7 Score: 10    Hypothyroidism:     Since last visit, patient describes the following symptoms::  Anxiety, Constipation, Depression, Dry skin, Fatigue, Hair loss, Loose stools, Tremors and Weight gain    Weight gain::  Less than 5 lbs.    Reason for visit:  Depression, anxiety and head tremors  Symptom onset:  More than a month  Symptoms include:  Low self esteem, body pain, headaches, head tremors,  anxiety, lack of interest, lack of focus  Symptom intensity:  Moderate  Symptom progression:  Staying the same  Had these symptoms before:  Yes  Has tried/received treatment for these symptoms:  Yes  Previous treatment was successful:  Yes  Prior treatment description:  Medication  What makes it worse:  Just had a death in the family  What makes it better:  Relaxing in quiet, trying to  talk about what is bothering me    She eats 0-1 servings of fruits and vegetables daily.She consumes 1 sweetened beverage(s) daily.She exercises with enough effort to increase her heart rate 9 or less minutes per day.  She exercises with enough effort to increase her heart rate 3 or less days per week.   She is taking medications regularly.             Review of Systems   Psychiatric/Behavioral: The patient is nervous/anxious.             Objective    /80   Pulse 61   Ht 1.6 m (5' 3\")   Wt 64.9 kg (143 lb)   BMI 25.33 kg/m    Body mass index is 25.33 kg/m .  Physical Exam   GENERAL: healthy, alert and no distress  EYES: Eyes grossly normal to " inspection, PERRL and conjunctivae and sclerae normal  HENT: ear canals and TM's normal, nose and mouth without ulcers or lesions  NECK: no adenopathy, no asymmetry, masses, or scars and thyroid normal to palpation  RESP: lungs clear to auscultation - no rales, rhonchi or wheezes  CV: regular rate and rhythm, normal S1 S2, no S3 or S4, no murmur, click or rub, no peripheral edema and peripheral pulses strong  ABDOMEN: soft, nontender, no hepatosplenomegaly, no masses and bowel sounds normal  NEURO: Normal strength and tone, mentation intact and speech normal

## 2022-05-04 NOTE — LETTER
[facility]  [address] [facility] [id number] [phone]          May 4, 2022    RE:  Martina Woods                                                                                                                                                       [address] [place] [phone]            To whom it may concern:    Martina Woods is under my professional care and will need the rest of the week off (return 5/9/22).      Sincerely,        Justina Laws MD

## 2022-05-05 ENCOUNTER — TELEPHONE (OUTPATIENT)
Dept: FAMILY MEDICINE | Facility: CLINIC | Age: 52
End: 2022-05-05
Payer: COMMERCIAL

## 2022-05-05 ASSESSMENT — PATIENT HEALTH QUESTIONNAIRE - PHQ9: SUM OF ALL RESPONSES TO PHQ QUESTIONS 1-9: 8

## 2022-05-05 ASSESSMENT — ANXIETY QUESTIONNAIRES: GAD7 TOTAL SCORE: 10

## 2022-05-05 NOTE — TELEPHONE ENCOUNTER
No change to medication. This is close to normal and I don't want to decrease dose of levothyroxine as I think the dose would be too low.. Slightly low TSH means that the thyroid hormone level is borderline high.

## 2022-05-05 NOTE — TELEPHONE ENCOUNTER
Patient called for lab results from 5/4/22.  Results have been released.  Verbally shared results with patient.    Patient would like to know if any changes for thyroid medication:  TSH   Date Value Ref Range Status   05/04/2022 0.33 (L) 0.40 - 4.00 mU/L Final     Please advise:    HUNTER Mitchell  [facility]

## 2022-05-11 ENCOUNTER — OFFICE VISIT (OUTPATIENT)
Dept: FAMILY MEDICINE | Facility: CLINIC | Age: 52
End: 2022-05-11
Payer: COMMERCIAL

## 2022-05-11 VITALS
HEART RATE: 120 BPM | DIASTOLIC BLOOD PRESSURE: 77 MMHG | BODY MASS INDEX: 24.98 KG/M2 | OXYGEN SATURATION: 95 % | SYSTOLIC BLOOD PRESSURE: 115 MMHG | WEIGHT: 141 LBS | HEIGHT: 63 IN

## 2022-05-11 DIAGNOSIS — G44.209 TENSION HEADACHE: ICD-10-CM

## 2022-05-11 DIAGNOSIS — J02.9 ACUTE PHARYNGITIS, UNSPECIFIED ETIOLOGY: Primary | ICD-10-CM

## 2022-05-11 LAB
BASOPHILS # BLD AUTO: 0 10E3/UL (ref 0–0.2)
BASOPHILS NFR BLD AUTO: 0 %
DEPRECATED S PYO AG THROAT QL EIA: NEGATIVE
EOSINOPHIL # BLD AUTO: 0.1 10E3/UL (ref 0–0.7)
EOSINOPHIL NFR BLD AUTO: 1 %
ERYTHROCYTE [DISTWIDTH] IN BLOOD BY AUTOMATED COUNT: 13.2 % (ref 10–15)
ERYTHROCYTE [SEDIMENTATION RATE] IN BLOOD BY WESTERGREN METHOD: 16 MM/HR (ref 0–30)
GROUP A STREP BY PCR: NOT DETECTED
HCT VFR BLD AUTO: 43.1 % (ref 35–47)
HGB BLD-MCNC: 14 G/DL (ref 11.7–15.7)
IMM GRANULOCYTES # BLD: 0 10E3/UL
IMM GRANULOCYTES NFR BLD: 0 %
LYMPHOCYTES # BLD AUTO: 1.3 10E3/UL (ref 0.8–5.3)
LYMPHOCYTES NFR BLD AUTO: 19 %
MCH RBC QN AUTO: 27.7 PG (ref 26.5–33)
MCHC RBC AUTO-ENTMCNC: 32.5 G/DL (ref 31.5–36.5)
MCV RBC AUTO: 85 FL (ref 78–100)
MONOCYTES # BLD AUTO: 0.5 10E3/UL (ref 0–1.3)
MONOCYTES NFR BLD AUTO: 7 %
NEUTROPHILS # BLD AUTO: 5 10E3/UL (ref 1.6–8.3)
NEUTROPHILS NFR BLD AUTO: 73 %
PLATELET # BLD AUTO: 269 10E3/UL (ref 150–450)
RBC # BLD AUTO: 5.05 10E6/UL (ref 3.8–5.2)
WBC # BLD AUTO: 6.9 10E3/UL (ref 4–11)

## 2022-05-11 PROCEDURE — 85652 RBC SED RATE AUTOMATED: CPT | Performed by: FAMILY MEDICINE

## 2022-05-11 PROCEDURE — 36415 COLL VENOUS BLD VENIPUNCTURE: CPT | Performed by: FAMILY MEDICINE

## 2022-05-11 PROCEDURE — 87651 STREP A DNA AMP PROBE: CPT | Performed by: FAMILY MEDICINE

## 2022-05-11 PROCEDURE — 99213 OFFICE O/P EST LOW 20 MIN: CPT | Performed by: FAMILY MEDICINE

## 2022-05-11 PROCEDURE — 85025 COMPLETE CBC W/AUTO DIFF WBC: CPT | Mod: QW | Performed by: FAMILY MEDICINE

## 2022-05-11 RX ORDER — RIZATRIPTAN BENZOATE 5 MG/1
5 TABLET, ORALLY DISINTEGRATING ORAL
Qty: 9 TABLET | Refills: 1 | Status: SHIPPED | OUTPATIENT
Start: 2022-05-11 | End: 2024-05-22

## 2022-05-11 ASSESSMENT — ENCOUNTER SYMPTOMS
HEADACHES: 1
VOMITING: 1

## 2022-05-11 NOTE — PROGRESS NOTES
Assessment & Plan     Acute pharyngitis, unspecified etiology  Doubt infection.  May be related to recent vomiting.  Strep PCR is still pending.  Rapid strep is negative  - Streptococcus A Rapid Screen w/Reflex to PCR - Clinic Collect  - CBC with platelets and differential; Future  - ESR: Erythrocyte sedimentation rate; Future  - CBC with platelets and differential  - ESR: Erythrocyte sedimentation rate  - Group A Streptococcus PCR Throat Swab    Tension headache  Likely related to tension but does have a history of migraines.  We will try Maxalt as that was quite effective for her previously.  She is following again next week and we will reevaluate.  She cannot tolerate NSAIDs and so can continue Tylenol as needed.  - CBC with platelets and differential; Future  - ESR: Erythrocyte sedimentation rate; Future  - CBC with platelets and differential  - ESR: Erythrocyte sedimentation rate  - rizatriptan (MAXALT-MLT) 5 MG ODT; Take 1 tablet (5 mg) by mouth at onset of headache for migraine May repeat in 2 hours. Max 6 tablets/24 hours.                 Keep follow-up appointment for next week.  Off work until Monday.  Okay to return to work Monday and Tuesday prior to her visit on Wednesday  Justina Laws MD  [facility]    Teto Mack is a 51 year old who presents for the following health issues; migraine that started on Monday with nausea and vomiting.     Did start a new medication escitalopram on Friday 5/6/2022.    Started with headache on Sunday. Headache lasted for past 5 days. Vomiting for 24 hours on 5/9/22. No fevers. Pushing fluids. Never a headache like this recently.    Has a history of migraines but not usually lasting this long. Pain at base of head, temples, and behind eyes. Squeezing pain.  Has noticed a sore throat.  Has felt very fatigued.  No congestion no cough.  No ongoing nausea.  No diarrhea.  No chest pain or shortness of breath.  No abdominal  "pain.    Vomiting   Associated symptoms include headaches.   Headache   Associated symptoms include vomiting.              Review of Systems   Gastrointestinal: Positive for vomiting.   Neurological: Positive for headaches.            Objective    /77   Pulse 120   Ht 1.588 m (5' 2.5\")   Wt 64 kg (141 lb)   SpO2 95%   BMI 25.38 kg/m    Body mass index is 25.38 kg/m .  Physical Exam   GENERAL: healthy, alert and no distress  NECK: no adenopathy, no asymmetry, masses, or scars and thyroid normal to palpation  RESP: lungs clear to auscultation - no rales, rhonchi or wheezes  CV: regular rate and rhythm, normal S1 S2, no S3 or S4, no murmur, click or rub, no peripheral edema and peripheral pulses strong  ABDOMEN: soft, nontender, no hepatosplenomegaly, no masses and bowel sounds normal  SKIN: no suspicious lesions or rashes      Results for orders placed or performed in visit on 05/11/22 (from the past 24 hour(s))   Streptococcus A Rapid Screen w/Reflex to PCR - Clinic Collect    Specimen: Throat; Swab   Result Value Ref Range    Group A Strep antigen Negative Negative   CBC with platelets and differential    Narrative    The following orders were created for panel order CBC with platelets and differential.  Procedure                               Abnormality         Status                     ---------                               -----------         ------                     CBC with platelets and d...[[id number]]                      Final result                 Please view results for these tests on the individual orders.   ESR: Erythrocyte sedimentation rate   Result Value Ref Range    Erythrocyte Sedimentation Rate 16 0 - 30 mm/hr   CBC with platelets and differential   Result Value Ref Range    WBC Count 6.9 4.0 - 11.0 10e3/uL    RBC Count 5.05 3.80 - 5.20 10e6/uL    Hemoglobin 14.0 11.7 - 15.7 g/dL    Hematocrit 43.1 35.0 - 47.0 %    MCV 85 78 - 100 fL    MCH 27.7 26.5 - 33.0 pg    MCHC 32.5 31.5 - " 36.5 g/dL    RDW 13.2 10.0 - 15.0 %    Platelet Count 269 150 - 450 10e3/uL    % Neutrophils 73 %    % Lymphocytes 19 %    % Monocytes 7 %    % Eosinophils 1 %    % Basophils 0 %    % Immature Granulocytes 0 %    Absolute Neutrophils 5.0 1.6 - 8.3 10e3/uL    Absolute Lymphocytes 1.3 0.8 - 5.3 10e3/uL    Absolute Monocytes 0.5 0.0 - 1.3 10e3/uL    Absolute Eosinophils 0.1 0.0 - 0.7 10e3/uL    Absolute Basophils 0.0 0.0 - 0.2 10e3/uL    Absolute Immature Granulocytes 0.0 <=0.4 10e3/uL

## 2022-05-11 NOTE — LETTER
May 12, 2022      Martina J Chuck  [address] [place] [phone]        Dear ,    We are writing to inform you of your test results.    Your test results fall within the expected range(s) or remain unchanged from previous results.  Please continue with current treatment plan. See you on Wednesday.    Resulted Orders   Streptococcus A Rapid Screen w/Reflex to PCR - Clinic Collect   Result Value Ref Range    Group A Strep antigen Negative Negative   ESR: Erythrocyte sedimentation rate   Result Value Ref Range    Erythrocyte Sedimentation Rate 16 0 - 30 mm/hr   CBC with platelets and differential   Result Value Ref Range    WBC Count 6.9 4.0 - 11.0 10e3/uL    RBC Count 5.05 3.80 - 5.20 10e6/uL    Hemoglobin 14.0 11.7 - 15.7 g/dL    Hematocrit 43.1 35.0 - 47.0 %    MCV 85 78 - 100 fL    MCH 27.7 26.5 - 33.0 pg    MCHC 32.5 31.5 - 36.5 g/dL    RDW 13.2 10.0 - 15.0 %    Platelet Count 269 150 - 450 10e3/uL    % Neutrophils 73 %    % Lymphocytes 19 %    % Monocytes 7 %    % Eosinophils 1 %    % Basophils 0 %    % Immature Granulocytes 0 %    Absolute Neutrophils 5.0 1.6 - 8.3 10e3/uL    Absolute Lymphocytes 1.3 0.8 - 5.3 10e3/uL    Absolute Monocytes 0.5 0.0 - 1.3 10e3/uL    Absolute Eosinophils 0.1 0.0 - 0.7 10e3/uL    Absolute Basophils 0.0 0.0 - 0.2 10e3/uL    Absolute Immature Granulocytes 0.0 <=0.4 10e3/uL   Group A Streptococcus PCR Throat Swab   Result Value Ref Range    Group A strep by PCR Not Detected Not Detected    Narrative    The Xpert Xpress Strep A test, performed on the [employer], is a rapid, qualitative in vitro diagnostic test for the detection of Streptococcus pyogenes (Group A ß-hemolytic Streptococcus, Strep A) in throat swab specimens from patients with signs and symptoms of pharyngitis. The Xpert Xpress Strep A test can be used as an aid in the diagnosis of Group A Streptococcal pharyngitis. The assay is not intended to monitor treatment for  Group A Streptococcus infections. The Xpert Xpress Strep A test utilizes an automated real-time polymerase chain reaction (PCR) to detect Streptococcus pyogenes DNA.       If you have any questions or concerns, please call the clinic at the number listed above.       Sincerely,      Justina Laws MD

## 2022-05-11 NOTE — LETTER
May 11, 2022      Martina Woods  [address] [place] [phone]        To Whom It May Concern:    Martina Woods  was seen on 5/11/2022.  Please excuse her until 5/16/22 due to illness.        Sincerely,        Justina Laws MD

## 2022-05-18 ENCOUNTER — OFFICE VISIT (OUTPATIENT)
Dept: FAMILY MEDICINE | Facility: CLINIC | Age: 52
End: 2022-05-18
Payer: COMMERCIAL

## 2022-05-18 VITALS
SYSTOLIC BLOOD PRESSURE: 128 MMHG | BODY MASS INDEX: 25.65 KG/M2 | HEART RATE: 110 BPM | DIASTOLIC BLOOD PRESSURE: 72 MMHG | OXYGEN SATURATION: 98 % | WEIGHT: 142.5 LBS

## 2022-05-18 DIAGNOSIS — F41.1 GENERALIZED ANXIETY DISORDER: Primary | ICD-10-CM

## 2022-05-18 DIAGNOSIS — G43.809 OTHER MIGRAINE WITHOUT STATUS MIGRAINOSUS, NOT INTRACTABLE: ICD-10-CM

## 2022-05-18 PROCEDURE — 99213 OFFICE O/P EST LOW 20 MIN: CPT | Performed by: FAMILY MEDICINE

## 2022-05-18 RX ORDER — ESCITALOPRAM OXALATE 20 MG/1
20 TABLET ORAL DAILY
Qty: 90 TABLET | Refills: 0 | Status: SHIPPED | OUTPATIENT
Start: 2022-05-18 | End: 2024-05-22

## 2022-05-18 ASSESSMENT — ANXIETY QUESTIONNAIRES
8. IF YOU CHECKED OFF ANY PROBLEMS, HOW DIFFICULT HAVE THESE MADE IT FOR YOU TO DO YOUR WORK, TAKE CARE OF THINGS AT HOME, OR GET ALONG WITH OTHER PEOPLE?: VERY DIFFICULT
GAD7 TOTAL SCORE: 11
6. BECOMING EASILY ANNOYED OR IRRITABLE: MORE THAN HALF THE DAYS
2. NOT BEING ABLE TO STOP OR CONTROL WORRYING: MORE THAN HALF THE DAYS
7. FEELING AFRAID AS IF SOMETHING AWFUL MIGHT HAPPEN: SEVERAL DAYS
5. BEING SO RESTLESS THAT IT IS HARD TO SIT STILL: SEVERAL DAYS
4. TROUBLE RELAXING: SEVERAL DAYS
1. FEELING NERVOUS, ANXIOUS, OR ON EDGE: MORE THAN HALF THE DAYS
3. WORRYING TOO MUCH ABOUT DIFFERENT THINGS: MORE THAN HALF THE DAYS
7. FEELING AFRAID AS IF SOMETHING AWFUL MIGHT HAPPEN: SEVERAL DAYS
GAD7 TOTAL SCORE: 11
GAD7 TOTAL SCORE: 11

## 2022-05-18 ASSESSMENT — PATIENT HEALTH QUESTIONNAIRE - PHQ9
SUM OF ALL RESPONSES TO PHQ QUESTIONS 1-9: 9
10. IF YOU CHECKED OFF ANY PROBLEMS, HOW DIFFICULT HAVE THESE PROBLEMS MADE IT FOR YOU TO DO YOUR WORK, TAKE CARE OF THINGS AT HOME, OR GET ALONG WITH OTHER PEOPLE: VERY DIFFICULT
SUM OF ALL RESPONSES TO PHQ QUESTIONS 1-9: 9

## 2022-05-18 ASSESSMENT — ASTHMA QUESTIONNAIRES: ACT_TOTALSCORE: 24

## 2022-06-17 ENCOUNTER — OFFICE VISIT (OUTPATIENT)
Dept: FAMILY MEDICINE | Facility: CLINIC | Age: 52
End: 2022-06-17
Payer: COMMERCIAL

## 2022-06-17 VITALS
OXYGEN SATURATION: 98 % | TEMPERATURE: 99.4 F | DIASTOLIC BLOOD PRESSURE: 76 MMHG | WEIGHT: 140 LBS | RESPIRATION RATE: 16 BRPM | HEIGHT: 63 IN | SYSTOLIC BLOOD PRESSURE: 111 MMHG | BODY MASS INDEX: 24.8 KG/M2 | HEART RATE: 72 BPM

## 2022-06-17 DIAGNOSIS — J01.90 ACUTE SINUSITIS, RECURRENCE NOT SPECIFIED, UNSPECIFIED LOCATION: Primary | ICD-10-CM

## 2022-06-17 DIAGNOSIS — Z20.822 SUSPECTED 2019 NOVEL CORONAVIRUS INFECTION: ICD-10-CM

## 2022-06-17 PROCEDURE — U0003 INFECTIOUS AGENT DETECTION BY NUCLEIC ACID (DNA OR RNA); SEVERE ACUTE RESPIRATORY SYNDROME CORONAVIRUS 2 (SARS-COV-2) (CORONAVIRUS DISEASE [COVID-19]), AMPLIFIED PROBE TECHNIQUE, MAKING USE OF HIGH THROUGHPUT TECHNOLOGIES AS DESCRIBED BY CMS-2020-01-R: HCPCS | Performed by: PHYSICIAN ASSISTANT

## 2022-06-17 PROCEDURE — 99213 OFFICE O/P EST LOW 20 MIN: CPT | Mod: CS | Performed by: PHYSICIAN ASSISTANT

## 2022-06-17 PROCEDURE — U0005 INFEC AGEN DETEC AMPLI PROBE: HCPCS | Performed by: PHYSICIAN ASSISTANT

## 2022-06-17 NOTE — PROGRESS NOTES
Assessment & Plan     Acute sinusitis, recurrence not specified, unspecified location  augmentin as ordered.   Push fluids, rest and ibuprofen or tylenol for comfort.    RTC for persistent or worsening sx.   - amoxicillin-clavulanate (AUGMENTIN) 875-125 MG tablet  Dispense: 20 tablet; Refill: 0  - Symptomatic; Yes; 6/12/2022 COVID-19 Virus (Coronavirus) by PCR Nose    Suspected 2019 novel coronavirus infection    Isolate awaiting results  However, she is 7 days + from onset of sx, no treatment indicated at this point as too late for oral antivirals and likely not a candidate for monoclonals with no high risk factors.      - amoxicillin-clavulanate (AUGMENTIN) 875-125 MG tablet  Dispense: 20 tablet; Refill: 0  - Symptomatic; Yes; 6/12/2022 COVID-19 Virus (Coronavirus) by PCR Nose            No follow-ups on file.    Rakel Canseco PA-C  [facility]    Teto Mack is a 52 year old accompanied by her    presenting for the following health issues:  Sinus Problem (X 6 days), Hoarse, Plugged Ears, and Cough (Cough,lpugged ears,sinus pressure and hoarseness x 6 days)  prior to onset of sinus pain, pressure had been dealing with her environmental allergies.      History of Present Illness       Reason for visit:  Sinus infection  Symptom onset:  3-7 days ago  Symptom intensity:  Severe  Symptom progression:  Staying the same  Had these symptoms before:  Yes  Has tried/received treatment for these symptoms:  Yes  Previous treatment was successful:  Yes  Prior treatment description:  Antibiotics  What makes it worse:  Night time  What makes it better:  Sleeping and resting    She eats 0-1 servings of fruits and vegetables daily.She consumes 1 sweetened beverage(s) daily.She exercises with enough effort to increase her heart rate 10 to 19 minutes per day.  She exercises with enough effort to increase her heart rate 3 or less days per week.   She is taking medications regularly.    "    6+ day hx of raspy voice, congestion, discharge, pressure in the ears. Sinuses are full, fatigue.  Green thick discharge. Progressively worsening     Facial pain, no tooth pain.    Headaches.    Fever at night, chills at night.    No known exposures.  No sob, difficulty breathing, chest pain.  No hx of sinus surgery          Review of Systems   Constitutional, HEENT, cardiovascular, pulmonary, gi and gu systems are negative, except as otherwise noted.      Objective    /76 (BP Location: Right arm, Patient Position: Sitting, Cuff Size: Adult Regular)   Pulse 72   Temp 99.4  F (37.4  C) (Tympanic)   Resp 16   Ht 1.588 m (5' 2.5\")   Wt 63.5 kg (140 lb)   SpO2 98%   BMI 25.20 kg/m    Body mass index is 25.2 kg/m .  Physical Exam   Pt is in no acute distress and appears well  Ears patent B:  TM s intact, non-injected. All land marks easily visibile    Nasal mucosa is non-edematous, mucoid discharge.  TTP of the maxillary sinuses    Pharynx: non erythematous, tonsils non hypertrophied, No exudate   Neck supple: no adenopathy  Lungs: CTA  Heart: RRR, no murmur, no thrills or heaves   Ext: no edema  Skin: no rashes      No results found for any visits on 06/17/22.          .  ..  "

## 2022-06-18 ENCOUNTER — NURSE TRIAGE (OUTPATIENT)
Dept: NURSING | Facility: CLINIC | Age: 52
End: 2022-06-18
Payer: COMMERCIAL

## 2022-06-18 LAB — SARS-COV-2 RNA RESP QL NAA+PROBE: NEGATIVE

## 2022-06-18 NOTE — TELEPHONE ENCOUNTER
6/19/22 saw Rakel Canseco PA-C.   DX with acute sinusitis.  Was prescribed Augmentin, has not started to take it yet.  Today C/O     left ear pain    walking unsteady    Had a fever 101.2 (forehead) last night.  No fever today.       Has tried Tylenol, Benadryl and warm packs.  Denies drainage from ear.      Request ear drops from her PCP Dr. Lund.  Patient would like to use the [facility] ([phone]) [facility].  This pharmacy is closed until Monday. Does not want to use another pharmacy.   Writer will send message to provider. FYI patient told writer she does not have health insurance.    Advised to start taking Augment. Encouraged to call back as needed.    Monse Billings RN, [facility] Triage Nurse Advisor    Reason for Disposition    Walking is very unsteady    Additional Information    Negative: [1] Stiff neck (unable to touch chin to chest) AND [2] fever    Negative: [1] Bony area of skull behind the ear is pink or swollen AND [2] fever    Negative: Fever > 104 F (40 C)    Negative: Patient sounds very sick or weak to the triager    Negative: [1] SEVERE pain AND [2] not improved 2 hours after taking analgesic medication (e.g., ibuprofen or acetaminophen)    Protocols used: EARACHE-A-AH

## 2022-06-20 ENCOUNTER — OFFICE VISIT (OUTPATIENT)
Dept: FAMILY MEDICINE | Facility: CLINIC | Age: 52
End: 2022-06-20
Payer: COMMERCIAL

## 2022-06-20 VITALS
DIASTOLIC BLOOD PRESSURE: 78 MMHG | SYSTOLIC BLOOD PRESSURE: 118 MMHG | TEMPERATURE: 98.4 F | HEART RATE: 98 BPM | BODY MASS INDEX: 25.2 KG/M2 | WEIGHT: 140 LBS

## 2022-06-20 DIAGNOSIS — J70.9: ICD-10-CM

## 2022-06-20 DIAGNOSIS — J01.01 ACUTE RECURRENT MAXILLARY SINUSITIS: Primary | ICD-10-CM

## 2022-06-20 PROCEDURE — 99213 OFFICE O/P EST LOW 20 MIN: CPT | Performed by: PHYSICIAN ASSISTANT

## 2022-06-20 RX ORDER — CEFDINIR 300 MG/1
300 CAPSULE ORAL 2 TIMES DAILY
Qty: 20 CAPSULE | Refills: 0 | Status: SHIPPED | OUTPATIENT
Start: 2022-06-20 | End: 2022-06-30

## 2022-06-20 RX ORDER — PREDNISONE 20 MG/1
20 TABLET ORAL DAILY
Qty: 7 TABLET | Refills: 0 | Status: SHIPPED | OUTPATIENT
Start: 2022-06-20 | End: 2024-05-22

## 2022-06-20 ASSESSMENT — ENCOUNTER SYMPTOMS
CHOKING: 0
CONSTITUTIONAL NEGATIVE: 1
COUGH: 1
SINUS PAIN: 1
SHORTNESS OF BREATH: 0
SINUS PRESSURE: 1
RHINORRHEA: 1
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
## 2022-06-20 NOTE — RESULT ENCOUNTER NOTE
I called pt and gave her BAM covid result message.  Pt states she is still having some ear pain and would like Follow-up with [facility] if possilbe.  Pt was transferred to scheduling. Percy Beckett CMA

## 2024-05-22 ENCOUNTER — OFFICE VISIT (OUTPATIENT)
Dept: FAMILY MEDICINE | Facility: CLINIC | Age: 54
End: 2024-05-22

## 2024-05-22 VITALS
DIASTOLIC BLOOD PRESSURE: 70 MMHG | WEIGHT: 154 LBS | SYSTOLIC BLOOD PRESSURE: 114 MMHG | HEART RATE: 93 BPM | RESPIRATION RATE: 10 BRPM | OXYGEN SATURATION: 98 % | HEIGHT: 63 IN | BODY MASS INDEX: 27.29 KG/M2

## 2024-05-22 DIAGNOSIS — S81.812D: Primary | ICD-10-CM

## 2024-05-22 DIAGNOSIS — E03.4 ACQUIRED ATROPHY OF THYROID: ICD-10-CM

## 2024-05-22 PROCEDURE — 99213 OFFICE O/P EST LOW 20 MIN: CPT | Performed by: FAMILY MEDICINE

## 2024-05-22 RX ORDER — LEVOTHYROXINE SODIUM 88 UG/1
88 TABLET ORAL DAILY
Qty: 30 TABLET | Refills: 11 | Status: SHIPPED | OUTPATIENT
Start: 2024-05-22 | End: 2024-07-12

## 2024-05-22 ASSESSMENT — ASTHMA QUESTIONNAIRES
ACT_TOTALSCORE: 25
QUESTION_5 LAST FOUR WEEKS HOW WOULD YOU RATE YOUR ASTHMA CONTROL: COMPLETELY CONTROLLED
ACT_TOTALSCORE: 25
QUESTION_1 LAST FOUR WEEKS HOW MUCH OF THE TIME DID YOUR ASTHMA KEEP YOU FROM GETTING AS MUCH DONE AT WORK, SCHOOL OR AT HOME: NONE OF THE TIME
QUESTION_3 LAST FOUR WEEKS HOW OFTEN DID YOUR ASTHMA SYMPTOMS (WHEEZING, COUGHING, SHORTNESS OF BREATH, CHEST TIGHTNESS OR PAIN) WAKE YOU UP AT NIGHT OR EARLIER THAN USUAL IN THE MORNING: NOT AT ALL
QUESTION_2 LAST FOUR WEEKS HOW OFTEN HAVE YOU HAD SHORTNESS OF BREATH: NOT AT ALL
QUESTION_4 LAST FOUR WEEKS HOW OFTEN HAVE YOU USED YOUR RESCUE INHALER OR NEBULIZER MEDICATION (SUCH AS ALBUTEROL): NOT AT ALL

## 2024-05-22 NOTE — PROGRESS NOTES
"  Assessment & Plan     Laceration of lower extremity, left, subsequent encounter  Healing appropriately, recommend leaving the Steri-Strips in place for 10 days from day of injury in order to avoid disrupting glue.  Offered tetanus booster but she declines.  Keep area clean and dry.  Can put a nonstick dressing and Coban on it as needed.  Follow-up if any new concerns.  Did encourage patient to start doing gentle range of motion as tolerated to the left knee to avoid stiffness    Acquired atrophy of thyroid  Patient with known hypothyroidism, unable to get access to medications or labs due to insurance issues.  I did refill the levothyroxine.  Consider doing express labs.  Also gave her information about free clinic  - levothyroxine (SYNTHROID/LEVOTHROID) 88 MCG tablet; Take 1 tablet (88 mcg) by mouth daily          BMI  Estimated body mass index is 27.29 kg/m  as calculated from the following:    Height as of this encounter: 1.6 m (5' 2.99\").    Weight as of this encounter: 69.9 kg (154 lb).             Teto Mack is a 53 year old, presenting for the following health issues:  Fall (Hit Head - /Left Knee - )        5/22/2024    10:37 AM   Additional Questions   Roomed by Spring MENDOZA CMA   Accompanied by Spouse     History of Present Illness       Reason for visit:  Fallen and have injurys to knee head and elbow, fell on 5/19, seen in ER, reviewed records, normal knee XR, laceration closed with glue and steri-strips, noted that she refused tetanus booster due to fear of needles, continues to decline today  Symptom onset:  1-3 days ago  Symptoms include:  Pain swelling bruising  Symptom intensity:  Moderate  Symptom progression:  Improving  Had these symptoms before:  No  What makes it worse:  Bending and getting bumped  What makes it better:  Tylonol and rest    She eats 0-1 servings of fruits and vegetables daily.She consumes 1 sweetened beverage(s) daily.She exercises with enough effort to increase her " "heart rate 9 or less minutes per day.  She exercises with enough effort to increase her heart rate 3 or less days per week.   She is taking medications regularly.                 Objective    /70   Pulse 93   Resp 10   Ht 1.6 m (5' 2.99\")   Wt 69.9 kg (154 lb)   LMP  (LMP Unknown)   SpO2 98%   BMI 27.29 kg/m    Body mass index is 27.29 kg/m .  Physical Exam     Alert cooperative in no acute distress  On forehead has small area of bruising with small well-healing incision  Right elbow appears to be healing well  Left knee with large 4 cm area covered with Steri-Strips.  Overall these are holding up well.  Did remove 2 superficial ones that were minimally hanging on.  Discussed wrapping with nonstick gauze and Coban as needed to keep dressing in place.            Signed Electronically by: Justina Laws MD    "

## 2024-05-22 NOTE — LETTER
May 22, 2024      Martina Woods  [address] [place] [id number]        To Whom It May Concern:    Martina Woods  was seen on 5/22/24.  Please excuse her  until 6/3/2024 due to injury.        Sincerely,        Justina Laws MD

## 2024-07-11 ENCOUNTER — OFFICE VISIT (OUTPATIENT)
Dept: FAMILY MEDICINE | Facility: CLINIC | Age: 54
End: 2024-07-11
Payer: MEDICAID

## 2024-07-11 ENCOUNTER — TELEPHONE (OUTPATIENT)
Dept: FAMILY MEDICINE | Facility: CLINIC | Age: 54
End: 2024-07-11

## 2024-07-11 VITALS
TEMPERATURE: 98.4 F | DIASTOLIC BLOOD PRESSURE: 78 MMHG | RESPIRATION RATE: 16 BRPM | SYSTOLIC BLOOD PRESSURE: 121 MMHG | HEIGHT: 63 IN | BODY MASS INDEX: 27.57 KG/M2 | WEIGHT: 155.6 LBS | OXYGEN SATURATION: 99 % | HEART RATE: 99 BPM

## 2024-07-11 DIAGNOSIS — Z53.8 PAP SMEAR OF CERVIX NOT NEEDED: ICD-10-CM

## 2024-07-11 DIAGNOSIS — E03.4 ACQUIRED ATROPHY OF THYROID: ICD-10-CM

## 2024-07-11 DIAGNOSIS — R30.0 DYSURIA: Primary | ICD-10-CM

## 2024-07-11 DIAGNOSIS — Z12.31 VISIT FOR SCREENING MAMMOGRAM: ICD-10-CM

## 2024-07-11 LAB
ALBUMIN UR-MCNC: NEGATIVE MG/DL
ANION GAP SERPL CALCULATED.3IONS-SCNC: 10 MMOL/L (ref 7–15)
APPEARANCE UR: CLEAR
BACTERIA #/AREA URNS HPF: ABNORMAL /HPF
BILIRUB UR QL STRIP: NEGATIVE
BUN SERPL-MCNC: 19.7 MG/DL (ref 6–20)
CALCIUM SERPL-MCNC: 9.2 MG/DL (ref 8.6–10)
CHLORIDE SERPL-SCNC: 103 MMOL/L (ref 98–107)
CLUE CELLS: ABNORMAL
COLOR UR AUTO: YELLOW
CREAT SERPL-MCNC: 0.71 MG/DL (ref 0.51–0.95)
DEPRECATED HCO3 PLAS-SCNC: 27 MMOL/L (ref 22–29)
EGFRCR SERPLBLD CKD-EPI 2021: >90 ML/MIN/1.73M2
GLUCOSE SERPL-MCNC: 89 MG/DL (ref 70–99)
GLUCOSE UR STRIP-MCNC: NEGATIVE MG/DL
HGB UR QL STRIP: NEGATIVE
KETONES UR STRIP-MCNC: NEGATIVE MG/DL
LEUKOCYTE ESTERASE UR QL STRIP: ABNORMAL
MUCOUS THREADS #/AREA URNS LPF: PRESENT /LPF
NITRATE UR QL: NEGATIVE
PH UR STRIP: 6 [PH] (ref 5–7)
POTASSIUM SERPL-SCNC: 4.5 MMOL/L (ref 3.4–5.3)
RBC #/AREA URNS AUTO: ABNORMAL /HPF
SODIUM SERPL-SCNC: 140 MMOL/L (ref 135–145)
SP GR UR STRIP: >=1.03 (ref 1–1.03)
SQUAMOUS #/AREA URNS AUTO: ABNORMAL /LPF
T4 FREE SERPL-MCNC: 0.71 NG/DL (ref 0.9–1.7)
TRANS CELLS #/AREA URNS HPF: ABNORMAL /HPF
TRICHOMONAS, WET PREP: ABNORMAL
TSH SERPL DL<=0.005 MIU/L-ACNC: 11.1 UIU/ML (ref 0.3–4.2)
UROBILINOGEN UR STRIP-ACNC: 0.2 E.U./DL
WBC #/AREA URNS AUTO: ABNORMAL /HPF
WBC CLUMPS #/AREA URNS HPF: PRESENT /HPF
WBC'S/HIGH POWER FIELD, WET PREP: ABNORMAL
YEAST, WET PREP: ABNORMAL

## 2024-07-11 PROCEDURE — 99213 OFFICE O/P EST LOW 20 MIN: CPT

## 2024-07-11 PROCEDURE — 81001 URINALYSIS AUTO W/SCOPE: CPT

## 2024-07-11 PROCEDURE — 84439 ASSAY OF FREE THYROXINE: CPT

## 2024-07-11 PROCEDURE — 84443 ASSAY THYROID STIM HORMONE: CPT

## 2024-07-11 PROCEDURE — 36415 COLL VENOUS BLD VENIPUNCTURE: CPT

## 2024-07-11 PROCEDURE — 87210 SMEAR WET MOUNT SALINE/INK: CPT | Mod: QW

## 2024-07-11 PROCEDURE — 80048 BASIC METABOLIC PNL TOTAL CA: CPT

## 2024-07-11 RX ORDER — NITROFURANTOIN 25; 75 MG/1; MG/1
100 CAPSULE ORAL 2 TIMES DAILY
Qty: 10 CAPSULE | Refills: 0 | Status: SHIPPED | OUTPATIENT
Start: 2024-07-11 | End: 2024-07-16

## 2024-07-11 NOTE — TELEPHONE ENCOUNTER
Patient was transferred from [facility].  PATIENT would like to schedule appointment for possible Yeast or Bacterial Infection.    Patient is scheduled today.

## 2024-07-11 NOTE — PROGRESS NOTES
"  Assessment & Plan     Dysuria  Leukocyte esterase on UA but no ntiirites. Wet prep shows WBCs only. Will treat as eatly UTI and patient to return to clinic if not improved. Also discussed may be related to vaginal dryness post menopausally  - UA Macroscopic with reflex to Microscopic and Culture - Clinic Collect  - UA Microscopic with Reflex to Culture  - Wet prep - Clinic Collect  - nitroFURantoin macrocrystal-monohydrate (MACROBID) 100 MG capsule; Take 1 capsule (100 mg) by mouth 2 times daily for 5 days    Visit for screening mammogram  Order placed, patient given number to call [facility]. Order faxed.     Acquired atrophy of thyroid  Has not been taking levothyroxine or had recently checked due to insurance. Has been taking thyroid supplement. Will check TSH.     Pap smear of cervix not needed  Patient reports had uterus, cervix and left ovary removed in 2015      BMI  Estimated body mass index is 27.56 kg/m  as calculated from the following:    Height as of this encounter: 1.6 m (5' 3\").    Weight as of this encounter: 70.6 kg (155 lb 9.6 oz).             Teto Mack is a 54 year old, presenting for the following health issues:  UTI (Dysuria x 7 days )        7/11/2024    12:25 PM   Additional Questions   Roomed by SHER Lopez     She reports she has been having some burning with urintation and abnormal vaginal odor.  Not having any other symptoms, no abdominal pain, no fevers.  No CVA tenderness on exam.    History of Present Illness       Reason for visit:  Yeast or bladder infection    She eats 0-1 servings of fruits and vegetables daily.She consumes 1 sweetened beverage(s) daily.She exercises with enough effort to increase her heart rate 9 or less minutes per day.  She exercises with enough effort to increase her heart rate 3 or less days per week.   She is taking medications regularly.                     Objective    /78 (BP Location: Right arm, Patient Position: Sitting, Cuff Size: Adult " "Regular)   Pulse 99   Temp 98.4  F (36.9  C) (Oral)   Resp 16   Ht 1.6 m (5' 3\")   Wt 70.6 kg (155 lb 9.6 oz)   LMP  (LMP Unknown)   SpO2 99%   BMI 27.56 kg/m    Body mass index is 27.56 kg/m .  Physical Exam  HENT:      Head: Normocephalic.      Mouth/Throat:      Mouth: Mucous membranes are moist.   Eyes:      Extraocular Movements: Extraocular movements intact.      Conjunctiva/sclera: Conjunctivae normal.   Cardiovascular:      Rate and Rhythm: Normal rate.   Pulmonary:      Effort: Pulmonary effort is normal.   Abdominal:      General: Bowel sounds are normal.      Tenderness: There is no abdominal tenderness. There is no right CVA tenderness or left CVA tenderness.   Skin:     General: Skin is warm and dry.      Capillary Refill: Capillary refill takes less than 2 seconds.   Neurological:      Mental Status: She is alert and oriented to person, place, and time.   Psychiatric:         Behavior: Behavior normal.         Thought Content: Thought content normal.                    Signed Electronically by: RAMON Keyes CNP    "

## 2024-07-12 ENCOUNTER — TELEPHONE (OUTPATIENT)
Dept: FAMILY MEDICINE | Facility: CLINIC | Age: 54
End: 2024-07-12
Payer: MEDICAID

## 2024-07-12 DIAGNOSIS — E03.4 ACQUIRED ATROPHY OF THYROID: ICD-10-CM

## 2024-07-12 RX ORDER — LEVOTHYROXINE SODIUM 88 UG/1
88 TABLET ORAL DAILY
Qty: 90 TABLET | Refills: 3 | Status: SHIPPED | OUTPATIENT
Start: 2024-07-12

## 2024-07-12 NOTE — TELEPHONE ENCOUNTER
Spoke with patient regarding her elevated TSH and low free T4.  Patient is hesitate to take medication, we discussed some of the cardiovascular risks that are associated with untreated hypothyroidism.  Patient reports that her sister  of congestive heart failure.  She is open to taking the 88 mcg daily tablet.  She is encouraged to follow-up in 3 months for a recheck.  She does express some concern about whether or not her insurance will last through the summer.  She is off work with the [employer] district of [place] during the summer and therefore qualifies for forward health.  She believes she may lose this insurance when she begins working again in the fall.    Patient given 90-day supply of levothyroxine at 88 mcg with 3 refills.  She is encouraged to continue taking this medication and follow-up in 3 months for lab.  Patient is advised that there is express lab where she may pay out-of-pocket for a reduced rate as well as support for getting levothyroxine medications.    RAMON Keyes CNP on 2024 at 3:33 PM

## 2024-07-18 ENCOUNTER — OFFICE VISIT (OUTPATIENT)
Dept: FAMILY MEDICINE | Facility: CLINIC | Age: 54
End: 2024-07-18
Payer: MEDICAID

## 2024-07-18 VITALS
OXYGEN SATURATION: 100 % | RESPIRATION RATE: 16 BRPM | WEIGHT: 156 LBS | BODY MASS INDEX: 27.64 KG/M2 | DIASTOLIC BLOOD PRESSURE: 83 MMHG | TEMPERATURE: 97.9 F | HEART RATE: 91 BPM | SYSTOLIC BLOOD PRESSURE: 121 MMHG | HEIGHT: 63 IN

## 2024-07-18 DIAGNOSIS — Z12.4 CERVICAL CANCER SCREENING: Primary | ICD-10-CM

## 2024-07-18 DIAGNOSIS — R10.31 RLQ ABDOMINAL PAIN: ICD-10-CM

## 2024-07-18 PROCEDURE — 99213 OFFICE O/P EST LOW 20 MIN: CPT

## 2024-07-18 NOTE — PROGRESS NOTES
"  Assessment & Plan       RLQ abdominal pain  Patient continues to have right lower quadrant abdominal pain.  Previous evaluation done with UA and wet prep.  Borderline UTI was treated with Macrobid, no clue cells or yeast seen on wet prep.  Patient does have a history of ovarian cysts and has also felt that there is a bulge in this area at times.  No protrusion appreciated standing with Valsalva or lying down.  Plan to do ultrasound to assess for ovarian cysts as well as potential right lower abdomen/groin hernia.  Patient to be notified of results and plan of care just as needed.  - US Pelvic Complete with Transvaginal; Future  - US Hernia Evaluation; Future    Cervical cancer screening  Patient has had hysterectomy with unilateral salpingo-oophorectomy on left side.  Patient does still have right ovary.  Is unclear whether patient still has cervix, patient reports that she does not but I am not able to confirm this in her medical record.  Will keep Pap on her health maintenance.      BMI  Estimated body mass index is 27.63 kg/m  as calculated from the following:    Height as of this encounter: 1.6 m (5' 3\").    Weight as of this encounter: 70.8 kg (156 lb).             Teto Mack is a 54 year old, presenting for the following health issues:  RECHECK (Follow up, right side lower abdominal pain)        7/18/2024    12:08 PM   Additional Questions   Roomed by SHER Lopez     History of Present Illness       Reason for visit:  Yeast or bladder infection    She eats 0-1 servings of fruits and vegetables daily.She consumes 1 sweetened beverage(s) daily.She exercises with enough effort to increase her heart rate 9 or less minutes per day.  She exercises with enough effort to increase her heart rate 3 or less days per week.   She is taking medications regularly.                     Objective    /83 (BP Location: Right arm, Patient Position: Sitting, Cuff Size: Adult Regular)   Pulse 91   Temp 97.9  F " "(36.6  C) (Oral)   Resp 16   Ht 1.6 m (5' 3\")   Wt 70.8 kg (156 lb)   LMP  (LMP Unknown)   SpO2 100%   BMI 27.63 kg/m    Body mass index is 27.63 kg/m .  Physical Exam   GENERAL: alert and no distress  EYES: Eyes grossly normal to inspection, PERRL and conjunctivae and sclerae normal  HENT: ear canals and TM's normal, nose and mouth without ulcers or lesions  NECK: no adenopathy, no asymmetry, masses, or scars  RESP: lungs clear to auscultation - no rales, rhonchi or wheezes  CV: regular rate and rhythm, normal S1 S2, no S3 or S4, no murmur, click or rub, no peripheral edema  ABDOMEN: soft, nontender, no hepatosplenomegaly, no masses and bowel sounds normal  MS: no gross musculoskeletal defects noted, no edema  SKIN: no suspicious lesions or rashes  NEURO: Normal strength and tone, mentation intact and speech normal  PSYCH: mentation appears normal, affect normal/bright            Signed Electronically by: RAMON Keyes CNP    "

## 2024-08-02 ENCOUNTER — EXTERNAL ORDER RESULTS (OUTPATIENT)
Dept: FAMILY MEDICINE | Facility: CLINIC | Age: 54
End: 2024-08-02
Payer: MEDICAID

## 2024-08-05 ENCOUNTER — TELEPHONE (OUTPATIENT)
Dept: FAMILY MEDICINE | Facility: CLINIC | Age: 54
End: 2024-08-05
Payer: MEDICAID

## 2024-08-05 DIAGNOSIS — R10.11 RUQ ABDOMINAL PAIN: Primary | ICD-10-CM

## 2024-08-05 NOTE — TELEPHONE ENCOUNTER
Test Results    Contacts       Contact Date/Time Type Contact Phone/Fax    08/05/2024 10:23 AM CDT Phone (Incoming) Martina Woods (Self) [phone] ()            Who ordered the test:       Type of test: Lab and Ultrasound    Date of test:  Not sure over a week now     Where was the test performed:  [facility]    What are your questions/concerns?:  need to get result and is having symptoms from Levothyroxine lot of pain from it maybe    Okay to leave a detailed message?: Yes at Cell number on file:    Telephone Information:   Mobile [phone]

## 2024-08-07 NOTE — TELEPHONE ENCOUNTER
Patient called to report ultrasound results after her recent phone call looking for results.  Patient was advised she was also sent out a letter with normal ultrasound results.  Patient reports she had hysterectomy, cervix removed and left oophorectomy in 2005.  Patient reports she was told by ultrasound tech that right ovary may not have been visualized due to scheduling.      Patient reports pain has migrated to RUQ. Pain is not severe.  Not accompanied by nausea vomiting.  Discussed options and patient would like to have complete abdominal ultrasound and liver and gallbladder function testing done.  Patient instructed if symptoms worsen she needs to be seen in clinic and that we will do these tests, patient verbalized understanding.      RAMON Keyes CNP on 8/7/2024 at 2:01 PM

## 2024-08-13 NOTE — PROGRESS NOTES
Assessment & Plan     Generalized anxiety disorder  Patient with worsening of her generalized anxiety disorder with some associated depression.  We will try increasing the dose of her escitalopram.  She has been to follow-up again in 4 weeks.  Encouraged her to seek counseling opportunities and find supportive people.  We will follow-up again in 4 weeks for recheck, sooner if symptoms are not improving or worsen  - escitalopram (LEXAPRO) 20 MG tablet; Take 1 tablet (20 mg) by mouth daily    Migraine headaches  Suspect that headaches are likely exacerbated by stress and anxiety.  Recommend that she continue current treatment and follow-up as needed if not getting better.  No evidence of more severe headache because is appreciated      25 minutes spent on the date of the encounter doing chart review, history and exam, documentation and further activities per the note           Return in about 4 weeks (around 6/15/2022).    Justina Laws MD  [facility]    Teto Mack is a 51 year old who presents for the following health issues     History of Present Illness       Mental Health Follow-up:  Patient presents to follow-up on Depression & Anxiety.Patient's depression since last visit has been:  Medium  The patient is not having other symptoms associated with depression.  Patient's anxiety since last visit has been:  Medium  The patient is not having other symptoms associated with anxiety.  Any significant life events: relationship concerns, job concerns, grief or loss and health concerns  Patient is feeling anxious or having panic attacks.  Patient has no concerns about alcohol or drug use.    Migraines:   Since the patient's last clinic visit, headaches are: improved  The patient is getting headaches:  Once or twice a week  She is able to do normal daily activities when she has a migraine.  The patient is taking the following rescue/relief medications:  Ibuprofen (Advil,  Patient : Dyllan Boogie Age: 49 year old Sex: male   MRN: [id number] Encounter Date: 8/13/2024    History     Chief Complaint   Patient presents with    Wound Check       49-year-old male with past medical history of CKD status post renal transplant, DM2, HTN, recent 5 toe amputation of left foot in March 2024 who presents with nausea, vomiting, fever, chills and left foot ulcer with new drainage.  Patient states 2 days ago he started experiencing symptoms of vomiting, fever, chills, and nausea and noticed drainage coming from left foot, he notes no inciting events or injuries. Denies chest pain or shortness of breath or abdominal pain. Has decreased appetite. Follows up regularly with podiatry, no R foot complaints today.     The history is provided by the patient.     I have reviewed Dyllan Boogie's previous office visit note from 8/5/2024 .  Note Review Summary: Patient seen in wound clinic with Dr. Romberg, [facility] includes history of kidney transplant, diabetic ulcer of left foot associated with DM2, HTN.  He was evaluated for left foot ulceration at this time and treated with antibiotics and to follow-up with Dr. Romberg in 2 weeks.     Past/Family/Social History     Allergies   Allergen Reactions    Beef Allergy   (Food Or Med) Other (See Comments)     Pt preference    Pork Allergy   (Food Or Med) Other (See Comments)     Pt preference       Current Facility-Administered Medications   Medication    vancomycin 2,000 mg in sodium chloride 0.9% 500 mL IVPB    sodium chloride (NORMAL SALINE) 0.9 % bolus 2,500 mL     Current Outpatient Medications   Medication Sig    gabapentin (NEURONTIN) 100 MG capsule Take 1 capsule by mouth at bedtime as needed (left hand neuropathy).    amLODIPine (NORVASC) 5 MG tablet Take 1 tablet by mouth daily.    insulin glargine (LANTUS) 100 UNIT/ML vial solution Inject 18 Units into the skin nightly.    Continuous Glucose  (FreeStyle Vik 2 [place]) Device 1 each as directed. To  "Motrin), Tylenol and Maxalt   Patient states \"I get some relief\" from the rescue/relief medications.   The patient is taking the following medications to prevent migraines:  No medications to prevent migraines  In the past 4 weeks, the patient has gone to an Urgent Care or Emergency Room 0 times times due to headaches.    She eats 0-1 servings of fruits and vegetables daily.She consumes 1 sweetened beverage(s) daily.She exercises with enough effort to increase her heart rate 9 or less minutes per day.  She exercises with enough effort to increase her heart rate 3 or less days per week.   She is taking medications regularly.    Today's PHQ-9         PHQ-9 Total Score: 9    PHQ-9 Q9 Thoughts of better off dead/self-harm past 2 weeks :   Not at all    How difficult have these problems made it for you to do your work, take care of things at home, or get along with other people: Very difficult  Today's HOANG-7 Score: 11       Depression and Anxiety Follow-Up    How are you doing with your depression since your last visit? Worsened, has been struggling with grief, sister has noticed significant changes, patient has not felt well    How are you doing with your anxiety since your last visit?  Worsened, anxious all the time    Are you having other symptoms that might be associated with depression or anxiety? Yes:  headaches, fatigue, anhedonia, overeating, stomachaches, fidgety    Have you had a significant life event? Grief or Loss and OTHER: mom  recently still struggling with dad's death, relationship issues with boyfriend, missing her kids     Do you have any concerns with your use of alcohol or other drugs? 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 Indications: Pain, severe pain    Continuous Glucose Sensor (FreeStyle Vik 3 Sensor) Misc Insert sensor under the skin once every 14 days    metoPROLOL tartrate (LOPRESSOR) 50 MG tablet Take 1 tablet by mouth every 12 hours.    Insulin Lispro, 1 Unit Dial, (HumaLOG KwikPen) 100 UNIT/ML pen-injector Prime 2 units before each dose. Inject 10 Units into the skin in the morning and 10 Units at noon and 10 Units in the evening. Inject before meals. Prime 2 units before each dose. Administer before meals. 0 units if blood sugar . 2 units if 140-180. 3 units if 181-240. 4 units if 241-300. 6 units if 301-350. 9 units if 351-400. 10 units if greater than 400. Max daily amount of 30 units.    Insulin Pen Needle (B-D U/F PEN NEEDLE 5/16\") 31G X 8 MM Misc USE AS DIRECTED TWICE DAILY    rosuvastatin (CRESTOR) 10 MG tablet Take 1 tablet by mouth daily.    mycophenolate (CELLCEPT) 250 MG capsule Take 3 capsules by mouth in the morning and 3 capsules in the evening.    TACROlimus (PROGRAF) 1 MG capsule Take 2 capsules by mouth in the morning and 2 capsules in the evening. Indications: Body's Rejection to a Transplanted Kidney.       Past Medical History:   Diagnosis Date    RYAN (acute kidney injury) (CMD) 03/12/2021    Anemia     Blind right eye     Chronic kidney disease     Diabetes mellitus  (CMD)     Diarrhea 03/12/2021    Dyslipidemia 12/23/2022    Essential hypertension 07/01/2019    Hypercalcemia 12/23/2022    Kidney transplant recipient (CMD) 03/12/2021    PONV (postoperative nausea and vomiting)     Preop cardiovascular exam 07/01/2019    Retinopathy due to secondary diabetes  (CMD)     Type 2 diabetes mellitus  (CMD) 12/16/2022       Past Surgical History:   Procedure Laterality Date    Amputation Left 02/28/2019    All 5 toes amputation    Appendectomy      Av fistula  Smokeless tobacco: Never Used     Tobacco comment: no second hand smoke at home or work   Vaping Use     Vaping Use: Never used   Substance Use Topics     Alcohol use: Yes     Comment: rare     PHQ 5/4/2022 5/18/2022   PHQ-9 Total Score 8 9   Q9: Thoughts of better off dead/self-harm past 2 weeks Not at all Not at all     HOANG-7 SCORE 5/4/2022 5/18/2022   Total Score 10 (moderate anxiety) 11 (moderate anxiety)   Total Score 10 11         Suicide Assessment Five-step Evaluation and Treatment (SAFE-T)        Review of Systems         Objective    /72   Pulse 110   Wt 64.6 kg (142 lb 8 oz)   SpO2 98%   BMI 25.65 kg/m    Body mass index is 25.65 kg/m .  Physical Exam                    placement Left     Eye surgery      Kidney transplant      Retinopathy surgery Right 2014    Rotator cuff repair Left 2012    Toe amputation Right 2023    5th toe       Family History   Problem Relation Age of Onset    Diabetes Mother     Hypertension Mother     COPD Mother     COPD Father     Diabetes Father     Kidney disease Father     Coronary Artery Disease Father     Multiple myeloma Father     Dialysis/ESRD Father     Diabetes Brother        Social History     Tobacco Use    Smoking status: Former     Current packs/day: 0.00     Average packs/day: 0.3 packs/day for 17.0 years (5.1 ttl pk-yrs)     Types: Cigarettes     Start date:      Quit date:      Years since quittin.6     Passive exposure: Past    Smokeless tobacco: Never   Vaping Use    Vaping status: never used   Substance Use Topics    Alcohol use: Yes     Comment: occasionally 3-4 times yearly - 2024    Drug use: Yes     Types: Marijuana     Comment: occasionally - 2 weeks ago          Review of Systems   Review of Symptoms     Review of Systems   Constitutional:  Positive for diaphoresis and fever.   Gastrointestinal:  Positive for nausea and vomiting.          Physical Exam   Physical Exam     ED Triage Vitals   ED Triage Vitals Group      Temp 24 1604 (!) 100.6 °F (38.1 °C)      Heart Rate 24 1604 (!) 136      Resp 24 1604 20      BP 24 1604 (!) 143/90      SpO2 24 1604 100 %      EtCO2 mmHg --       Height --       Weight 24 1858 235 lb (106.6 kg)      Weight Scale Used --       BMI (Calculated) --       IBW/kg (Calculated) --        Physical Exam  Vitals and nursing note reviewed.   Constitutional:       General: He is not in acute distress.     Appearance: Normal appearance. He is diaphoretic. He is not ill-appearing or toxic-appearing.   HENT:      Head: Normocephalic and atraumatic.      Right Ear: External ear normal.      Left Ear: External ear normal.      Nose: Nose normal.       Mouth/Throat:      Mouth: Mucous membranes are moist.      Pharynx: No oropharyngeal exudate.      Neck: Normal range of motion.   Eyes:      General:         Right eye: No discharge.         Left eye: No discharge.      Pupils: Pupils are equal, round, and reactive to light.   Cardiovascular:      Rate and Rhythm: Regular rhythm. Tachycardia present.   Pulmonary:      Effort: Pulmonary effort is normal. No respiratory distress.      Breath sounds: Normal breath sounds. No wheezing.   Abdominal:      General: Abdomen is flat.      Palpations: Abdomen is soft.      Tenderness: There is no abdominal tenderness. There is no guarding.      Comments: Actively vomiting during exam   Musculoskeletal:         General: Normal range of motion.   Feet:      Comments: See uploaded images of L foot   Patient has decreased sensation grossly L foot, L foot sensation is decreased compared to R foot.   L foot NTTP  Skin:     General: Skin is warm.      Capillary Refill: Capillary refill takes 2 to 3 seconds.      Findings: No rash.   Neurological:      General: No focal deficit present.      Mental Status: He is alert.      Sensory: Sensory deficit (decreased sensation L foot) present.   Psychiatric:         Behavior: Behavior normal.                  Procedures   ED Procedures     Procedures       Lab Results   ED Lab     Results for orders placed or performed during the hospital encounter of 08/13/24   Comprehensive Metabolic Panel   Result Value Ref Range    Fasting Status      Sodium 128 (L) 135 - 145 mmol/L    Potassium 4.6 3.4 - 5.1 mmol/L    Chloride 97 97 - 110 mmol/L    Carbon Dioxide 18 (L) 21 - 32 mmol/L    Anion Gap 18 7 - 19 mmol/L    Glucose 280 (H) 70 - 99 mg/dL    BUN 41 (H) 6 - 20 mg/dL    Creatinine 2.63 (H) 0.67 - 1.17 mg/dL    Glomerular Filtration Rate 29 (L) >=60    BUN/Cr 16 7 - 25    Calcium 10.7 (H) 8.4 - 10.2 mg/dL    Bilirubin, Total 1.0 0.2 - 1.0 mg/dL    GOT/AST 17 <=37 Units/L    GPT/ALT 24 <64 Units/L     Alkaline Phosphatase 114 45 - 117 Units/L    Albumin 3.0 (L) 3.6 - 5.1 g/dL    Protein, Total 8.8 (H) 6.4 - 8.2 g/dL    Globulin 5.8 (H) 2.0 - 4.0 g/dL    A/G Ratio 0.5 (L) 1.0 - 2.4   TROPONIN I, HIGH SENSITIVITY   Result Value Ref Range    Troponin I, High Sensitivity 19 <77 ng/L   CBC with Automated Differential (performable only)   Result Value Ref Range    WBC 16.6 (H) 4.2 - 11.0 K/mcL    RBC 4.23 (L) 4.50 - 5.90 mil/mcL    HGB 11.8 (L) 13.0 - 17.0 g/dL    HCT 37.7 (L) 39.0 - 51.0 %    MCV 89.1 78.0 - 100.0 fl    MCH 27.9 26.0 - 34.0 pg    MCHC 31.3 (L) 32.0 - 36.5 g/dL    RDW-CV 15.9 (H) 11.0 - 15.0 %    RDW-SD 51.9 (H) 39.0 - 50.0 fL     140 - 450 K/mcL    NRBC 0 <=0 /100 WBC    Neutrophil, Percent 88 %    Lymphocytes, Percent 4 %    Mono, Percent 6 %    Eosinophils, Percent 0 %    Basophils, Percent 0 %    Immature Granulocytes 2 %    Absolute Neutrophils 14.8 (H) 1.8 - 7.7 K/mcL    Absolute Lymphocytes 0.6 (L) 1.0 - 4.8 K/mcL    Absolute Monocytes 0.9 0.3 - 0.9 K/mcL    Absolute Eosinophils  0.0 0.0 - 0.5 K/mcL    Absolute Basophils 0.1 0.0 - 0.3 K/mcL    Absolute Immature Granulocytes 0.3 (H) 0.0 - 0.2 K/mcL   Lipase   Result Value Ref Range    Lipase 18 15 - 77 Units/L   COVID/Flu/RSV panel   Result Value Ref Range    Rapid SARS-COV-2 by PCR Not Detected Not Detected / Detected / Presumptive Positive / Inhibitors present    Influenza A by PCR Not Detected Not Detected    Influenza B by PCR Not Detected Not Detected    RSV BY PCR Not Detected Not Detected    Isolation Guidelines      Procedural Comment     Lactic Acid, Venous   Result Value Ref Range    Lactate, Venous 1.3 0.0 - 2.0 mmol/L   Blood Culture    Specimen: Blood   Result Value Ref Range    Culture, Blood or Bone Marrow No Growth <24 hours    Blood Culture    Specimen: Blood   Result Value Ref Range    Culture, Blood or Bone Marrow No Growth <24 hours    GLUCOSE, BEDSIDE - POINT OF CARE   Result Value Ref Range    GLUCOSE, BEDSIDE -  POINT OF CARE 271 (H) 70 - 99 mg/dL          EKG     EKG Interpretation  Rate: 124 bpm  Rhythm: sinus tachycardia   No stemi or st depression, no evidence of ischemia    Per my review of the EKG tracing, my findings are listed above, awaiting confirmation from cardiology    Radiology Results   ED Radiology Results     Imaging Results              CT FOOT WO CONTRAST LEFT (In process)  Result time 08/13/24 20:25:32                     XR CHEST AP OR PA (Final result)  Result time 08/13/24 16:39:16   Procedure changed from XR CHEST PA AND LATERAL 2 VIEWS     Final result                   Impression:    Impression:     No radiographic evidence of acute cardiopulmonary disease on this single  frontal view chest radiograph.    Electronically Signed by: ESTRELLITA PRAJAPATI MD   Signed on: 8/13/2024 4:39 PM   Workstation ID: [id number]               Narrative:    Exam: XR CHEST AP OR PA.     Indication:   Pt unsteady for 2 view         Chest Pain                  Comparison: 9/9/2023 chest radiograph.    Findings: Single view of the chest demonstrates normal cardiomediastinal  silhouette. No obvious consolidation or large pleural effusions. No  definite pneumothorax. No acute osseous abnormality                                       XR FOOT 2 VIEWS LEFT (Final result)  Result time 08/13/24 17:26:21      Final result                   Impression:    FINDINGS/IMPRESSION:      3 views left foot were obtained. Radiograph demonstrate decrease osseous  mineralization. Status post amputation at the level of the metatarsal  throughout the digits. There is soft tissue swelling throughout the foot,  predominant involving the midfoot. Subtle areas of possible ulceration  involving the soft tissues overlying the first MTP. The alignment is  unremarkable. No definite fractures. Nonspecific mild periosteal reaction  overlying the stump of the first metatarsal bone and fifth metatarsal bone  without underlying erosive changes or cortical  irregularity.          Electronically Signed by: ESTRELLITA PRAJAPATI MD   Signed on: 8/13/2024 5:26 PM   Workstation ID: [id number]               Narrative:    PROCEDURE:  XR FOOT 2 VIEWS LEFT    CLINICAL INDICATION:   Foot wrapped. Bandages wet.     ro osteo infection                                                                                                                                  COMPARISON: 3/16/2024 foot radiograph.                                         ED Medications   ED Medications     ED Medication Orders (From admission, onward)      Ordered Start     Status Ordering Provider    08/13/24 1946 08/13/24 1947  ondansetron (ZOFRAN) injection 4 mg  ONCE         Last MAR action: Given DWAIN[place]OW [place]IA H    08/13/24 1904 08/13/24 1905  vancomycin 2,000 mg in sodium chloride 0.9% 500 mL IVPB  ONCE         Last MAR action: New DINAH Regalado    08/13/24 1607 08/13/24 1608  acetaminophen (TYLENOL) tablet 975 mg  ONCE         Last MAR action: Given JÚNIOR LEON               ED Course     Vitals:    08/13/24 1928 08/13/24 1930 08/13/24 1945 08/13/24 1959   BP:  (!) 139/92 113/88 (!) 134/100   BP Location:       Patient Position:       Pulse:  (!) 120 (!) 124 (!) 115   Resp:  17 20 19   Temp:  (!) 100.6 °F (38.1 °C)     TempSrc:       SpO2: 100% 97% 97% 98%   Weight:                Consults                7:28 PM  I have discussed the case with the Hospitalist provider. We discussed the pertinent history, physical, diagnostic studies and the ED management of the patient.  The plan is admit patient under Dr. Myrick. HUNTER Worrell was been apprised of this patient.     7:23 PM I have PS messaged Dr. Michael Romberg of General Surgery regarding patient. Dr. Romberg would like to defer to podiatry.     8:26 PM I have discussed the case with the podiatrist provider, Dr. Jaren Herbert. We discussed the pertinent history, physical, diagnostic studies and the ED management of the patient.  The  plan is admit patient, ordering arterial doppler indices.     Medical Decision Making  49-year-old male with past medical history of CKD status post renal transplant, DM2, HTN, recent 5 toe amputation of left foot in March 2024 who presents with nausea, vomiting, fever, chills and left foot ulcer with new drainage. Rest of history as above.    History obtained by: Patient and mother  Relevant encounters reviewed: I have reviewed Dyllan Boogie's previous office visit note from 8/5/2024 .  Note Review Summary: Patient seen in wound clinic with Dr. Romberg, [facility] includes history of kidney transplant, diabetic ulcer of left foot associated with DM2, HTN.  He was evaluated for left foot ulceration at this time and treated with antibiotics and to follow-up with Dr. Romberg in 2 weeks.    On initial evaluation, patient is resting in bed in no acute distress no respiratory distress. Vitals are temp 100.6 F, , /90, RR 20, SpO2 100%. Exam is as above.    Differential diagnosis includes but is not limited to diabetic foot ulcer, osteomyelitis, sepsis. Multiple differential were considered.     EKG shows: EKG Interpretation  Rate: 124 bpm  Rhythm: sinus tachycardia   No stemi or st depression, no evidence of ischemia    Labs show: Quad respiratory panel negative, CMP shows sodium 128, glucose 280, creatinine 2.63, GFR 29, lipase and troponin both within normal limits, WBC 16.6. LA 1.3.     Imaging shows: Chest x-ray shows no acute cardiopulmonary process.  XR left foot: \"decrease osseous mineralization. Status post amputation at the level of the metatarsal throughout the digits. There is soft tissue swelling throughout the foot,  predominant involving the midfoot. Subtle areas of possible ulceration involving the soft tissues overlying the first MTP. The alignment is unremarkable. No definite fractures. Nonspecific mild periosteal reaction overlying the stump of the first metatarsal bone and fifth metatarsal bone  without underlying erosive changes or cortical irregularity.\"    Plan: IVF, vanco, blood cultures sent.  Zofran.  Plan to admit patient for full admission for IV antibiotics/sepsis.  Discussed patient case with physician assistant Juanita Worrell and admitted under Dr. Myrick. Dr. Herbert of podiatry consulted and recs arterial doppler indices.     Patient history, physical exam, workup, treatment plan and final disposition discussed and staffed with ED attending, Dr. Case.                                  Sepsis Documentation      1) Severe Sepsis Identified?No, severe sepsis criteria not met   Now   8:16 PM     2) Is Lactate >/= 4 or Hypotension (SBP<90, MAP<65 x2 in 3hrs) Present? No     3) Documentation Caveats:    General Exclusions (Check any that apply): None applicable  Abnormal values:  Doubt bacterial infection as primary cause of: Creatinine > 2.0, suspected due to CKD    IVF based on ideal not actual body weight, IBW -> 84 kg, bolus of 2.5L NS administered.       Critical Care       No Critical Care        Disposition       Clinical Impression and Diagnosis  7:43 PM 08/13/24     Diagnosis:   1. Diabetic ulcer of left foot associated with type 2 diabetes mellitus, unspecified part of foot, unspecified ulcer stage  (CMD)    2. Nausea and vomiting, unspecified vomiting type                   Admit 8/13/2024  7:35 PM  Telemetry Bed?: Yes  Admitting Physician: ANNIKA MYRICK [[id number]]  Is this a telephone or verbal order?: This is a telephone order from the admitting physician                   Anjelica Serrano PA-C  08/13/24 1953       Anjelica Serrano PA-C  08/13/24 2041

## 2024-10-19 ENCOUNTER — OFFICE VISIT (OUTPATIENT)
Dept: URGENT CARE | Facility: URGENT CARE | Age: 54
End: 2024-10-19
Payer: MEDICAID

## 2024-10-19 VITALS
HEART RATE: 105 BPM | SYSTOLIC BLOOD PRESSURE: 138 MMHG | WEIGHT: 154 LBS | BODY MASS INDEX: 27.28 KG/M2 | RESPIRATION RATE: 16 BRPM | DIASTOLIC BLOOD PRESSURE: 88 MMHG | TEMPERATURE: 98.9 F | OXYGEN SATURATION: 98 %

## 2024-10-19 DIAGNOSIS — B96.89 ACUTE BACTERIAL RHINOSINUSITIS: Primary | ICD-10-CM

## 2024-10-19 DIAGNOSIS — J01.90 ACUTE BACTERIAL RHINOSINUSITIS: Primary | ICD-10-CM

## 2024-10-19 PROCEDURE — 99213 OFFICE O/P EST LOW 20 MIN: CPT | Performed by: FAMILY MEDICINE

## 2024-10-19 NOTE — PATIENT INSTRUCTIONS
/It was a pleasure to see you today.  Below are a summary of my recommendations as discussed during your visit:  Drink plenty of fluid as this may thin your nasal secretions.  Take your antibiotic with food, you may consider taking probiotics such as culturelle, florajen, florastor etc to reduce chances of getting secondary diarrhea from the antibiotic. You can get this over the counter  I recommend you use sinus irrigation particularly before you go to bed and in the morning when you wake up, but you may also do it throughout the day. This may improve your cough as it helps remove some mucus plugging that can cause postnasal drip. You may buy a Neti Pot or sinus rinsing bottle such as NeilMed to do this.         Don't hesitate to contact us if you have any questions    Dr Savage (Evaristo Haley MD)

## 2024-10-19 NOTE — PROGRESS NOTES
Assessment & Plan     Acute bacterial rhinosinusitis    Prescribed antibiotics as reflected in the plan. Recommended sinus rinsing  at least before bed and in the morning.  Recommended symptomatic treatment and adequate hydration to help thin nasal secretions.  An after visit summary with the plan of care summarized given to the patient.    Patient to call us if persisting or worsening symptoms.   - amoxicillin-clavulanate (AUGMENTIN) 875-125 MG tablet  Dispense: 20 tablet; Refill: 0         No follow-ups on file.    Evaristo Haley MD  [facility]    Teto Mack is a 54 year old female who presents to clinic today for the following health issues:  Chief Complaint   Patient presents with    Sinus Problem     Pain, pressure, drainage, ear pain       HPI    The patient is here for evaluation of cough, nasal congestion since last weekend.  She indicates it started as a dry cough but then she developed nasal congestion some ear pain as well as a productive cough particularly at nighttime.  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  She indicates she has a history of prior rhinosinusitis and bronchitis several times.      Review of Systems  No fever, she does feel tired, she has not observed this sputum or nasal discharge.  She denies shortness of breath.  She denies fevers or chills      Objective    /88   Pulse 105   Temp 98.9  F (37.2  C) (Tympanic)   Resp 16   Wt 69.9 kg (154 lb)   LMP  (LMP Unknown)   SpO2 98%   BMI 27.28 kg/m    Physical Exam   On physical exam, the patient appears in no acute distress vital signs were reviewed  ENT examination pupils equally reactive to light, oropharynx is clear and moist.  Nasal passages with edema bilaterally. Maxillary tenderness to palpation  Neck supple no thyromegaly and no significant adenopathy.  Lungs clear to auscultation bilaterally without adventitious sounds    No results  found for this or any previous visit (from the past 24 hour(s)).

## 2025-04-14 ENCOUNTER — TELEPHONE (OUTPATIENT)
Dept: FAMILY MEDICINE | Facility: CLINIC | Age: 55
End: 2025-04-14
Payer: MEDICAID

## 2025-04-14 NOTE — TELEPHONE ENCOUNTER
General Call      Reason for Call:  referral     What are your questions or concerns:  Pt received a letter stating that she needs or was referred to see endocrinigolist and she is unsure why she needs to see why. Pt would like someone from [facility] to call her back and explain     Date of last appointment with provider:     Okay to leave a detailed message?: Yes at Cell number on file:    Telephone Information:   Mobile [phone]

## 2025-04-15 NOTE — TELEPHONE ENCOUNTER
Called and spoke with patient gave directive per [facility], patient verbalized a good understanding. She requested that US thyroid be faxed to [facility] and will wait for referral team to contact her about Endocrinologist otherwise she will call back if she finds a specific one to have the referral sent to instead.

## 2025-04-17 PROBLEM — M81.0 AGE-RELATED OSTEOPOROSIS WITHOUT CURRENT PATHOLOGICAL FRACTURE: Status: ACTIVE | Noted: 2022-03-01
